# Patient Record
Sex: FEMALE | Race: WHITE | NOT HISPANIC OR LATINO | Employment: OTHER | URBAN - METROPOLITAN AREA
[De-identification: names, ages, dates, MRNs, and addresses within clinical notes are randomized per-mention and may not be internally consistent; named-entity substitution may affect disease eponyms.]

---

## 2019-05-28 ENCOUNTER — HOSPITAL ENCOUNTER (OUTPATIENT)
Dept: ULTRASOUND IMAGING | Facility: HOSPITAL | Age: 79
Discharge: HOME | End: 2019-05-28
Attending: NURSE PRACTITIONER

## 2019-12-15 ENCOUNTER — HOSPITAL ENCOUNTER (OUTPATIENT)
Dept: URGENT CARE | Facility: CLINIC | Age: 79
Discharge: HOME | End: 2019-12-15

## 2019-12-19 ENCOUNTER — HOSPITAL ENCOUNTER (OUTPATIENT)
Dept: GENERAL RADIOLOGY | Facility: HOSPITAL | Age: 79
Discharge: HOME | End: 2019-12-19
Attending: NURSE PRACTITIONER

## 2020-01-02 ENCOUNTER — HOSPITAL ENCOUNTER (OUTPATIENT)
Dept: URGENT CARE | Facility: CLINIC | Age: 80
Discharge: HOME | End: 2020-01-02
Attending: FAMILY MEDICINE

## 2020-02-05 ENCOUNTER — OFFICE VISIT CONVERTED (OUTPATIENT)
Dept: SURGERY | Facility: CLINIC | Age: 80
End: 2020-02-05
Attending: SURGERY

## 2020-02-14 ENCOUNTER — OFFICE VISIT CONVERTED (OUTPATIENT)
Dept: INTERNAL MEDICINE | Facility: CLINIC | Age: 80
End: 2020-02-14
Attending: NURSE PRACTITIONER

## 2020-02-14 ENCOUNTER — HOSPITAL ENCOUNTER (OUTPATIENT)
Dept: OTHER | Facility: HOSPITAL | Age: 80
Discharge: HOME | End: 2020-02-14
Attending: NURSE PRACTITIONER

## 2020-02-14 ENCOUNTER — CONVERSION ENCOUNTER (OUTPATIENT)
Dept: INTERNAL MEDICINE | Facility: CLINIC | Age: 80
End: 2020-02-14

## 2020-02-14 LAB
ALBUMIN SERPL-MCNC: 4 G/DL (ref 3.5–5)
ALBUMIN/GLOB SERPL: 1.3 {RATIO} (ref 1.4–2.6)
ALP SERPL-CCNC: 53 U/L (ref 43–160)
ALT SERPL-CCNC: 10 U/L (ref 10–40)
ANION GAP SERPL CALC-SCNC: 17 MMOL/L (ref 8–19)
AST SERPL-CCNC: 20 U/L (ref 15–50)
BILIRUB SERPL-MCNC: 0.27 MG/DL (ref 0.2–1.3)
BUN SERPL-MCNC: 24 MG/DL (ref 5–25)
BUN/CREAT SERPL: 22 {RATIO} (ref 6–20)
CALCIUM SERPL-MCNC: 9.5 MG/DL (ref 8.7–10.4)
CHLORIDE SERPL-SCNC: 101 MMOL/L (ref 99–111)
CHOLEST SERPL-MCNC: 218 MG/DL (ref 107–200)
CHOLEST/HDLC SERPL: 3.6 {RATIO} (ref 3–6)
CONV CO2: 25 MMOL/L (ref 22–32)
CONV CREATININE URINE, RANDOM: 121.8 MG/DL (ref 10–300)
CONV MICROALBUM.,U,RANDOM: <12 MG/L (ref 0–20)
CONV TOTAL PROTEIN: 7 G/DL (ref 6.3–8.2)
CREAT UR-MCNC: 1.11 MG/DL (ref 0.5–0.9)
GFR SERPLBLD BASED ON 1.73 SQ M-ARVRAT: 48 ML/MIN/{1.73_M2}
GLOBULIN UR ELPH-MCNC: 3 G/DL (ref 2–3.5)
GLUCOSE SERPL-MCNC: 93 MG/DL (ref 65–99)
HDLC SERPL-MCNC: 61 MG/DL (ref 40–60)
LDLC SERPL CALC-MCNC: 101 MG/DL (ref 70–100)
MICROALBUMIN/CREAT UR: 9.9 MG/G{CRE} (ref 0–35)
OSMOLALITY SERPL CALC.SUM OF ELEC: 290 MOSM/KG (ref 273–304)
POTASSIUM SERPL-SCNC: 4.8 MMOL/L (ref 3.5–5.3)
SODIUM SERPL-SCNC: 138 MMOL/L (ref 135–147)
T4 FREE SERPL-MCNC: 1.1 NG/DL (ref 0.9–1.8)
TRIGL SERPL-MCNC: 279 MG/DL (ref 40–150)
TSH SERPL-ACNC: 5.14 M[IU]/L (ref 0.27–4.2)
VLDLC SERPL-MCNC: 56 MG/DL (ref 5–37)

## 2020-05-05 ENCOUNTER — HOSPITAL ENCOUNTER (OUTPATIENT)
Dept: OTHER | Facility: HOSPITAL | Age: 80
Discharge: HOME | End: 2020-05-05
Attending: NURSE PRACTITIONER

## 2020-05-05 LAB
ALBUMIN SERPL-MCNC: 4 G/DL (ref 3.5–5)
ALBUMIN/GLOB SERPL: 1.5 {RATIO} (ref 1.4–2.6)
ALP SERPL-CCNC: 51 U/L (ref 43–160)
ALT SERPL-CCNC: 12 U/L (ref 10–40)
ANION GAP SERPL CALC-SCNC: 20 MMOL/L (ref 8–19)
AST SERPL-CCNC: 22 U/L (ref 15–50)
BASOPHILS # BLD AUTO: 0.04 10*3/UL (ref 0–0.2)
BASOPHILS NFR BLD AUTO: 1 % (ref 0–3)
BILIRUB SERPL-MCNC: 0.33 MG/DL (ref 0.2–1.3)
BUN SERPL-MCNC: 30 MG/DL (ref 5–25)
BUN/CREAT SERPL: 24 {RATIO} (ref 6–20)
CALCIUM SERPL-MCNC: 8.9 MG/DL (ref 8.7–10.4)
CHLORIDE SERPL-SCNC: 101 MMOL/L (ref 99–111)
CHOLEST SERPL-MCNC: 134 MG/DL (ref 107–200)
CHOLEST/HDLC SERPL: 2 {RATIO} (ref 3–6)
CONV ABS IMM GRAN: 0.01 10*3/UL (ref 0–0.2)
CONV CO2: 22 MMOL/L (ref 22–32)
CONV IMMATURE GRAN: 0.3 % (ref 0–1.8)
CONV TOTAL PROTEIN: 6.7 G/DL (ref 6.3–8.2)
CREAT UR-MCNC: 1.24 MG/DL (ref 0.5–0.9)
DEPRECATED RDW RBC AUTO: 47.8 FL (ref 36.4–46.3)
EOSINOPHIL # BLD AUTO: 0.24 10*3/UL (ref 0–0.7)
EOSINOPHIL # BLD AUTO: 6.2 % (ref 0–7)
ERYTHROCYTE [DISTWIDTH] IN BLOOD BY AUTOMATED COUNT: 13.5 % (ref 11.7–14.4)
EST. AVERAGE GLUCOSE BLD GHB EST-MCNC: 157 MG/DL
GFR SERPLBLD BASED ON 1.73 SQ M-ARVRAT: 42 ML/MIN/{1.73_M2}
GLOBULIN UR ELPH-MCNC: 2.7 G/DL (ref 2–3.5)
GLUCOSE SERPL-MCNC: 117 MG/DL (ref 65–99)
HBA1C MFR BLD: 7.1 % (ref 3.5–5.7)
HCT VFR BLD AUTO: 36.2 % (ref 37–47)
HDLC SERPL-MCNC: 68 MG/DL (ref 40–60)
HGB BLD-MCNC: 11.4 G/DL (ref 12–16)
LDLC SERPL CALC-MCNC: 48 MG/DL (ref 70–100)
LYMPHOCYTES # BLD AUTO: 1.23 10*3/UL (ref 1–5)
LYMPHOCYTES NFR BLD AUTO: 31.9 % (ref 20–45)
MCH RBC QN AUTO: 30.1 PG (ref 27–31)
MCHC RBC AUTO-ENTMCNC: 31.5 G/DL (ref 33–37)
MCV RBC AUTO: 95.5 FL (ref 81–99)
MONOCYTES # BLD AUTO: 0.41 10*3/UL (ref 0.2–1.2)
MONOCYTES NFR BLD AUTO: 10.6 % (ref 3–10)
NEUTROPHILS # BLD AUTO: 1.92 10*3/UL (ref 2–8)
NEUTROPHILS NFR BLD AUTO: 50 % (ref 30–85)
NRBC CBCN: 0 % (ref 0–0.7)
OSMOLALITY SERPL CALC.SUM OF ELEC: 295 MOSM/KG (ref 273–304)
PLATELET # BLD AUTO: 195 10*3/UL (ref 130–400)
PMV BLD AUTO: 11.5 FL (ref 9.4–12.3)
POTASSIUM SERPL-SCNC: 4.4 MMOL/L (ref 3.5–5.3)
RBC # BLD AUTO: 3.79 10*6/UL (ref 4.2–5.4)
SODIUM SERPL-SCNC: 139 MMOL/L (ref 135–147)
T4 FREE SERPL-MCNC: 1.1 NG/DL (ref 0.9–1.8)
TRIGL SERPL-MCNC: 92 MG/DL (ref 40–150)
TSH SERPL-ACNC: 6.23 M[IU]/L (ref 0.27–4.2)
VLDLC SERPL-MCNC: 18 MG/DL (ref 5–37)
WBC # BLD AUTO: 3.85 10*3/UL (ref 4.8–10.8)

## 2020-05-06 LAB
FERRITIN SERPL-MCNC: 49 NG/ML (ref 10–200)
IRON SATN MFR SERPL: 20 % (ref 20–55)
IRON SERPL-MCNC: 75 UG/DL (ref 60–170)
TIBC SERPL-MCNC: 373 UG/DL (ref 245–450)
TRANSFERRIN SERPL-MCNC: 261 MG/DL (ref 250–380)

## 2020-05-07 LAB
FOLATE SERPL-MCNC: >20 NG/ML (ref 4.8–20)
VIT B12 SERPL-MCNC: 1551 PG/ML (ref 211–911)

## 2020-05-15 ENCOUNTER — TELEPHONE CONVERTED (OUTPATIENT)
Dept: INTERNAL MEDICINE | Facility: CLINIC | Age: 80
End: 2020-05-15
Attending: NURSE PRACTITIONER

## 2020-06-12 ENCOUNTER — HOSPITAL ENCOUNTER (OUTPATIENT)
Dept: OTHER | Facility: HOSPITAL | Age: 80
Discharge: HOME | End: 2020-06-12
Attending: NURSE PRACTITIONER

## 2020-06-12 LAB
ALBUMIN SERPL-MCNC: 4 G/DL (ref 3.5–5)
ALBUMIN/GLOB SERPL: 1.5 {RATIO} (ref 1.4–2.6)
ALP SERPL-CCNC: 53 U/L (ref 43–160)
ALT SERPL-CCNC: 13 U/L (ref 10–40)
ANION GAP SERPL CALC-SCNC: 18 MMOL/L (ref 8–19)
AST SERPL-CCNC: 20 U/L (ref 15–50)
BASOPHILS # BLD AUTO: 0.04 10*3/UL (ref 0–0.2)
BASOPHILS NFR BLD AUTO: 0.7 % (ref 0–3)
BILIRUB SERPL-MCNC: 0.46 MG/DL (ref 0.2–1.3)
BUN SERPL-MCNC: 27 MG/DL (ref 5–25)
BUN/CREAT SERPL: 22 {RATIO} (ref 6–20)
CALCIUM SERPL-MCNC: 9.4 MG/DL (ref 8.7–10.4)
CHLORIDE SERPL-SCNC: 101 MMOL/L (ref 99–111)
CONV ABS IMM GRAN: 0.02 10*3/UL (ref 0–0.2)
CONV CO2: 25 MMOL/L (ref 22–32)
CONV IMMATURE GRAN: 0.3 % (ref 0–1.8)
CONV TOTAL PROTEIN: 6.7 G/DL (ref 6.3–8.2)
CREAT UR-MCNC: 1.21 MG/DL (ref 0.5–0.9)
DEPRECATED RDW RBC AUTO: 48.2 FL (ref 36.4–46.3)
EOSINOPHIL # BLD AUTO: 0.24 10*3/UL (ref 0–0.7)
EOSINOPHIL # BLD AUTO: 4.1 % (ref 0–7)
ERYTHROCYTE [DISTWIDTH] IN BLOOD BY AUTOMATED COUNT: 13.6 % (ref 11.7–14.4)
GFR SERPLBLD BASED ON 1.73 SQ M-ARVRAT: 43 ML/MIN/{1.73_M2}
GLOBULIN UR ELPH-MCNC: 2.7 G/DL (ref 2–3.5)
GLUCOSE SERPL-MCNC: 111 MG/DL (ref 65–99)
HCT VFR BLD AUTO: 39.2 % (ref 37–47)
HGB BLD-MCNC: 11.8 G/DL (ref 12–16)
LYMPHOCYTES # BLD AUTO: 1.18 10*3/UL (ref 1–5)
LYMPHOCYTES NFR BLD AUTO: 20.2 % (ref 20–45)
MCH RBC QN AUTO: 28.9 PG (ref 27–31)
MCHC RBC AUTO-ENTMCNC: 30.1 G/DL (ref 33–37)
MCV RBC AUTO: 96.1 FL (ref 81–99)
MONOCYTES # BLD AUTO: 0.58 10*3/UL (ref 0.2–1.2)
MONOCYTES NFR BLD AUTO: 9.9 % (ref 3–10)
NEUTROPHILS # BLD AUTO: 3.79 10*3/UL (ref 2–8)
NEUTROPHILS NFR BLD AUTO: 64.8 % (ref 30–85)
NRBC CBCN: 0 % (ref 0–0.7)
OSMOLALITY SERPL CALC.SUM OF ELEC: 296 MOSM/KG (ref 273–304)
PLATELET # BLD AUTO: 242 10*3/UL (ref 130–400)
PMV BLD AUTO: 10.6 FL (ref 9.4–12.3)
POTASSIUM SERPL-SCNC: 4.3 MMOL/L (ref 3.5–5.3)
RBC # BLD AUTO: 4.08 10*6/UL (ref 4.2–5.4)
SODIUM SERPL-SCNC: 140 MMOL/L (ref 135–147)
T4 FREE SERPL-MCNC: 1.2 NG/DL (ref 0.9–1.8)
TSH SERPL-ACNC: 2.79 M[IU]/L (ref 0.27–4.2)
WBC # BLD AUTO: 5.85 10*3/UL (ref 4.8–10.8)

## 2020-06-15 ENCOUNTER — OFFICE VISIT CONVERTED (OUTPATIENT)
Dept: INTERNAL MEDICINE | Facility: CLINIC | Age: 80
End: 2020-06-15
Attending: NURSE PRACTITIONER

## 2020-06-15 ENCOUNTER — HOSPITAL ENCOUNTER (OUTPATIENT)
Dept: URGENT CARE | Facility: CLINIC | Age: 80
Discharge: HOME | End: 2020-06-15
Attending: NURSE PRACTITIONER

## 2020-06-17 LAB — SARS-COV-2 RNA SPEC QL NAA+PROBE: NOT DETECTED

## 2020-08-12 ENCOUNTER — HOSPITAL ENCOUNTER (OUTPATIENT)
Dept: OTHER | Facility: HOSPITAL | Age: 80
Discharge: HOME | End: 2020-08-12
Attending: NURSE PRACTITIONER

## 2020-08-12 LAB
BASOPHILS # BLD AUTO: 0.04 10*3/UL (ref 0–0.2)
BASOPHILS NFR BLD AUTO: 1.1 % (ref 0–3)
CONV ABS IMM GRAN: 0.01 10*3/UL (ref 0–0.2)
CONV IMMATURE GRAN: 0.3 % (ref 0–1.8)
DEPRECATED RDW RBC AUTO: 49.6 FL (ref 36.4–46.3)
EOSINOPHIL # BLD AUTO: 0.32 10*3/UL (ref 0–0.7)
EOSINOPHIL # BLD AUTO: 9 % (ref 0–7)
ERYTHROCYTE [DISTWIDTH] IN BLOOD BY AUTOMATED COUNT: 14 % (ref 11.7–14.4)
EST. AVERAGE GLUCOSE BLD GHB EST-MCNC: 177 MG/DL
HBA1C MFR BLD: 7.8 % (ref 3.5–5.7)
HCT VFR BLD AUTO: 38.9 % (ref 37–47)
HGB BLD-MCNC: 12 G/DL (ref 12–16)
LYMPHOCYTES # BLD AUTO: 1.05 10*3/UL (ref 1–5)
LYMPHOCYTES NFR BLD AUTO: 29.7 % (ref 20–45)
MCH RBC QN AUTO: 29.7 PG (ref 27–31)
MCHC RBC AUTO-ENTMCNC: 30.8 G/DL (ref 33–37)
MCV RBC AUTO: 96.3 FL (ref 81–99)
MONOCYTES # BLD AUTO: 0.34 10*3/UL (ref 0.2–1.2)
MONOCYTES NFR BLD AUTO: 9.6 % (ref 3–10)
NEUTROPHILS # BLD AUTO: 1.78 10*3/UL (ref 2–8)
NEUTROPHILS NFR BLD AUTO: 50.3 % (ref 30–85)
NRBC CBCN: 0 % (ref 0–0.7)
PLATELET # BLD AUTO: 249 10*3/UL (ref 130–400)
PMV BLD AUTO: 11.2 FL (ref 9.4–12.3)
RBC # BLD AUTO: 4.04 10*6/UL (ref 4.2–5.4)
WBC # BLD AUTO: 3.54 10*3/UL (ref 4.8–10.8)

## 2020-08-13 LAB
ALBUMIN SERPL-MCNC: 3.9 G/DL (ref 3.5–5)
ALBUMIN/GLOB SERPL: 1.4 {RATIO} (ref 1.4–2.6)
ALP SERPL-CCNC: 59 U/L (ref 43–160)
ALT SERPL-CCNC: 16 U/L (ref 10–40)
ANION GAP SERPL CALC-SCNC: 17 MMOL/L (ref 8–19)
AST SERPL-CCNC: 24 U/L (ref 15–50)
BILIRUB SERPL-MCNC: 0.28 MG/DL (ref 0.2–1.3)
BUN SERPL-MCNC: 29 MG/DL (ref 5–25)
BUN/CREAT SERPL: 21 {RATIO} (ref 6–20)
CALCIUM SERPL-MCNC: 10.1 MG/DL (ref 8.7–10.4)
CHLORIDE SERPL-SCNC: 101 MMOL/L (ref 99–111)
CONV CO2: 25 MMOL/L (ref 22–32)
CONV TOTAL PROTEIN: 6.6 G/DL (ref 6.3–8.2)
CREAT UR-MCNC: 1.41 MG/DL (ref 0.5–0.9)
GFR SERPLBLD BASED ON 1.73 SQ M-ARVRAT: 36 ML/MIN/{1.73_M2}
GLOBULIN UR ELPH-MCNC: 2.7 G/DL (ref 2–3.5)
GLUCOSE SERPL-MCNC: 193 MG/DL (ref 65–99)
OSMOLALITY SERPL CALC.SUM OF ELEC: 297 MOSM/KG (ref 273–304)
POTASSIUM SERPL-SCNC: 5.3 MMOL/L (ref 3.5–5.3)
SODIUM SERPL-SCNC: 138 MMOL/L (ref 135–147)

## 2020-08-18 ENCOUNTER — OFFICE VISIT CONVERTED (OUTPATIENT)
Dept: INTERNAL MEDICINE | Facility: CLINIC | Age: 80
End: 2020-08-18
Attending: NURSE PRACTITIONER

## 2020-09-14 ENCOUNTER — HOSPITAL ENCOUNTER (OUTPATIENT)
Dept: OTHER | Facility: HOSPITAL | Age: 80
Discharge: HOME | End: 2020-09-14
Attending: PHYSICIAN ASSISTANT

## 2020-09-14 LAB
ALBUMIN SERPL-MCNC: 3.9 G/DL (ref 3.5–5)
ALBUMIN/GLOB SERPL: 1.4 {RATIO} (ref 1.4–2.6)
ALP SERPL-CCNC: 64 U/L (ref 43–160)
ALT SERPL-CCNC: 16 U/L (ref 10–40)
ANION GAP SERPL CALC-SCNC: 18 MMOL/L (ref 8–19)
AST SERPL-CCNC: 22 U/L (ref 15–50)
BASOPHILS # BLD AUTO: 0.04 10*3/UL (ref 0–0.2)
BASOPHILS NFR BLD AUTO: 0.9 % (ref 0–3)
BILIRUB SERPL-MCNC: 0.33 MG/DL (ref 0.2–1.3)
BUN SERPL-MCNC: 26 MG/DL (ref 5–25)
BUN/CREAT SERPL: 20 {RATIO} (ref 6–20)
CALCIUM SERPL-MCNC: 9.4 MG/DL (ref 8.7–10.4)
CHLORIDE SERPL-SCNC: 102 MMOL/L (ref 99–111)
CONV ABS IMM GRAN: 0.02 10*3/UL (ref 0–0.2)
CONV CO2: 22 MMOL/L (ref 22–32)
CONV IMMATURE GRAN: 0.4 % (ref 0–1.8)
CONV TOTAL PROTEIN: 6.6 G/DL (ref 6.3–8.2)
CREAT UR-MCNC: 1.32 MG/DL (ref 0.5–0.9)
DEPRECATED RDW RBC AUTO: 46.1 FL (ref 36.4–46.3)
EOSINOPHIL # BLD AUTO: 0.25 10*3/UL (ref 0–0.7)
EOSINOPHIL # BLD AUTO: 5.6 % (ref 0–7)
ERYTHROCYTE [DISTWIDTH] IN BLOOD BY AUTOMATED COUNT: 13.1 % (ref 11.7–14.4)
GFR SERPLBLD BASED ON 1.73 SQ M-ARVRAT: 39 ML/MIN/{1.73_M2}
GLOBULIN UR ELPH-MCNC: 2.7 G/DL (ref 2–3.5)
GLUCOSE SERPL-MCNC: 198 MG/DL (ref 65–99)
HCT VFR BLD AUTO: 41 % (ref 37–47)
HGB BLD-MCNC: 12.6 G/DL (ref 12–16)
LYMPHOCYTES # BLD AUTO: 1.26 10*3/UL (ref 1–5)
LYMPHOCYTES NFR BLD AUTO: 28.1 % (ref 20–45)
MCH RBC QN AUTO: 29.9 PG (ref 27–31)
MCHC RBC AUTO-ENTMCNC: 30.7 G/DL (ref 33–37)
MCV RBC AUTO: 97.2 FL (ref 81–99)
MONOCYTES # BLD AUTO: 0.47 10*3/UL (ref 0.2–1.2)
MONOCYTES NFR BLD AUTO: 10.5 % (ref 3–10)
NEUTROPHILS # BLD AUTO: 2.45 10*3/UL (ref 2–8)
NEUTROPHILS NFR BLD AUTO: 54.5 % (ref 30–85)
NRBC CBCN: 0 % (ref 0–0.7)
OSMOLALITY SERPL CALC.SUM OF ELEC: 294 MOSM/KG (ref 273–304)
PLATELET # BLD AUTO: 289 10*3/UL (ref 130–400)
PMV BLD AUTO: 10.9 FL (ref 9.4–12.3)
POTASSIUM SERPL-SCNC: 4.9 MMOL/L (ref 3.5–5.3)
RBC # BLD AUTO: 4.22 10*6/UL (ref 4.2–5.4)
SODIUM SERPL-SCNC: 137 MMOL/L (ref 135–147)
T4 FREE SERPL-MCNC: 1 NG/DL (ref 0.9–1.8)
TSH SERPL-ACNC: 1.83 M[IU]/L (ref 0.27–4.2)
WBC # BLD AUTO: 4.49 10*3/UL (ref 4.8–10.8)

## 2020-11-13 ENCOUNTER — HOSPITAL ENCOUNTER (OUTPATIENT)
Dept: OTHER | Facility: HOSPITAL | Age: 80
Discharge: HOME | End: 2020-11-13
Attending: NURSE PRACTITIONER

## 2020-11-13 LAB
BASOPHILS # BLD AUTO: 0.05 10*3/UL (ref 0–0.2)
BASOPHILS NFR BLD AUTO: 1.2 % (ref 0–3)
CONV ABS IMM GRAN: 0 10*3/UL (ref 0–0.2)
CONV IMMATURE GRAN: 0 % (ref 0–1.8)
DEPRECATED RDW RBC AUTO: 40.4 FL (ref 36.4–46.3)
EOSINOPHIL # BLD AUTO: 0.28 10*3/UL (ref 0–0.7)
EOSINOPHIL # BLD AUTO: 6.8 % (ref 0–7)
ERYTHROCYTE [DISTWIDTH] IN BLOOD BY AUTOMATED COUNT: 12.1 % (ref 11.7–14.4)
EST. AVERAGE GLUCOSE BLD GHB EST-MCNC: 220 MG/DL
HBA1C MFR BLD: 9.3 % (ref 3.5–5.7)
HCT VFR BLD AUTO: 43.8 % (ref 37–47)
HGB BLD-MCNC: 14.1 G/DL (ref 12–16)
LYMPHOCYTES # BLD AUTO: 1.26 10*3/UL (ref 1–5)
LYMPHOCYTES NFR BLD AUTO: 30.4 % (ref 20–45)
MCH RBC QN AUTO: 29.6 PG (ref 27–31)
MCHC RBC AUTO-ENTMCNC: 32.2 G/DL (ref 33–37)
MCV RBC AUTO: 91.8 FL (ref 81–99)
MONOCYTES # BLD AUTO: 0.49 10*3/UL (ref 0.2–1.2)
MONOCYTES NFR BLD AUTO: 11.8 % (ref 3–10)
NEUTROPHILS # BLD AUTO: 2.06 10*3/UL (ref 2–8)
NEUTROPHILS NFR BLD AUTO: 49.8 % (ref 30–85)
NRBC CBCN: 0 % (ref 0–0.7)
PLATELET # BLD AUTO: 282 10*3/UL (ref 130–400)
PMV BLD AUTO: 10.5 FL (ref 9.4–12.3)
RBC # BLD AUTO: 4.77 10*6/UL (ref 4.2–5.4)
WBC # BLD AUTO: 4.14 10*3/UL (ref 4.8–10.8)

## 2020-11-14 LAB
ALBUMIN SERPL-MCNC: 4.2 G/DL (ref 3.5–5)
ALBUMIN/GLOB SERPL: 1.5 {RATIO} (ref 1.4–2.6)
ALP SERPL-CCNC: 73 U/L (ref 43–160)
ALT SERPL-CCNC: 14 U/L (ref 10–40)
ANION GAP SERPL CALC-SCNC: 17 MMOL/L (ref 8–19)
AST SERPL-CCNC: 22 U/L (ref 15–50)
BILIRUB SERPL-MCNC: 0.38 MG/DL (ref 0.2–1.3)
BUN SERPL-MCNC: 20 MG/DL (ref 5–25)
BUN/CREAT SERPL: 15 {RATIO} (ref 6–20)
CALCIUM SERPL-MCNC: 9.3 MG/DL (ref 8.7–10.4)
CHLORIDE SERPL-SCNC: 102 MMOL/L (ref 99–111)
CHOLEST SERPL-MCNC: 162 MG/DL (ref 107–200)
CHOLEST/HDLC SERPL: 2.4 {RATIO} (ref 3–6)
CONV CO2: 24 MMOL/L (ref 22–32)
CONV TOTAL PROTEIN: 7 G/DL (ref 6.3–8.2)
CREAT UR-MCNC: 1.36 MG/DL (ref 0.5–0.9)
GFR SERPLBLD BASED ON 1.73 SQ M-ARVRAT: 37 ML/MIN/{1.73_M2}
GLOBULIN UR ELPH-MCNC: 2.8 G/DL (ref 2–3.5)
GLUCOSE SERPL-MCNC: 181 MG/DL (ref 65–99)
HDLC SERPL-MCNC: 68 MG/DL (ref 40–60)
LDLC SERPL CALC-MCNC: 59 MG/DL (ref 70–100)
OSMOLALITY SERPL CALC.SUM OF ELEC: 293 MOSM/KG (ref 273–304)
POTASSIUM SERPL-SCNC: 5 MMOL/L (ref 3.5–5.3)
SODIUM SERPL-SCNC: 138 MMOL/L (ref 135–147)
T4 FREE SERPL-MCNC: 1.3 NG/DL (ref 0.9–1.8)
TRIGL SERPL-MCNC: 174 MG/DL (ref 40–150)
TSH SERPL-ACNC: 2.86 M[IU]/L (ref 0.27–4.2)
VLDLC SERPL-MCNC: 35 MG/DL (ref 5–37)

## 2020-11-18 ENCOUNTER — HOSPITAL ENCOUNTER (OUTPATIENT)
Dept: OTHER | Facility: HOSPITAL | Age: 80
Discharge: HOME | End: 2020-11-18
Attending: NURSE PRACTITIONER

## 2020-11-18 ENCOUNTER — OFFICE VISIT CONVERTED (OUTPATIENT)
Dept: INTERNAL MEDICINE | Facility: CLINIC | Age: 80
End: 2020-11-18
Attending: NURSE PRACTITIONER

## 2020-11-18 LAB
BASOPHILS # BLD AUTO: 0.05 10*3/UL (ref 0–0.2)
BASOPHILS # BLD: 2 % (ref 0–3)
BASOPHILS NFR BLD AUTO: 1 % (ref 0–3)
CONV ABS BANDS: 0 % (ref 1–5)
CONV ABS IMM GRAN: 0.01 10*3/UL (ref 0–0.2)
CONV ANISOCYTES: SLIGHT
CONV ATYPICAL LYMPHOCYTES: 4 % (ref 0–5)
CONV HYPOCHROMIA IN BLOOD BY LIGHT MICROSCOPY: SLIGHT
CONV IMMATURE GRAN: 0.2 % (ref 0–1.8)
CONV SEGMENTED NEUTROPHILS: 67 % (ref 45–70)
DACRYOCYTES BLD QL SMEAR: ABNORMAL
DEPRECATED RDW RBC AUTO: 42.2 FL (ref 36.4–46.3)
EOSINOPHIL # BLD AUTO: 0.22 10*3/UL (ref 0–0.7)
EOSINOPHIL # BLD AUTO: 4.2 % (ref 0–7)
EOSINOPHIL NFR BLD AUTO: 8 % (ref 0–7)
ERYTHROCYTE [DISTWIDTH] IN BLOOD BY AUTOMATED COUNT: 12.4 % (ref 11.7–14.4)
HCT VFR BLD AUTO: 41.5 % (ref 37–47)
HGB BLD-MCNC: 13 G/DL (ref 12–16)
LYMPHOCYTES # BLD AUTO: 1.32 10*3/UL (ref 1–5)
LYMPHOCYTES NFR BLD AUTO: 25.1 % (ref 20–45)
MCH RBC QN AUTO: 29 PG (ref 27–31)
MCHC RBC AUTO-ENTMCNC: 31.3 G/DL (ref 33–37)
MCV RBC AUTO: 92.4 FL (ref 81–99)
MICROCYTES BLD QL: ABNORMAL
MONOCYTES # BLD AUTO: 0.38 10*3/UL (ref 0.2–1.2)
MONOCYTES NFR BLD AUTO: 7.2 % (ref 3–10)
MONOCYTES NFR BLD MANUAL: 7 % (ref 3–10)
NEUTROPHILS # BLD AUTO: 3.27 10*3/UL (ref 2–8)
NEUTROPHILS NFR BLD AUTO: 62.3 % (ref 30–85)
NRBC CBCN: 0 % (ref 0–0.7)
NUC CELL # PRT MANUAL: 0 /100{WBCS}
PLAT MORPH BLD: NORMAL
PLATELET # BLD AUTO: 270 10*3/UL (ref 130–400)
PMV BLD AUTO: 11.2 FL (ref 9.4–12.3)
POLYCHROMASIA BLD QL SMEAR: ABNORMAL
RBC # BLD AUTO: 4.49 10*6/UL (ref 4.2–5.4)
SMALL PLATELETS BLD QL SMEAR: ADEQUATE
VARIANT LYMPHS NFR BLD MANUAL: 12 % (ref 20–45)
WBC # BLD AUTO: 5.25 10*3/UL (ref 4.8–10.8)

## 2020-12-04 ENCOUNTER — HOSPITAL ENCOUNTER (OUTPATIENT)
Dept: OTHER | Facility: HOSPITAL | Age: 80
Discharge: HOME | End: 2020-12-04
Attending: INTERNAL MEDICINE

## 2020-12-04 LAB
ALBUMIN SERPL-MCNC: 4 G/DL (ref 3.5–5)
ANION GAP SERPL CALC-SCNC: 12 MMOL/L (ref 8–19)
APPEARANCE UR: CLEAR
BASOPHILS # BLD AUTO: 0.04 10*3/UL (ref 0–0.2)
BASOPHILS NFR BLD AUTO: 0.9 % (ref 0–3)
BILIRUB UR QL: NEGATIVE
BUN SERPL-MCNC: 27 MG/DL (ref 5–25)
BUN/CREAT SERPL: 20 {RATIO} (ref 6–20)
CALCIUM SERPL-MCNC: 9.3 MG/DL (ref 8.7–10.4)
CHLORIDE SERPL-SCNC: 102 MMOL/L (ref 99–111)
COLOR UR: YELLOW
CONV ABS IMM GRAN: 0.01 10*3/UL (ref 0–0.2)
CONV CO2: 28 MMOL/L (ref 22–32)
CONV COLLECTION SOURCE (UA): ABNORMAL
CONV CREATININE URINE, RANDOM: 43.8 MG/DL (ref 10–300)
CONV IMMATURE GRAN: 0.2 % (ref 0–1.8)
CONV PROTEIN TO CREATININE RATIO (RANDOM URINE): 0.09 {RATIO} (ref 0–0.1)
CONV UROBILINOGEN IN URINE BY AUTOMATED TEST STRIP: 0.2 {EHRLICHU}/DL (ref 0.1–1)
CREAT UR-MCNC: 1.34 MG/DL (ref 0.5–0.9)
DEPRECATED RDW RBC AUTO: 41.3 FL (ref 36.4–46.3)
EOSINOPHIL # BLD AUTO: 0.27 10*3/UL (ref 0–0.7)
EOSINOPHIL # BLD AUTO: 5.8 % (ref 0–7)
ERYTHROCYTE [DISTWIDTH] IN BLOOD BY AUTOMATED COUNT: 12.2 % (ref 11.7–14.4)
GFR SERPLBLD BASED ON 1.73 SQ M-ARVRAT: 38 ML/MIN/{1.73_M2}
GLUCOSE SERPL-MCNC: 206 MG/DL (ref 65–99)
GLUCOSE UR QL: >=1000 MG/DL
HCT VFR BLD AUTO: 40.8 % (ref 37–47)
HGB BLD-MCNC: 13 G/DL (ref 12–16)
HGB UR QL STRIP: NEGATIVE
KETONES UR QL STRIP: NEGATIVE MG/DL
LEUKOCYTE ESTERASE UR QL STRIP: NEGATIVE
LYMPHOCYTES # BLD AUTO: 1.31 10*3/UL (ref 1–5)
LYMPHOCYTES NFR BLD AUTO: 27.9 % (ref 20–45)
MCH RBC QN AUTO: 29.3 PG (ref 27–31)
MCHC RBC AUTO-ENTMCNC: 31.9 G/DL (ref 33–37)
MCV RBC AUTO: 91.9 FL (ref 81–99)
MONOCYTES # BLD AUTO: 0.44 10*3/UL (ref 0.2–1.2)
MONOCYTES NFR BLD AUTO: 9.4 % (ref 3–10)
NEUTROPHILS # BLD AUTO: 2.62 10*3/UL (ref 2–8)
NEUTROPHILS NFR BLD AUTO: 55.8 % (ref 30–85)
NITRITE UR QL STRIP: NEGATIVE
NRBC CBCN: 0 % (ref 0–0.7)
PH UR STRIP.AUTO: 5 [PH] (ref 5–8)
PHOSPHATE SERPL-MCNC: 3.2 MG/DL (ref 2.4–4.5)
PLATELET # BLD AUTO: 271 10*3/UL (ref 130–400)
PMV BLD AUTO: 10.1 FL (ref 9.4–12.3)
POTASSIUM SERPL-SCNC: 5.2 MMOL/L (ref 3.5–5.3)
PROT UR QL: NEGATIVE MG/DL
PROT UR-MCNC: <4 MG/DL
RBC # BLD AUTO: 4.44 10*6/UL (ref 4.2–5.4)
SODIUM SERPL-SCNC: 137 MMOL/L (ref 135–147)
SP GR UR: 1.02 (ref 1–1.03)
WBC # BLD AUTO: 4.69 10*3/UL (ref 4.8–10.8)

## 2021-02-19 ENCOUNTER — OFFICE VISIT CONVERTED (OUTPATIENT)
Dept: INTERNAL MEDICINE | Facility: CLINIC | Age: 81
End: 2021-02-19
Attending: NURSE PRACTITIONER

## 2021-02-19 ENCOUNTER — HOSPITAL ENCOUNTER (OUTPATIENT)
Dept: OTHER | Facility: HOSPITAL | Age: 81
Discharge: HOME | End: 2021-02-19
Attending: NURSE PRACTITIONER

## 2021-02-19 LAB
ALBUMIN SERPL-MCNC: 4.1 G/DL (ref 3.5–5)
ALBUMIN/GLOB SERPL: 1.5 {RATIO} (ref 1.4–2.6)
ALP SERPL-CCNC: 66 U/L (ref 43–160)
ALT SERPL-CCNC: 13 U/L (ref 10–40)
ANION GAP SERPL CALC-SCNC: 17 MMOL/L (ref 8–19)
AST SERPL-CCNC: 20 U/L (ref 15–50)
BASOPHILS # BLD AUTO: 0.05 10*3/UL (ref 0–0.2)
BASOPHILS NFR BLD AUTO: 0.8 % (ref 0–3)
BILIRUB SERPL-MCNC: 0.32 MG/DL (ref 0.2–1.3)
BUN SERPL-MCNC: 27 MG/DL (ref 5–25)
BUN/CREAT SERPL: 19 {RATIO} (ref 6–20)
CALCIUM SERPL-MCNC: 9.4 MG/DL (ref 8.7–10.4)
CHLORIDE SERPL-SCNC: 105 MMOL/L (ref 99–111)
CHOLEST SERPL-MCNC: 133 MG/DL (ref 107–200)
CHOLEST/HDLC SERPL: 1.8 {RATIO} (ref 3–6)
CONV ABS IMM GRAN: 0.01 10*3/UL (ref 0–0.2)
CONV CO2: 22 MMOL/L (ref 22–32)
CONV CREATININE URINE, RANDOM: 83.8 MG/DL (ref 10–300)
CONV IMMATURE GRAN: 0.2 % (ref 0–1.8)
CONV MICROALBUM.,U,RANDOM: <12 MG/L (ref 0–20)
CONV TOTAL PROTEIN: 6.9 G/DL (ref 6.3–8.2)
CREAT UR-MCNC: 1.45 MG/DL (ref 0.5–0.9)
DEPRECATED RDW RBC AUTO: 45.1 FL (ref 36.4–46.3)
EOSINOPHIL # BLD AUTO: 0.22 10*3/UL (ref 0–0.7)
EOSINOPHIL # BLD AUTO: 3.5 % (ref 0–7)
ERYTHROCYTE [DISTWIDTH] IN BLOOD BY AUTOMATED COUNT: 13.5 % (ref 11.7–14.4)
EST. AVERAGE GLUCOSE BLD GHB EST-MCNC: 189 MG/DL
GFR SERPLBLD BASED ON 1.73 SQ M-ARVRAT: 34 ML/MIN/{1.73_M2}
GLOBULIN UR ELPH-MCNC: 2.8 G/DL (ref 2–3.5)
GLUCOSE SERPL-MCNC: 155 MG/DL (ref 65–99)
HBA1C MFR BLD: 8.2 % (ref 3.5–5.7)
HCT VFR BLD AUTO: 43.1 % (ref 37–47)
HDLC SERPL-MCNC: 73 MG/DL (ref 40–60)
HGB BLD-MCNC: 13.8 G/DL (ref 12–16)
LDLC SERPL CALC-MCNC: 33 MG/DL (ref 70–100)
LYMPHOCYTES # BLD AUTO: 1.28 10*3/UL (ref 1–5)
LYMPHOCYTES NFR BLD AUTO: 20.6 % (ref 20–45)
MCH RBC QN AUTO: 29 PG (ref 27–31)
MCHC RBC AUTO-ENTMCNC: 32 G/DL (ref 33–37)
MCV RBC AUTO: 90.5 FL (ref 81–99)
MICROALBUMIN/CREAT UR: 14.3 MG/G{CRE} (ref 0–35)
MONOCYTES # BLD AUTO: 0.57 10*3/UL (ref 0.2–1.2)
MONOCYTES NFR BLD AUTO: 9.2 % (ref 3–10)
NEUTROPHILS # BLD AUTO: 4.08 10*3/UL (ref 2–8)
NEUTROPHILS NFR BLD AUTO: 65.7 % (ref 30–85)
NRBC CBCN: 0 % (ref 0–0.7)
OSMOLALITY SERPL CALC.SUM OF ELEC: 296 MOSM/KG (ref 273–304)
PLATELET # BLD AUTO: 272 10*3/UL (ref 130–400)
PMV BLD AUTO: 11 FL (ref 9.4–12.3)
POTASSIUM SERPL-SCNC: 5.3 MMOL/L (ref 3.5–5.3)
RBC # BLD AUTO: 4.76 10*6/UL (ref 4.2–5.4)
SODIUM SERPL-SCNC: 139 MMOL/L (ref 135–147)
T4 FREE SERPL-MCNC: 1.3 NG/DL (ref 0.9–1.8)
TRIGL SERPL-MCNC: 133 MG/DL (ref 40–150)
TSH SERPL-ACNC: 1.93 M[IU]/L (ref 0.27–4.2)
VLDLC SERPL-MCNC: 27 MG/DL (ref 5–37)
WBC # BLD AUTO: 6.21 10*3/UL (ref 4.8–10.8)

## 2021-02-24 ENCOUNTER — HOSPITAL ENCOUNTER (OUTPATIENT)
Dept: VACCINE CLINIC | Facility: HOSPITAL | Age: 81
Discharge: HOME | End: 2021-02-24
Attending: INTERNAL MEDICINE

## 2021-03-04 ENCOUNTER — HOSPITAL ENCOUNTER (OUTPATIENT)
Dept: OTHER | Facility: HOSPITAL | Age: 81
Discharge: HOME | End: 2021-03-04
Attending: INTERNAL MEDICINE

## 2021-03-04 LAB
25(OH)D3 SERPL-MCNC: 33.6 NG/ML (ref 30–100)
ALBUMIN SERPL-MCNC: 3.8 G/DL (ref 3.5–5)
ANION GAP SERPL CALC-SCNC: 18 MMOL/L (ref 8–19)
APPEARANCE UR: CLEAR
BASOPHILS # BLD AUTO: 0.04 10*3/UL (ref 0–0.2)
BASOPHILS NFR BLD AUTO: 0.8 % (ref 0–3)
BILIRUB UR QL: NEGATIVE
BUN SERPL-MCNC: 26 MG/DL (ref 5–25)
BUN/CREAT SERPL: 21 {RATIO} (ref 6–20)
CALCIUM SERPL-MCNC: 9.6 MG/DL (ref 8.7–10.4)
CHLORIDE SERPL-SCNC: 102 MMOL/L (ref 99–111)
COLOR UR: YELLOW
CONV ABS IMM GRAN: 0.01 10*3/UL (ref 0–0.2)
CONV BACTERIA: NEGATIVE
CONV CO2: 25 MMOL/L (ref 22–32)
CONV COLLECTION SOURCE (UA): ABNORMAL
CONV CREATININE URINE, RANDOM: 82.6 MG/DL (ref 10–300)
CONV HYALINE CASTS IN URINE MICRO: ABNORMAL /[LPF]
CONV IMMATURE GRAN: 0.2 % (ref 0–1.8)
CONV PROTEIN TO CREATININE RATIO (RANDOM URINE): 0.09 {RATIO} (ref 0–0.1)
CONV UROBILINOGEN IN URINE BY AUTOMATED TEST STRIP: 0.2 {EHRLICHU}/DL (ref 0.1–1)
CREAT UR-MCNC: 1.26 MG/DL (ref 0.5–0.9)
DEPRECATED RDW RBC AUTO: 44.5 FL (ref 36.4–46.3)
EOSINOPHIL # BLD AUTO: 0.27 10*3/UL (ref 0–0.7)
EOSINOPHIL # BLD AUTO: 5.5 % (ref 0–7)
ERYTHROCYTE [DISTWIDTH] IN BLOOD BY AUTOMATED COUNT: 13.5 % (ref 11.7–14.4)
GFR SERPLBLD BASED ON 1.73 SQ M-ARVRAT: 41 ML/MIN/{1.73_M2}
GLUCOSE SERPL-MCNC: 229 MG/DL (ref 65–99)
GLUCOSE UR QL: >=1000 MG/DL
HCT VFR BLD AUTO: 42.5 % (ref 37–47)
HGB BLD-MCNC: 13.5 G/DL (ref 12–16)
HGB UR QL STRIP: NEGATIVE
KETONES UR QL STRIP: NEGATIVE MG/DL
LEUKOCYTE ESTERASE UR QL STRIP: ABNORMAL
LYMPHOCYTES # BLD AUTO: 1.18 10*3/UL (ref 1–5)
LYMPHOCYTES NFR BLD AUTO: 23.9 % (ref 20–45)
MCH RBC QN AUTO: 28.8 PG (ref 27–31)
MCHC RBC AUTO-ENTMCNC: 31.8 G/DL (ref 33–37)
MCV RBC AUTO: 90.8 FL (ref 81–99)
MONOCYTES # BLD AUTO: 0.42 10*3/UL (ref 0.2–1.2)
MONOCYTES NFR BLD AUTO: 8.5 % (ref 3–10)
NEUTROPHILS # BLD AUTO: 3.02 10*3/UL (ref 2–8)
NEUTROPHILS NFR BLD AUTO: 61.1 % (ref 30–85)
NITRITE UR QL STRIP: NEGATIVE
NRBC CBCN: 0 % (ref 0–0.7)
PH UR STRIP.AUTO: 5.5 [PH] (ref 5–8)
PHOSPHATE SERPL-MCNC: 3.6 MG/DL (ref 2.4–4.5)
PLATELET # BLD AUTO: 271 10*3/UL (ref 130–400)
PMV BLD AUTO: 10.1 FL (ref 9.4–12.3)
POTASSIUM SERPL-SCNC: 4.5 MMOL/L (ref 3.5–5.3)
PROT UR QL: NEGATIVE MG/DL
PROT UR-MCNC: 7.2 MG/DL
PTH-INTACT SERPL-MCNC: 53.2 PG/ML (ref 11.1–79.5)
RBC # BLD AUTO: 4.68 10*6/UL (ref 4.2–5.4)
RBC #/AREA URNS HPF: ABNORMAL /[HPF]
SODIUM SERPL-SCNC: 140 MMOL/L (ref 135–147)
SP GR UR: 1.03 (ref 1–1.03)
WBC # BLD AUTO: 4.94 10*3/UL (ref 4.8–10.8)
WBC #/AREA URNS HPF: ABNORMAL /[HPF]

## 2021-03-19 ENCOUNTER — TELEPHONE CONVERTED (OUTPATIENT)
Dept: INTERNAL MEDICINE | Facility: CLINIC | Age: 81
End: 2021-03-19
Attending: NURSE PRACTITIONER

## 2021-03-25 ENCOUNTER — HOSPITAL ENCOUNTER (OUTPATIENT)
Dept: VACCINE CLINIC | Facility: HOSPITAL | Age: 81
Discharge: HOME | End: 2021-03-25
Attending: INTERNAL MEDICINE

## 2021-04-06 ENCOUNTER — OFFICE VISIT CONVERTED (OUTPATIENT)
Dept: INTERNAL MEDICINE | Facility: CLINIC | Age: 81
End: 2021-04-06
Attending: NURSE PRACTITIONER

## 2021-04-27 ENCOUNTER — TELEPHONE CONVERTED (OUTPATIENT)
Dept: INTERNAL MEDICINE | Facility: CLINIC | Age: 81
End: 2021-04-27
Attending: NURSE PRACTITIONER

## 2021-04-28 ENCOUNTER — HOSPITAL ENCOUNTER (OUTPATIENT)
Dept: GENERAL RADIOLOGY | Facility: HOSPITAL | Age: 81
Discharge: HOME | End: 2021-04-28
Attending: NURSE PRACTITIONER

## 2021-05-13 NOTE — PROGRESS NOTES
"   Progress Note      Patient Name: Griselda Henderson   Patient ID: [id number]   Sex: Female   YOB: 1940    Primary Care Provider: Dory STEPHENS   Referring Provider: Dory STEPHENS    Visit Date: Marisol 15, 2020    Provider: KAT Hood   Location: [facility]   Location Address: [address] [place] [address]  [id number]   Location Phone: [phone]          Chief Complaint  · \"I had a fever last night, sore throat, muscle and body aches\"      History Of Present Illness  Griselda Henderson is a 79 year old /White female who presents for evaluation and treatment of:      fever, sore throat, body aches, chills since last night  she reports having a soreness in her neck and shoulders over the last few days  Denies n/v, diarrhea, cough, and soa.   has been doing grocery shopping herself at [facility], has been wearing a mask while there  she reports having a severe headache/neck ache last night. improved with motrin and cold pack  neck soreness still present today. neck pain is not more lateral than posterior  mild frontal headache now present  denies blurred vision, dizziness, other cognitive symptoms       Past Medical History  Disease Name Date Onset Notes   Allergic rhinitis, chronic --  --    Basal Cell Carcinoma 2008 2008, 10/01/2012, 10/30/2012   Cataract --  --    COPD --  --    Deafness --  HEARING AID   Diabetes --  --    GERD --  --    Heart Murmur --  --    Hemorrhoid --  --    Hernia --  --    Night sweats --  --          Past Surgical History  Procedure Name Date Notes   Appendectomy --  --    Bowel Surgery 01/27/2020 CLOSED LOOP BOWEL OBSTRUCTION REMOVED   Breast 1970 --    Cataract surgery 01/23/2012 01/23/2012: LEFT EYE 03/05/2012: RIGHT EYE   Fasciotomy 09/2004 RIGHT HEEL   Heel Spur 01/2004 --    Hernia 2012 --    Hysterectomy-Abdominal 1981 --    Plantar Fasciitis 11/09/2011 LEFT FOOT   Tonsilectomy 1959 --    Venous ligation 2012 " 02/22/2012 RIGHT LEG VENOUS CLOSURE 02/29/2012 LEFT LEG VENOUS CLOSURE 03/07/2012 BILATERAL LEG BACK CLOSURE         Medication List  Name Date Started Instructions   Aspir-81 81 mg oral tablet,delayed release (DR/EC)  take 1 tablet (81 mg) by oral route once daily   atorvastatin 10 mg oral tablet 05/15/2020 take 1 tablet (10 mg) by oral route once daily at bedtime   cetirizine 10 mg oral tablet 05/15/2020 take 1 tablet by oral route daily   esomeprazole magnesium 40 mg oral capsule,delayed release(DR/EC) 05/15/2020 take 1 capsule by oral route 2 times a day   estradiol 0.5 mg oral tablet 05/15/2020 take 0.5 tablet by oral route daily   lisinopril 5 mg oral tablet 05/15/2020 take 1 tablet (5 mg) by oral route once daily   magnesium 200 mg oral tablet  take 2 tablets by oral route daily   melatonin 10 mg oral capsule  --    metformin 1,000 mg oral tablet 05/15/2020 take 1 tablet (1,000 mg) by oral route 2 times per day with morning and evening meals   ONE-DAILY MULTI-VITAMIN TAB  --    pioglitazone 45 mg oral tablet 05/05/2020 take 1 tablet (45 mg) by oral route once daily   potassium 99 mg oral tablet  --    Shingrix (PF) 50 mcg/0.5 mL intramuscular suspension for reconstitution 02/14/2020 inject 0.5 milliliter (50 mcg) by intramuscular route once   Synthroid 25 mcg oral tablet 05/15/2020 take 1 tablet (25 mcg) by oral route once daily on an empty stomach 60 minutes before breakfast   venlafaxine 75 mg oral tablet 02/14/2020 take 1 tablet by oral route daily   Vitamin D-3 with Aloe 120-1,000-10 mg-unit-mg oral tablet  take 1 tablet by oral route daily   Voltaren 1 % topical gel 05/15/2020 apply 4 grams to the affected area(s) by topical route 4 times per day as needed         Allergy List  Allergen Name Date Reaction Notes   Latex Exam Gloves --  --  --    PENICILLINS --  --  --          Family Medical History  Disease Name Relative/Age Notes   Stroke Father/   --    Heart Disease Mother/   --    Diabetes,  "unspecified type Brother/  Father/   Father; Brother   Mitral valve prolapse Mother/   --          Social History  Finding Status Start/Stop Quantity Notes   Tobacco Never --/-- --  --          Review of Systems  · Constitutional  o Admits  o : fever, fatigue  o Denies  o : weight loss, weight gain  · Cardiovascular  o Denies  o : lower extremity edema, claudication, chest pressure, palpitations  · Respiratory  o Denies  o : shortness of breath, wheezing, cough, hemoptysis, dyspnea on exertion  · Gastrointestinal  o Denies  o : nausea, vomiting, diarrhea, constipation, abdominal pain      Vitals  Date Time BP Position Site L\R Cuff Size HR RR TEMP (F) WT  HT  BMI kg/m2 BSA m2 O2 Sat HC       02/05/2020 11:41 AM       16  184lbs 16oz 5'  3\" 32.77 1.93     02/14/2020 02:10 /74 Sitting    89 - R 16 98 188lbs 4oz 5'  3\" 33.35 1.95 100 %    06/15/2020 02:54 /58 Sitting    102 - R 18 98.5 184lbs 16oz 5'  3\" 32.77 1.93 98 %          Physical Examination  · Constitutional  o Appearance  o : no acute distress, well-nourished  · Head and Face  o Head  o :   § Inspection  § : atraumatic, normocephalic  · Eyes  o Eyes  o : extraocular movements intact, no scleral icterus, no conjunctival injection  · Ears, Nose, Mouth and Throat  o Ears  o :   § External Ears  § : normal  § Otoscopic Examination  § : tympanic membrane appearance within normal limits bilaterally  o Nose  o :   § Intranasal Exam  § : nares patent  o Oral Cavity  o :   § Oral Mucosa  § : moist mucous membranes  · Respiratory  o Respiratory Effort  o : breathing comfortably, symmetric chest rise  o Auscultation of Lungs  o : clear to asculatation bilaterally, no wheezes, rales, or rhonchii  · Cardiovascular  o Heart  o :   § Auscultation of Heart  § : regular rate and rhythm, no murmurs, rubs, or gallops  o Peripheral Vascular System  o :   § Extremities  § : no edema  · Lymphatic  o Neck  o : anterior and posterior cervical " lymphadenopathy  o Supraclavicular Nodes  o : no supraclavicular nodes  · Neurologic  o Mental Status Examination  o :   § Orientation  § : grossly oriented to person, place and time  o Gait and Station  o :   § Gait Screening  § : normal gait  · Psychiatric  o General  o : normal mood and affect     Neck-no nuchal rigidity on exam           Results  · In-Office Procedures  o Lab procedure  § IOP - Rapid Strep (32457)   § Beta Strep Gp A Culture: Negative   § Internal Control Verified?: Yes   § IOP - Influenza A/B Test (43461)   § Influenza A: Negative   § Influenza B: Negative   § Internal Control Verified?: Yes       Assessment  · Headache     784.0/R51  · Myalgia     729.1/M79.10  · Sore throat     462/J02.9  · Febrile illness, acute     780.60/R50.9  concern for covid-19, sending for testing today at 330. strep and flu negative. discussed potential for worsening symptoms and re-evaluation. She is willing to do self quarantine and monitor for worsening symptoms. discussed concern for nsaid use. advised using Tylenol prn pain/fever. Also discussed concern for severity of headache last night. Discussed need for further eval if this returns and persists or if she develops cognitive symptoms    Problems Reconciled  Plan  · Orders  o ACO-39: Current medications updated and reviewed () - - 06/15/2020  · Medications  o Medications have been Reconciled  o Transition of Care or Provider Policy  · Instructions  o Patient was educated/instructed on their diagnosis, treatment and medications prior to discharge from the clinic today.  o Call the office with any concerns or questions.  · Disposition  o Call or Return if symptoms worsen or persist.            Electronically Signed by: KAT Hood -Author on June 16, 2020 09:47:19 AM

## 2021-05-13 NOTE — PROGRESS NOTES
Progress Note      Patient Name: Griselda Henderson   Patient ID: [id number]   Sex: Female   YOB: 1940    Primary Care Provider: Dory STEPHENS   Referring Provider: Dory STEPHENS    Visit Date: August 18, 2020    Provider: AKT Willingham   Location: [facility]   Location Address: [address] [place] [address], [address]  [place], [place]  [id number]   Location Phone: [phone]          Chief Complaint  · Follow up  · Diabetes       History Of Present Illness  Griselda Henderson is a 79 year old /White female who presents for evaluation and treatment of:      Allergic rhinitis-   Well controlled with PRN Zyrtec.    GERD-  Well controlled with esomeprazole.    Hot flashes-  Patient has noticed an increase in frequency of hot flashes since her sugars have been higher, continues to be fairly well controlled with Effexor and low dose estradiol.    HLD-  Managed with statin. Well tolerated, denies leg cramping. Most recent LDL 48.    Hypothyroid-  Well controlled on previous labs, managed with Synthroid. Due for repeat thyroid profile.    OA-  Generalized, predominantly bilateral hands. Has improved slightly with Voltaren gel. She has not been taking Tylenol because she thought it was bad for her kidneys. Has noticed a decrease in  strength.     DM2-  Managed with metformin and pioglitazone. Metformin dosage decreased due to worsening renal function, however renal function continues to decrease even after adjustment. Most recent GFR 36. She has never seen a nephrologist. HgbA1c increased from 7.1 to 7.8. Patient reports fasting blood glucose readings have been slowly increasing as well. Diabetic foot exam and urine microalbumin: 2/2020; Diabetic eye exam: 12/2019. Pneumonia vaccination: Up to date. Renal protection: ACE.  Patient requesting prescription for glucometer test strips.       Past Medical History  Disease Name Date Onset Notes   Allergic rhinitis, chronic --  --     Basal Cell Carcinoma 2008 2008, 10/01/2012, 10/30/2012   Cataract --  --    COPD --  --    Deafness --  HEARING AID   Diabetes --  --    GERD --  --    Heart Murmur --  --    Hemorrhoid --  --    Hernia --  --    Night sweats --  --          Past Surgical History  Procedure Name Date Notes   Appendectomy --  --    Bowel Surgery 01/27/2020 CLOSED LOOP BOWEL OBSTRUCTION REMOVED   Breast 1970 --    Cataract surgery 01/23/2012 01/23/2012: LEFT EYE 03/05/2012: RIGHT EYE   Fasciotomy 09/2004 RIGHT HEEL   Heel Spur 01/2004 --    Hernia 2012 --    Hysterectomy-Abdominal 1981 --    Plantar Fasciitis 11/09/2011 LEFT FOOT   Tonsilectomy 1959 --    Venous ligation 2012 02/22/2012 RIGHT LEG VENOUS CLOSURE 02/29/2012 LEFT LEG VENOUS CLOSURE 03/07/2012 BILATERAL LEG BACK CLOSURE         Medication List  Name Date Started Instructions   Aspir-81 81 mg oral tablet,delayed release (DR/EC)  take 1 tablet (81 mg) by oral route once daily   atorvastatin 10 mg oral tablet 08/18/2020 take 1 tablet (10 mg) by oral route once daily at bedtime   cetirizine 10 mg oral tablet 08/18/2020 take 1 tablet by oral route daily   diclofenac sodium 1 % topical gel 08/03/2020 APPLY 4 GRAMS EXTERNALLY TO THE AFFECTED AREA FOUR TIMES DAILY AS NEEDED   esomeprazole magnesium 40 mg oral capsule,delayed release(DR/EC) 05/15/2020 take 1 capsule by oral route 2 times a day   estradiol 0.5 mg oral tablet 06/23/2020 take 0.5 tablet by oral route daily   levothyroxine 25 mcg oral tablet 07/10/2020 TAKE 1 TABLET(25 MCG) BY MOUTH EVERY DAY 60 MINUTES BEFORE BREAKFAST ON AN EMPTY STOMACH   lisinopril 5 mg oral tablet 08/18/2020 take 1 tablet (5 mg) by oral route once daily   magnesium 200 mg oral tablet  take 2 tablets by oral route daily   melatonin 10 mg oral capsule  --    metformin 1,000 mg oral tablet 08/18/2020 take 1 tablet by oral route daily   ONE-DAILY MULTI-VITAMIN TAB  --    potassium 99 mg oral tablet  --    Shingrix (PF) 50 mcg/0.5 mL intramuscular  "suspension for reconstitution 02/14/2020 inject 0.5 milliliter (50 mcg) by intramuscular route once   Synthroid 25 mcg oral tablet 07/10/2020 take 1 tablet (25 mcg) by oral route once daily on an empty stomach 60 minutes before breakfast   venlafaxine 75 mg oral tablet 08/18/2020 take 1 tablet by oral route daily   Vitamin D-3 with Aloe 120-1,000-10 mg-unit-mg oral tablet  take 1 tablet by oral route daily   Voltaren 1 % topical gel 05/15/2020 apply 4 grams to the affected area(s) by topical route 4 times per day as needed         Allergy List  Allergen Name Date Reaction Notes   Latex Exam Gloves --  --  --    PENICILLINS --  --  --          Family Medical History  Disease Name Relative/Age Notes   Stroke Father/   --    Heart Disease Mother/   --    Diabetes, unspecified type Brother/  Father/   Father; Brother   Mitral valve prolapse Mother/   --          Social History  Finding Status Start/Stop Quantity Notes   Tobacco Never --/-- --  --          Review of Systems  · Constitutional  o Denies  o : fever, fatigue, weight loss, weight gain  · Cardiovascular  o Denies  o : lower extremity edema, chest pressure, palpitations  · Respiratory  o Denies  o : shortness of breath, wheezing, cough, dyspnea on exertion  · Gastrointestinal  o Denies  o : nausea, vomiting, diarrhea, constipation, abdominal pain  · Musculoskeletal  o Admits  o : joint pain, limitation of motion, hand pain  · Endocrine  o Admits  o : weight gain, hot flashes  o Denies  o : weight loss      Vitals  Date Time BP Position Site L\R Cuff Size HR RR TEMP (F) WT  HT  BMI kg/m2 BSA m2 O2 Sat        02/14/2020 02:10 /74 Sitting    89 - R 16 98 188lbs 4oz 5'  3\" 33.35 1.95 100 %    06/15/2020 02:54 /58 Sitting    102 - R 18 98.5 184lbs 16oz 5'  3\" 32.77 1.93 98 %    08/18/2020 02:47 /58 Sitting    84 - R  97.7 196lbs 4oz 5'  3\" 34.76 1.99 97 %          Physical Examination  · Constitutional  o Appearance  o : no acute distress, " well-nourished  · Head and Face  o Head  o :   § Inspection  § : atraumatic, normocephalic  · Ears, Nose, Mouth and Throat  o Ears  o :   § External Ears  § : normal  o Nose  o :   § Intranasal Exam  § : nares patent  o Oral Cavity  o :   § Oral Mucosa  § : moist mucous membranes  · Neck  o Thyroid  o : gland size normal, nontender, no nodules or masses present on palpation  · Respiratory  o Respiratory Effort  o : breathing comfortably, symmetric chest rise  o Auscultation of Lungs  o : clear to asculatation bilaterally, no wheezes, rales, or rhonchii  · Cardiovascular  o Heart  o :   § Auscultation of Heart  § : regular rate and rhythm, no murmurs, rubs, or gallops  o Peripheral Vascular System  o :   § Extremities  § : no edema  · Lymphatic  o Neck  o : no lymphadenopathy present  o Supraclavicular Nodes  o : no supraclavicular nodes  · Skin and Subcutaneous Tissue  o General Inspection  o : no rashes present, no lesions present, no areas of discoloration  · Neurologic  o Mental Status Examination  o :   § Orientation  § : grossly oriented to person, place and time  o Gait and Station  o :   § Gait Screening  § : normal gait              Assessment  · Diabetes mellitus, type 2     250.00/E11.9  Poorly controlled with adjustment of metformin, HgbA1c 7.8. Continue renal dosage of metformin, discontinue pioglitazone. Will start low dose Farxiga. Patient to monitor blood glucose levels and call with consistent elevations/lows. Will repeat CMP to assess GFR in one month, if stable or improved goal would be to increase Farxiga to 10 mg and monitor renal function closely. Referral to nephrology for further evaluation. May consider increasing ACE to 10 mg in the future. Diabetic foot exam and urine microalbumin: 2/2020; Diabetic eye exam: 12/2019. Pneumonia vaccination: Up to date. Renal protection: ACE.   · GERD (gastroesophageal reflux disease)     530.81/K21.9  Well controlled, continue esomeprazole. Will continue to  monitor.   · Hyperlipidemia     272.4/E78.5  Well controlled, continue statin. Most recent LDL 48.  · Hypothyroidism     244.9/E03.9  Well controlled, continue Synthroid. Will repeat thyroid profile in one month with pending labs.  · Decreased GFR     794.4/R94.4  · Hot flashes     782.62/R23.2  · Allergic rhinitis     477.9/J30.9  Well controlled, continue Zyrtec.  · Hormone replacement therapy, postmenopausal     V07.4/Z79.890  Patient aware of risks associated with hormone replacement therapy, will continue low dose estradiol and Effexor for management of hot flashes.  · Osteoarthritis, hand     715.94/M19.049  Discussed conservative care, including ROM exercises, heat/ice,  strength training. Will trial lidocaine gel and PRN Tylenol. Will continue to monitor, patient will call or return to clinic with concerns.    Problems Reconciled  Plan  · Orders  o CMP [facility] (35267) - 250.00/E11.9 - 09/13/2020  o Thyroid Profile (91538, 62087, THYII) - 250.00/E11.9 - 09/13/2020  o ACO-39: Current medications updated and reviewed () - - 08/18/2020  o NEPHROLOGY CONSULTATION (NEPHR) - 250.00/E11.9, 794.4/R94.4 - 08/18/2020  · Medications  o Farxiga 5 mg oral tablet   SIG: take 1 tablet (5 mg) by oral route once daily in the morning   DISP: (30) tablets with 1 refills  Prescribed on 08/18/2020     o True Metrix Glucose Test Strip miscellaneous strip   SIG: use as directed   DISP: (4) 25 ct box with 2 refills  Prescribed on 08/18/2020     o lidocaine 5 % topical cream   SIG: apply to affected area(s) by topical route 2 times a day   DISP: (1) 15 gm tube with 2 refills  Prescribed on 08/18/2020     o pioglitazone 45 mg oral tablet   SIG: take 1 tablet (45 mg) by oral route once daily   DISP: (30) tablet with 2 refills  Discontinued on 08/18/2020     o Medications have been Reconciled  o Transition of Care or Provider Policy  · Instructions  o Continue blood sugar monitoring daily and record. Bring your log to office  visits. Call the office for readings below 70 and above 250 or any complications.  o Daily foot care. Avoid walking barefoot. Annual Dilated Eye Exam.  o Discussed with patient blood pressure monitoring, hemoglobin A1C levels need to be below 7.0, and LDL (Lipid) goals below 70.  o Advised that cheeses and other sources of dairy fats, animal fats, fast food, and the extras (candy, pastries, pies, doughnuts and cookies) all contain LDL raising nutrients. Advised to increase fruits, vegetables, whole grains, and to monitor portion sizes.   o Take all medications as prescribed/directed.  o Patient was educated/instructed on their diagnosis, treatment and medications prior to discharge from the clinic today.  o Patient instructed to seek medical attention urgently for new or worsening symptoms.  o Call the office with any concerns or questions.  o Chronic conditions reviewed and taken into consideration for today's treatment plan.  · Disposition  o Call or Return if symptoms worsen or persist.  o Follow up in 3 months  o Prescriptions sent to pharmacy  o Please print labs  o Will call patient with results of labs            Electronically Signed by: KAT Willingham -Author on August 18, 2020 05:06:39 PM

## 2021-05-13 NOTE — PROGRESS NOTES
"   Quick Note      Patient Name: Griselda Henderson   Patient ID: [id number]   Sex: Female   YOB: 1940    Primary Care Provider: Dory STEPHENS   Referring Provider: Dory STEPHENS    Visit Date: May 15, 2020    Provider: KAT Willingham   Location: [facility]   Location Address: [address] [place] [address], [facility] [address]  [place], [place]  [id number]   Location Phone: [phone]          History Of Present Illness  TELEHEALTH TELEPHONE VISIT  Chief Complaint: Telehealth;Phone call   Griselda Henderson is a 79 year old /White female who is presenting for evaluation via telehealth telephone visit. Verbal consent obtained before beginning visit.   Provider spent 15 minutes with the patient during telehealth visit.   The following staff were present during this visit: Rowena Dickens MA; KAT Willingham   Past Medical History/Overview of Patient Symptoms     Informed patient that as visit is being performed as a Telehealth visit there will be no opportunity to obtain vital signs or perform a physical exam. Due to this there is unfortunately a possibility that things may be missed that would typically be noticed during a traditional visit. Patient is aware of this possibility and agrees to proceed with Telehealth visit. Patient states there is no other person present for this phone call.     Phone call started: 1307  Phone call ended: 1322    Allergic rhinitis-  Well controlled with Zyrtec.    GERD-  Well controlled with esomeprazole.    Hot flashes-  Managed with venlafaxine and estradiol. Patient aware of risks associated with estradiol, states she has tried to come off of this multiple times in the past and \"side effects were unbearable\".      DM2-  Most recent HgbA1c 7.1. Patient managed with pioglitazone and metformin. She does not check her blood sugars at home. Denies lows. Diabetic foot exam: 2/2020. Urine microalbumin: 2/2020. Diabetic eye exam: 12/2019. Up to date on " pneumonia vaccination. On ACE for renal protection.    HLD-  Managed with statin. Well tolerated, denies leg cramping. Most recent LDL 48.    Hypothyroid-  Recent labs abnormal. Patient reports history of thyroid replacement therapy as a child/teenager for a few years. States she was taken off and has never been told she needed replacement since.     Osteoarthritis-  Predominantly bilateral hands. Patient treating with Biofreeze and Cryoderm. States she has been having trouble with , has started doing  exercises at home.              Assessment  · Allergic rhinitis     477.9/J30.9  Well controlled, continue Zyrtec.   · Hypothyroid     244.9/E03.9  Synthroid to pharmacy, repeat thyroid profile as ordered in six weeks.   · GERD (gastroesophageal reflux disease)     530.81/K21.9  Well controlled, continue esomeprazole.   · Diabetes mellitus, type 2     250.00/E11.9  Previous HgbA1c 7.1, will continue pioglitazone and metformin at this time. Patient to continue to work on lifestyle modifications, aware of goal less than 7. Up to date on urine microalbumin, diabetic eye exam, diabetic foot exam, pneumonia vaccination. Encouraged patient to monitor blood glucose levels at home and to call if consistently elevated. Repeat labs in three months.   · Hot flashes     782.62/R23.2  Will continue venlafaxine and estradiol. Patient aware of risks associated with oral hormone replacement. Will continue to monitor.   · Hyperlipidemia     272.4/E78.5  Well controlled, continue statin. Most recent LDL 48.  · Osteoarthritis     715.90/M19.90  Bilateral hands. Will trial Voltaren gel for pain relief. Patient to call or return to clinic fi symptoms worsen or persist.     Problems Reconciled  Plan  · Orders  o Physician Telephone Evaluation, 11-20 minutes (16718) - - 05/15/2020  · Medications  o Voltaren 1 % topical gel   SIG: apply 4 grams to the affected area(s) by topical route 4 times per day as needed   DISP: (1) 100 gm tube  with 0 refills  Prescribed on 05/15/2020     o Synthroid 25 mcg oral tablet   SIG: take 1 tablet (25 mcg) by oral route once daily on an empty stomach 60 minutes before breakfast   DISP: (30) tablets with 1 refills  Prescribed on 05/15/2020     o cetirizine 10 mg oral tablet   SIG: take 1 tablet by oral route daily   DISP: (90) tablet with 0 refills  Adjusted on 05/15/2020     o esomeprazole magnesium 40 mg oral capsule,delayed release(DR/EC)   SIG: take 1 capsule by oral route 2 times a day   DISP: (180) capsule with 0 refills  Adjusted on 05/15/2020     o estradiol 0.5 mg oral tablet   SIG: take 0.5 tablet by oral route daily   DISP: (90) Tablet with 0 refills  Adjusted on 05/15/2020     o lisinopril 5 mg oral tablet   SIG: take 1 tablet (5 mg) by oral route once daily   DISP: (90) tablet with 0 refills  Adjusted on 05/15/2020     o metformin 1,000 mg oral tablet   SIG: take 1 tablet (1,000 mg) by oral route 2 times per day with morning and evening meals   DISP: (180) tablet with 0 refills  Adjusted on 05/15/2020     o atorvastatin 10 mg oral tablet   SIG: take 1 tablet (10 mg) by oral route once daily at bedtime   DISP: (30) tablets with 2 refills  Refilled on 05/15/2020     o Medications have been Reconciled  o Transition of Care or Provider Policy  · Instructions  o Chronic conditions reviewed and taken into consideration for today's treatment plan.  o Patient instructed to seek medical attention urgently for new or worsening symptoms.  o Patient was educated/instructed on their diagnosis, treatment and medications prior to discharge from the clinic today.  · Disposition  o Call or Return if symptoms worsen or persist.  o Follow up in 3 months  o Prescriptions sent to pharmacy            Electronically Signed by: KAT Willingham -Author on May 15, 2020 02:28:44 PM

## 2021-05-13 NOTE — PROGRESS NOTES
Progress Note      Patient Name: Griselda Henderson   Patient ID: [id number]   Sex: Female   YOB: 1940    Primary Care Provider: Dory STEPHENS   Referring Provider: Dory STEPHENS    Visit Date: November 18, 2020    Provider: KAT Willingham   Location: [facility]   Location Address: [address] [place] [address], [facility] [address]  [place], [place]  [id number]   Location Phone: [phone]          Chief Complaint  · Follow-up  · Hyperlipidemia  · Discuss lab results      History Of Present Illness  Griselda Henderson is a 80 year old /White female who presents for evaluation and treatment of:      Allergic rhinitis-  Would like prescription for Zyrtec.     GERD-  Well controlled with esomeprazole.    Hot flashes-  Well controlled with Effexor and low dose estradiol. Patient aware of risks of hormone replacement and is not interested in cessation.    HLD-  Managed with statin. Well tolerated, denies leg cramping. Most recent LDL 48.    Hypothyroid-  Managed with Synthroid, well controlled on recent labs.    DM2-  Managed with metformin and Farxiga. HgbA1c has increased to 9.3 since patient was taken off of metformin due to decreased kidney function. Patient recently followed up with nephrology and was told she could restart higher dose of metformin. She has not been checking her blood glucose because she has not been able to get her test strips. Patient states [facility] keeps telling her they need something from her providers office before they can fill them.     CKD-  Most recent GFR 37. Scheduled to follow up with nephrology next month.    Reviewed recent labs with patient.       Past Medical History  Disease Name Date Onset Notes   Allergic rhinitis, chronic --  --    Basal Cell Carcinoma 2008 2008, 10/01/2012, 10/30/2012   Cataract --  --    COPD --  --    Deafness --  HEARING AID   Diabetes --  --    GERD --  --    Heart Murmur --  --    Hemorrhoid --  --    Hernia --  --    Night  sweats --  --          Past Surgical History  Procedure Name Date Notes   Appendectomy --  --    Bowel Surgery 01/27/2020 CLOSED LOOP BOWEL OBSTRUCTION REMOVED   Breast 1970 --    Cataract surgery 01/23/2012 01/23/2012: LEFT EYE 03/05/2012: RIGHT EYE   Fasciotomy 09/2004 RIGHT HEEL   Heel Spur 01/2004 --    Hernia 2012 --    Hysterectomy-Abdominal 1981 --    Plantar Fasciitis 11/09/2011 LEFT FOOT   Tonsilectomy 1959 --    Venous ligation 2012 02/22/2012 RIGHT LEG VENOUS CLOSURE 02/29/2012 LEFT LEG VENOUS CLOSURE 03/07/2012 BILATERAL LEG BACK CLOSURE         Medication List  Name Date Started Instructions   Aspir-81 81 mg oral tablet,delayed release (DR/EC)  take 1 tablet (81 mg) by oral route once daily   atorvastatin 10 mg oral tablet 08/18/2020 take 1 tablet (10 mg) by oral route once daily at bedtime   cetirizine 10 mg oral tablet 11/18/2020 take 1 tablet by oral route daily   diclofenac sodium 1 % topical gel 08/03/2020 APPLY 4 GRAMS EXTERNALLY TO THE AFFECTED AREA FOUR TIMES DAILY AS NEEDED   esomeprazole magnesium 40 mg oral capsule,delayed release(DR/EC) 11/18/2020 take 1 capsule by oral route 2 times a day   estradiol 0.5 mg oral tablet 06/23/2020 take 0.5 tablet by oral route daily   Farxiga 10 mg oral tablet 11/18/2020 take 1 tablet (10 mg) by oral route once daily in the morning for 90 days   levothyroxine 25 mcg oral tablet 07/10/2020 TAKE 1 TABLET(25 MCG) BY MOUTH EVERY DAY 60 MINUTES BEFORE BREAKFAST ON AN EMPTY STOMACH   lidocaine 5 % topical cream 08/18/2020 apply to affected area(s) by topical route 2 times a day   lisinopril 5 mg oral tablet 08/18/2020 take 1 tablet (5 mg) by oral route once daily   magnesium 200 mg oral tablet  take 2 tablets by oral route daily   melatonin 10 mg oral capsule  --    metformin 1,000 mg oral tablet 11/18/2020 take 1 tablet by oral route 2 times a day for 90 days   ONE-DAILY MULTI-VITAMIN TAB  --    Shingrix (PF) 50 mcg/0.5 mL intramuscular suspension for  reconstitution 02/14/2020 inject 0.5 milliliter (50 mcg) by intramuscular route once   Synthroid 25 mcg oral tablet 07/10/2020 take 1 tablet (25 mcg) by oral route once daily on an empty stomach 60 minutes before breakfast   True Metrix Glucose Test Strip miscellaneous strip 08/18/2020 use as directed   venlafaxine 75 mg oral tablet 08/18/2020 take 1 tablet by oral route daily   Vitamin D-3 with Aloe 120-1,000-10 mg-unit-mg oral tablet  take 1 tablet by oral route daily   Voltaren 1 % topical gel 05/15/2020 apply 4 grams to the affected area(s) by topical route 4 times per day as needed         Allergy List  Allergen Name Date Reaction Notes   Latex Exam Gloves --  --  --    PENICILLINS --  --  --        Allergies Reconciled  Family Medical History  Disease Name Relative/Age Notes   Stroke Father/   --    Heart Disease Mother/   --    Diabetes, unspecified type Brother/  Father/   Father; Brother   Mitral valve prolapse Mother/   --          Social History  Finding Status Start/Stop Quantity Notes   Tobacco Never --/-- --  --          Review of Systems  · Constitutional  o Denies  o : fever, fatigue, weight loss, weight gain  · HENT  o Denies  o : headaches, vertigo, lightheadedness  · Cardiovascular  o Denies  o : lower extremity edema, chest pressure, palpitations  · Respiratory  o Denies  o : shortness of breath, wheezing, cough, dyspnea on exertion  · Gastrointestinal  o Denies  o : nausea, vomiting, diarrhea, constipation, abdominal pain  · Genitourinary  o Denies  o : urgency, frequency, dysuria  · Musculoskeletal  o Admits  o : joint pain, neck pain, knee pain, hand pain  · Endocrine  o Admits  o : hot flashes  o Denies  o : polyuria, polydipsia, galactorrhea  · Psychiatric  o Denies  o : anxiety, depression, suicidal ideation, homicidal ideation      Vitals  Date Time BP Position Site L\R Cuff Size HR RR TEMP (F) WT  HT  BMI kg/m2 BSA m2 O2 Sat FR L/min FiO2 HC       06/15/2020 02:54 /58 Sitting    102  "- R 18 98.5 184lbs 16oz 5'  3\" 32.77 1.93 98 %  21%    08/18/2020 02:47 /58 Sitting    84 - R  97.7 196lbs 4oz 5'  3\" 34.76 1.99 97 %  21%    11/18/2020 03:57 /70 Sitting    92 - R  97.2 194lbs 2oz 5'  3\" 34.39 1.98 97 %  21%          Physical Examination  · Constitutional  o Appearance  o : no acute distress, well-nourished  · Head and Face  o Head  o :   § Inspection  § : atraumatic, normocephalic  · Ears, Nose, Mouth and Throat  o Ears  o :   § External Ears  § : normal  o Nose  o :   § Intranasal Exam  § : nares patent  o Oral Cavity  o :   § Oral Mucosa  § : moist mucous membranes  · Neck  o Thyroid  o : gland size normal, nontender, no nodules or masses present on palpation, symmetric  · Respiratory  o Respiratory Effort  o : breathing comfortably, symmetric chest rise  o Auscultation of Lungs  o : clear to asculatation bilaterally, no wheezes, rales, or rhonchii  · Cardiovascular  o Heart  o :   § Auscultation of Heart  § : regular rate and rhythm, no murmurs, rubs, or gallops  o Peripheral Vascular System  o :   § Extremities  § : no edema  · Lymphatic  o Neck  o : no lymphadenopathy present  o Supraclavicular Nodes  o : no supraclavicular nodes  · Skin and Subcutaneous Tissue  o General Inspection  o : no lesions present, no areas of discoloration, skin turgor normal  · Neurologic  o Mental Status Examination  o :   § Orientation  § : grossly oriented to person, place and time  o Gait and Station  o :   § Gait Screening  § : normal gait              Assessment  · Diabetes mellitus, type 2     250.00/E11.9  Will increase metformin to BID dosing and increase Farxiga to 10 mg. Contacted pharmacy, working on getting strips approved for patient. She will monitor blood glucose levels and call with consistent elevations. Will follow up in three months to repeat HgbA1c, sooner if concerns arise. Will also closely monitor kidney function to ensure this does not worsen with the increased dosage of " metformin. Diabetic foot exam: 2/2020. Diabetic eye exam: 12/2019. Urine microalbumin: 2/2020. Pneumonia vaccination: Up to date. Renal protection: ACE.   · GERD (gastroesophageal reflux disease)     530.81/K21.9  Well controlled with esomeprazole.   · Hyperlipidemia     272.4/E78.5  Well controlled, continue statin. Most recent LDL 59.  · Hypothyroidism     244.9/E03.9  Well controlled on recent labs, continue Synthroid.   · CKD (chronic kidney disease) stage 3, GFR 30-59 ml/min     585.3/N18.3  Continue to follow with nephrology as scheduled, will continue to monitor.   · Hot flashes     782.62/R23.2  Well controlled with Effexor and estradiol. Patient aware of risks of hormone replacement therapy, she is not interested in cessation at this time.   · Allergic rhinitis     477.9/J30.9  Zyrtec to pharmacy.     Problems Reconciled  Plan  · Orders  o ACO-39: Current medications updated and reviewed (, 1159F) - - 11/18/2020  o ACO-14: Influenza immunization administered or previously received [facility] () - - 11/18/2020  · Medications  o Farxiga 10 mg oral tablet   SIG: take 1 tablet (10 mg) by oral route once daily in the morning for 90 days   DISP: (90) Tablet with 1 refills  Adjusted on 11/18/2020     o metformin 1,000 mg oral tablet   SIG: take 1 tablet by oral route 2 times a day for 90 days   DISP: (180) Tablet with 1 refills  Adjusted on 11/18/2020     o diclofenac sodium 1 % topical gel   SIG: APPLY 4 GRAMS EXTERNALLY TO THE AFFECTED AREA FOUR TIMES DAILY AS NEEDED   DISP: (100) Gram with 2 refills  Refilled on 11/18/2020     o Medications have been Reconciled  o Transition of Care or Provider Policy  · Instructions  o Continue blood sugar monitoring daily and record. Bring your log to office visits. Call the office for readings below 70 and above 250 or any complications.  o Daily foot care. Avoid walking barefoot. Annual Dilated Eye Exam.  o Discussed with patient blood pressure monitoring, hemoglobin A1C  levels need to be below 7.0, and LDL (Lipid) goals below 70.  o Advised that cheeses and other sources of dairy fats, animal fats, fast food, and the extras (candy, pastries, pies, doughnuts and cookies) all contain LDL raising nutrients. Advised to increase fruits, vegetables, whole grains, and to monitor portion sizes.   o Take all medications as prescribed/directed.  o Patient was educated/instructed on their diagnosis, treatment and medications prior to discharge from the clinic today.  o Patient instructed to seek medical attention urgently for new or worsening symptoms.  o Call the office with any concerns or questions.  o Chronic conditions reviewed and taken into consideration for today's treatment plan.  · Disposition  o Call or Return if symptoms worsen or persist.  o Follow up in 3 months  o Prescriptions sent to pharmacy            Electronically Signed by: KAT Willingham -Author on November 18, 2020 05:18:51 PM

## 2021-05-14 VITALS
OXYGEN SATURATION: 97 % | TEMPERATURE: 97.7 F | HEIGHT: 63 IN | BODY MASS INDEX: 34.77 KG/M2 | DIASTOLIC BLOOD PRESSURE: 58 MMHG | WEIGHT: 196.25 LBS | SYSTOLIC BLOOD PRESSURE: 132 MMHG | HEART RATE: 84 BPM

## 2021-05-14 VITALS
BODY MASS INDEX: 32.8 KG/M2 | WEIGHT: 185.12 LBS | DIASTOLIC BLOOD PRESSURE: 74 MMHG | TEMPERATURE: 98.3 F | HEART RATE: 94 BPM | SYSTOLIC BLOOD PRESSURE: 122 MMHG | HEIGHT: 63 IN | OXYGEN SATURATION: 99 %

## 2021-05-14 VITALS
HEART RATE: 92 BPM | DIASTOLIC BLOOD PRESSURE: 70 MMHG | WEIGHT: 194.12 LBS | OXYGEN SATURATION: 97 % | BODY MASS INDEX: 34.39 KG/M2 | TEMPERATURE: 97.2 F | HEIGHT: 63 IN | SYSTOLIC BLOOD PRESSURE: 132 MMHG

## 2021-05-14 VITALS
WEIGHT: 184 LBS | TEMPERATURE: 97.8 F | OXYGEN SATURATION: 97 % | DIASTOLIC BLOOD PRESSURE: 78 MMHG | BODY MASS INDEX: 32.6 KG/M2 | HEART RATE: 86 BPM | SYSTOLIC BLOOD PRESSURE: 126 MMHG | HEIGHT: 63 IN

## 2021-05-14 NOTE — PROGRESS NOTES
Progress Note      Patient Name: Griselda Henderson   Patient ID: [id number]   Sex: Female   YOB: 1940    Primary Care Provider: Dory STEPHENS   Referring Provider: Dory STEPHENS    Visit Date: February 19, 2021    Provider: KAT Willingham   Location: [facility]   Location Address: [address] [place] [address]  [place], [place]  [id number]   Location Phone: [phone]          Chief Complaint  · Diabetes mellitus, type 2   · HTN  · Hyperlipidemia       History Of Present Illness  Griselda Henderson is a 80 year old /White female who presents for evaluation and treatment of:      DM2-  Currently managed with metformin and Farxiga. She has not been checking her blood sugar at home because she has not been able to get her test strips. Most recent HgbA1c 9.3 prior to medication changes. Patient has been working on improving her diet and has lost 9 pounds. Diabetic eye exam: 2020. Diabetic foot exam: 2/2020, due. Urine microalbumin: 2/2020. Renal protection: ACE. Pneumonia vaccination: Up to date.    Allergic rhinitis-  Well controlled with Zyrtec.    CKD-  Most recent GFR 37. Scheduled with nephrology next month for routine follow up.    HLD-  Well controlled with statin. Well tolerated, denies leg cramping. Most recent LDL 59.     Hot flashes-  Well controlled with Effexor and estradiol. Patient aware of risks associated with hormone replacement.     GERD-  Well controlled with esomeprazole.     Hypothyroid-  Managed with Synthroid. Well controlled on previous labs.       Past Medical History  Disease Name Date Onset Notes   Allergic rhinitis, chronic --  --    Basal Cell Carcinoma 2008 2008, 10/01/2012, 10/30/2012   Cataract --  --    COPD --  --    Deafness --  HEARING AID   Diabetes --  --    GERD --  --    Heart Murmur --  --    Hemorrhoid --  --    Hernia --  --    Night sweats --  --          Past Surgical History  Procedure Name Date Notes   Appendectomy --  --     Bowel Surgery 01/27/2020 CLOSED LOOP BOWEL OBSTRUCTION REMOVED   Breast 1970 --    Cataract surgery 01/23/2012 01/23/2012: LEFT EYE 03/05/2012: RIGHT EYE   Fasciotomy 09/2004 RIGHT HEEL   Heel Spur 01/2004 --    Hernia 2012 --    Hysterectomy-Abdominal 1981 --    Plantar Fasciitis 11/09/2011 LEFT FOOT   Tonsilectomy 1959 --    Venous ligation 2012 02/22/2012 RIGHT LEG VENOUS CLOSURE 02/29/2012 LEFT LEG VENOUS CLOSURE 03/07/2012 BILATERAL LEG BACK CLOSURE         Medication List  Name Date Started Instructions   Aspir-81 81 mg oral tablet,delayed release (DR/EC)  take 1 tablet (81 mg) by oral route once daily   atorvastatin 10 mg oral tablet 08/18/2020 take 1 tablet (10 mg) by oral route once daily at bedtime   cetirizine 10 mg oral tablet 02/19/2021 take 1 tablet by oral route daily   diclofenac sodium 1 % topical gel 11/18/2020 APPLY 4 GRAMS EXTERNALLY TO THE AFFECTED AREA FOUR TIMES DAILY AS NEEDED   esomeprazole magnesium 40 mg oral capsule,delayed release(DR/EC) 11/18/2020 take 1 capsule by oral route 2 times a day   estradiol 0.5 mg oral tablet 06/23/2020 take 0.5 tablet by oral route daily   Farxiga 10 mg oral tablet 11/18/2020 take 1 tablet (10 mg) by oral route once daily in the morning for 90 days   levothyroxine 25 mcg oral tablet 02/19/2021 TAKE 1 TABLET(25 MCG) BY MOUTH EVERY DAY 60 MINUTES BEFORE BREAKFAST ON AN EMPTY STOMACH   lidocaine 5 % topical cream 08/18/2020 apply to affected area(s) by topical route 2 times a day   lisinopril 5 mg oral tablet 08/18/2020 take 1 tablet (5 mg) by oral route once daily   magnesium 200 mg oral tablet  take 2 tablets by oral route daily   melatonin 10 mg oral capsule  --    metformin 1,000 mg oral tablet 11/18/2020 take 1 tablet by oral route 2 times a day for 90 days   ONE-DAILY MULTI-VITAMIN TAB  --    Synthroid 25 mcg oral tablet 07/10/2020 take 1 tablet (25 mcg) by oral route once daily on an empty stomach 60 minutes before breakfast   True Metrix Glucose Test  "Strip miscellaneous strip 02/19/2021 use as directed   venlafaxine 75 mg oral tablet 08/18/2020 take 1 tablet by oral route daily   Vitamin D-3 with Aloe 120-1,000-10 mg-unit-mg oral tablet  take 1 tablet by oral route daily   Voltaren 1 % topical gel 05/15/2020 apply 4 grams to the affected area(s) by topical route 4 times per day as needed         Allergy List  Allergen Name Date Reaction Notes   Latex Exam Gloves --  --  --    PENICILLINS --  --  --        Allergies Reconciled  Family Medical History  Disease Name Relative/Age Notes   Stroke Father/   --    Heart Disease Mother/   --    Diabetes, unspecified type Brother/  Father/   Father; Brother   Mitral valve prolapse Mother/   --          Social History  Finding Status Start/Stop Quantity Notes   Tobacco Never --/-- --  --          Vitals  Date Time BP Position Site L\R Cuff Size HR RR TEMP (F) WT  HT  BMI kg/m2 BSA m2 O2 Sat FR L/min FiO2 HC       08/18/2020 02:47 /58 Sitting    84 - R  97.7 196lbs 4oz 5'  3\" 34.76 1.99 97 %  21%    11/18/2020 03:57 /70 Sitting    92 - R  97.2 194lbs 2oz 5'  3\" 34.39 1.98 97 %  21%    02/19/2021 01:15 /74 Sitting    94 - R  98.3 185lbs 2oz 5'  3\" 32.79 1.93 99 %  21%          Physical Examination  · Constitutional  o Appearance  o : no acute distress, well-nourished  · Head and Face  o Head  o :   § Inspection  § : atraumatic, normocephalic  · Ears, Nose, Mouth and Throat  o Ears  o :   § External Ears  § : normal  o Nose  o :   § Intranasal Exam  § : nares patent  o Oral Cavity  o :   § Oral Mucosa  § : moist mucous membranes  · Neck  o Thyroid  o : gland size normal, nontender, no nodules or masses present on palpation, symmetric  · Respiratory  o Respiratory Effort  o : breathing comfortably, symmetric chest rise  o Auscultation of Lungs  o : clear to asculatation bilaterally, no wheezes, rales, or rhonchii  · Cardiovascular  o Heart  o :   § Auscultation of Heart  § : regular rate and rhythm, no " murmurs, rubs, or gallops  o Peripheral Vascular System  o :   § Extremities  § : no edema  · Lymphatic  o Neck  o : no lymphadenopathy present  o Supraclavicular Nodes  o : no supraclavicular nodes  · Skin and Subcutaneous Tissue  o General Inspection  o : no lesions present, no areas of discoloration, skin turgor normal  · Neurologic  o Mental Status Examination  o :   § Orientation  § : grossly oriented to person, place and time  o Gait and Station  o :   § Gait Screening  § : normal gait              Assessment  · Diabetes mellitus, type 2     250.00/E11.9  Poorly controlled on previous labs, HgbA1c 9.3. Medications were adjusted at that time. Continue metformin and Farxiga, repeat HgbA1c in clinic today. Could consider GLP1 in the future if indicated. Pharmacy contacted again regarding test strips, awaiting prior authorization paperwork. Diabetic eye exam: 2020. Diabetic foot exam: 2/2020. Urine microalbumin: Today. Renal protection: ACE. Pneumonia vaccination: Up to date.  · GERD (gastroesophageal reflux disease)     530.81/K21.9  Well controlled, continue esomeprazole.   · Hyperlipidemia     272.4/E78.5  Well controlled, continue statin. Most recent LDL 59. Repeat lipid panel in clinic today.   · Hypothyroidism     244.9/E03.9  Well controlled, continue Synthroid. Thyroid profile in clinic today.   · Allergic rhinitis     477.9/J30.9  Well controlled, continue Zyrtec.   · Hot flashes     782.62/R23.2  Continue Effexor and estradiol. Patient aware of risks associated with long term hormone replacement therapy, she is not interested in cessation at this time. Will continue to monitor.   · CKD (chronic kidney disease) stage 3, GFR 30-59 ml/min     585.3/N18.30  Most recent GFR 37. CMP in clinic today. Follow up with nephrology as scheduled. Will continue to monitor.     Problems Reconciled  Plan  · Orders  o CBC with Auto Diff [facility] (65441) - 250.00/E11.9 - 02/19/2021  o CMP [facility] ([id number]) - 250.00/E11.9 -  02/19/2021  o Hgb A1c [facility] ([id number]) - 250.00/E11.9 - 02/19/2021  o Lipid Panel [facility] ([id number]) - 250.00/E11.9 - 02/19/2021  o Urine microalbumin (03991) - 250.00/E11.9 - 02/19/2021  o Thyroid Profile (99515, THYII, 45190) - 244.9/E03.9 - 02/19/2021  o ACO-15: Pneumococcal Vaccine Administered or Previously Received [facility] (4040F) - - 02/19/2021  o ACO-39: Current medications updated and reviewed (1159F, ) - - 02/19/2021  · Medications  o Medications have been Reconciled  o Transition of Care or Provider Policy  · Instructions  o Continue blood sugar monitoring daily and record. Bring your log to office visits. Call the office for readings below 70 and above 250 or any complications.  o Daily foot care. Avoid walking barefoot. Annual Dilated Eye Exam.  o Discussed with patient blood pressure monitoring, hemoglobin A1C levels need to be below 7.0, and LDL (Lipid) goals below 70.  o Maintain a healthy weight. Avoid tight fitting clothes. Avoid fried, fatty foods, tomato sauce, chocolate, mint, garlic, onion, alcohol. caffeine. Eat smaller meals, dont lie down after a meal, dont smoke. Elevate the head of your bed 6-9 inches.  o Advised that cheeses and other sources of dairy fats, animal fats, fast food, and the extras (candy, pastries, pies, doughnuts and cookies) all contain LDL raising nutrients. Advised to increase fruits, vegetables, whole grains, and to monitor portion sizes.   o Take all medications as prescribed/directed.  o Patient was educated/instructed on their diagnosis, treatment and medications prior to discharge from the clinic today.  o Patient instructed to seek medical attention urgently for new or worsening symptoms.  o Call the office with any concerns or questions.  o Chronic conditions reviewed and taken into consideration for today's treatment plan.  · Disposition  o Call or Return if symptoms worsen or persist.  o Follow up in 3 months  o Labs drawn in clinic  o Will call patient with results of  labs            Electronically Signed by: KAT Willingham -Author on February 19, 2021 04:46:09 PM

## 2021-05-14 NOTE — PROGRESS NOTES
Progress Note      Patient Name: Griselda Henderson   Patient ID: [id number]   Sex: Female   YOB: 1940    Primary Care Provider: Dory STEPHENS   Referring Provider: Dory STEPHENS    Visit Date: March 19, 2021    Provider: KAT Willingham   Location: [facility]   Location Address: [address] [place] [address]  [id number]   Location Phone: [phone]          Chief Complaint  · Follow-up  · Diabetes mellitus, type 2  · Discuss medications options      History Of Present Illness  TELEHEALTH TELEPHONE VISIT  Griselda Henderson is a 80 year old /White female who is presenting for evaluation via telehealth telephone visit. Verbal consent obtained before beginning visit.   Provider spent 11 minutes with the patient during the telehealth visit.   The following staff were present during this visit: Franca Brown MA; KAT Willingham   Past Medical History/ Overview of Patient Symptoms     Informed patient that as visit is being performed as a Telehealth visit there will be no opportunity to obtain vital signs or perform a physical exam. Due to this there is unfortunately a possibility that things may be missed that would typically be noticed during a traditional visit. Patient is aware of this possibility and agrees to proceed with Telehealth visit. Patient states there is no other person present for this phone call.     Phone call started: 1422  Phone call ended: 1433    DM2-  Currently managed with Farxiga and metformin, switching to Jardiance due to better cost. Most recent HgbA1c 8.2, patient wanting to discuss medication options in more detail. Reports home blood glucose readings 130s-160s. Denies lows.       Past Medical History  Disease Name Date Onset Notes   Allergic rhinitis, chronic --  --    Basal Cell Carcinoma 2008 2008, 10/01/2012, 10/30/2012   Cataract --  --    COPD --  --    Deafness --  HEARING AID   Diabetes --  --    GERD --  --     Heart Murmur --  --    Hemorrhoid --  --    Hernia --  --    Night sweats --  --          Past Surgical History  Procedure Name Date Notes   Appendectomy --  --    Bowel Surgery 01/27/2020 CLOSED LOOP BOWEL OBSTRUCTION REMOVED   Breast 1970 --    Cataract surgery 01/23/2012 01/23/2012: LEFT EYE 03/05/2012: RIGHT EYE   Fasciotomy 09/2004 RIGHT HEEL   Heel Spur 01/2004 --    Hernia 2012 --    Hysterectomy-Abdominal 1981 --    Plantar Fasciitis 11/09/2011 LEFT FOOT   Tonsilectomy 1959 --    Venous ligation 2012 02/22/2012 RIGHT LEG VENOUS CLOSURE 02/29/2012 LEFT LEG VENOUS CLOSURE 03/07/2012 BILATERAL LEG BACK CLOSURE         Medication List  Name Date Started Instructions   Aspir-81 81 mg oral tablet,delayed release (DR/EC)  take 1 tablet (81 mg) by oral route once daily   atorvastatin 10 mg oral tablet 08/18/2020 take 1 tablet (10 mg) by oral route once daily at bedtime   cetirizine 10 mg oral tablet 02/19/2021 take 1 tablet by oral route daily   Contour Next Test Strips miscellaneous strip 02/26/2021 use as directed   diclofenac sodium 1 % topical gel 11/18/2020 APPLY 4 GRAMS EXTERNALLY TO THE AFFECTED AREA FOUR TIMES DAILY AS NEEDED   esomeprazole magnesium 40 mg oral capsule,delayed release(DR/EC) 11/18/2020 take 1 capsule by oral route 2 times a day   estradiol 0.5 mg oral tablet 06/23/2020 take 0.5 tablet by oral route daily   Jardiance 25 mg oral tablet 03/05/2021 take 1 tablet (25 mg) by oral route once daily in the morning for 30 days   lidocaine 5 % topical cream 08/18/2020 apply to affected area(s) by topical route 2 times a day   lisinopril 10 mg oral tablet  take 1 tablet (10 mg) by oral route once daily   magnesium 200 mg oral tablet  take 2 tablets by oral route daily   melatonin 10 mg oral capsule  --    metformin 1,000 mg oral tablet 11/18/2020 take 1 tablet by oral route 2 times a day for 90 days   ONE-DAILY MULTI-VITAMIN TAB  --    Synthroid 25 mcg oral tablet 07/10/2020 take 1 tablet (25 mcg) by  oral route once daily on an empty stomach 60 minutes before breakfast   True Metrix Glucose Test Strip miscellaneous strip 02/19/2021 use as directed   venlafaxine 75 mg oral tablet 08/18/2020 take 1 tablet by oral route daily   Vitamin D-3 with Aloe 120-1,000-10 mg-unit-mg oral tablet  take 1 tablet by oral route daily         Allergy List  Allergen Name Date Reaction Notes   Latex Exam Gloves --  --  --    PENICILLINS --  --  --          Family Medical History  Disease Name Relative/Age Notes   Stroke Father/   --    Heart Disease Mother/   --    Diabetes, unspecified type Brother/  Father/   Father; Brother   Mitral valve prolapse Mother/   --          Social History  Finding Status Start/Stop Quantity Notes   Tobacco Never --/-- --  --          Physical Examination                    Assessment  · Diabetes mellitus, type 2     250.00/E11.9  Continue metformin, Jardiance. Discussed options for management, including DPP4 and GLP1. Patient would prefer to avoid injectable medication if possible, will trial Januvia. She will call clinic if concerns with cost arise. Patient to continue to monitor blood glucose levels, will call with consistent elevations/lows. Will follow up in one month to review blood glucose log and determine if medication adjustments are warranted. Patient to call sooner with concerns. Will continue to monitor.     Problems Reconciled  Plan  · Orders  o Physician Telephone Evaluation, 11-20 minutes ([id number]) - - 03/19/2021  · Medications  o Januvia 25 mg oral tablet   SIG: take 1 tablet (25 mg) by oral route once daily   DISP: (30) Tablet with 1 refills  Prescribed on 03/19/2021     o Medications have been Reconciled  o Transition of Care or Provider Policy  · Instructions  o Chronic conditions reviewed and taken into consideration for today's treatment plan.  o Take all medications as prescribed/directed.  o Call the office with any concerns or questions.  · Disposition  o Follow up in 1  month  o Prescriptions sent to pharmacy            Electronically Signed by: KAT Willingham -Author on March 19, 2021 02:35:52 PM

## 2021-05-14 NOTE — PROGRESS NOTES
Progress Note      Patient Name: Griselda Henderson   Patient ID: [id number]   Sex: Female   YOB: 1940    Primary Care Provider: Dory STEPHENS   Referring Provider: Dory STEPHENS    Visit Date: April 6, 2021    Provider: KAT Willingham   Location: [facility]   Location Address: [address] [place] [address]  [id number]   Location Phone: [phone]          Chief Complaint  · Annual Wellness Exam      History Of Present Illness  The patient is a 80 year old /White female who has come to this office for her Annual Wellness Visit.   Her Primary Care Provider is Dory STEPHENS. Her comprehensive Care Team list, including suppliers, has been updated on the Facesheet. Her medical/family history, height, weight, BMI, and blood pressure have been reviewed and are in the chart. The Health Risk Assessment has been completed and scanned in the chart.   Medications are listed in the medication list.   The active problem list includes: Allergic rhinitis, chronic, Deafness, Diabetes, GERD, Heart Murmur, and Night sweats   The patient does not have a history of substance use.   Patient reports her diet is adequate.   The Mini-Cog has been administered and is scanned in chart. The results are negative. Her cognitive function is without limitation.   A hearing loss screen was completed today and the result is negative.   Patient does not have any risk factors for depression. Patient completed the PHQ-9 today and it has been scanned in the chart. The total score is 1-4.   The Timed Up and Go screen was administered today and the result is negative.   The Wilson Index of [place] in ADLs indicated full function (score of 6).   A Falls Risk Assessment has been completed, including a review of home fall hazards and medication review.   Overall, the patient's functional ability is noted by this provider to be within normal limits. Her level of safety is noted  to be within normal limits. Her balance/gait is within normal limits. There have been no falls in the past year. Patient-specific home safety recommendations have been reviewed and a copy has been given to patient.   She denies issues with leaking urine.   There are no additional risk factors identified.   Living Will/Advanced Directive previously executed but not in chart.   Personalized health advice was given to the patient and a written health screening schedule was established; see Plan for details.      Mammogram scheduled 4/2021.          Past Medical History  Disease Name Date Onset Notes   Allergic rhinitis, chronic --  --    Basal Cell Carcinoma 2008 2008, 10/01/2012, 10/30/2012   Cataract --  --    COPD --  --    Deafness --  HEARING AID   Diabetes --  --    GERD --  --    Heart Murmur --  --    Hemorrhoid --  --    Hernia --  --    Night sweats --  --          Past Surgical History  Procedure Name Date Notes   Appendectomy --  --    Bowel Surgery 01/27/2020 CLOSED LOOP BOWEL OBSTRUCTION REMOVED   Breast 1970 --    Cataract surgery 01/23/2012 01/23/2012: LEFT EYE 03/05/2012: RIGHT EYE   Fasciotomy 09/2004 RIGHT HEEL   Heel Spur 01/2004 --    Hernia 2012 --    Hysterectomy-Abdominal 1981 --    Plantar Fasciitis 11/09/2011 LEFT FOOT   Tonsilectomy 1959 --    Venous ligation 2012 02/22/2012 RIGHT LEG VENOUS CLOSURE 02/29/2012 LEFT LEG VENOUS CLOSURE 03/07/2012 BILATERAL LEG BACK CLOSURE         Medication List  Name Date Started Instructions   Aspir-81 81 mg oral tablet,delayed release (DR/EC)  take 1 tablet (81 mg) by oral route once daily   atorvastatin 10 mg oral tablet 08/18/2020 take 1 tablet (10 mg) by oral route once daily at bedtime   cetirizine 10 mg oral tablet 02/19/2021 take 1 tablet by oral route daily   Contour Next Test Strips miscellaneous strip 02/26/2021 use as directed   diclofenac sodium 1 % topical gel 11/18/2020 APPLY 4 GRAMS EXTERNALLY TO THE AFFECTED AREA FOUR TIMES DAILY AS NEEDED  "  esomeprazole magnesium 40 mg oral capsule,delayed release(DR/EC) 11/18/2020 take 1 capsule by oral route 2 times a day   estradiol 0.5 mg oral tablet 06/23/2020 take 0.5 tablet by oral route daily   Januvia 25 mg oral tablet 03/19/2021 take 1 tablet (25 mg) by oral route once daily   Jardiance 25 mg oral tablet 03/05/2021 take 1 tablet (25 mg) by oral route once daily in the morning for 30 days   lidocaine 5 % topical cream 08/18/2020 apply to affected area(s) by topical route 2 times a day   lisinopril 10 mg oral tablet  take 1 tablet (10 mg) by oral route once daily   magnesium 200 mg oral tablet  take 2 tablets by oral route daily   melatonin 10 mg oral capsule  --    metformin 1,000 mg oral tablet 11/18/2020 take 1 tablet by oral route 2 times a day for 90 days   ONE-DAILY MULTI-VITAMIN TAB  --    Synthroid 25 mcg oral tablet 07/10/2020 take 1 tablet (25 mcg) by oral route once daily on an empty stomach 60 minutes before breakfast   True Metrix Glucose Test Strip miscellaneous strip 02/19/2021 use as directed   venlafaxine 75 mg oral tablet 08/18/2020 take 1 tablet by oral route daily   Vitamin D-3 with Aloe 120-1,000-10 mg-unit-mg oral tablet  take 1 tablet by oral route daily         Allergy List  Allergen Name Date Reaction Notes   Latex Exam Gloves --  --  --    PENICILLINS --  --  --        Allergies Reconciled  Family Medical History  Disease Name Relative/Age Notes   Stroke Father/   --    Heart Disease Mother/   --    Diabetes, unspecified type Brother/  Father/   Father; Brother   Mitral valve prolapse Mother/   --          Social History  Finding Status Start/Stop Quantity Notes   Tobacco Never --/-- --  --          Vitals  Date Time BP Position Site L\R Cuff Size HR RR TEMP (F) WT  HT  BMI kg/m2 BSA m2 O2 Sat FR L/min FiO2 HC       11/18/2020 03:57 /70 Sitting    92 - R  97.2 194lbs 2oz 5'  3\" 34.39 1.98 97 %  21%    02/19/2021 01:15 /74 Sitting    94 - R  98.3 185lbs 2oz 5'  3\" 32.79 " "1.93 99 %  21%    04/06/2021 01:47 /78 Sitting    86 - R  97.8 184lbs 0oz 5'  3\" 32.59 1.93 97 %  21%          Physical Examination  · Head and Face  o Head  o :   § Inspection  § : atraumatic, normocephalic  · Respiratory  o Respiratory Effort  o : breathing comfortably, symmetric chest rise  o Auscultation of Lungs  o : clear to asculatation bilaterally, no wheezes, rales, or rhonchii  · Cardiovascular  o Heart  o :   § Auscultation of Heart  § : regular rate and rhythm, no murmurs, rubs, or gallops  o Peripheral Vascular System  o :   § Extremities  § : no edema  · Neurologic  o Mental Status Examination  o :   § Orientation  § : grossly oriented to person, place and time  o Gait and Station  o :   § Gait Screening  § : normal gait              Assessment  · Encounter for Medicare annual wellness exam     V70.0/Z00.00  · Screening for depression     V79.0/Z13.89  · Screening for alcoholism     V79.1/Z13.39  · Advanced care planning/counseling discussion     V65.49/Z71.89    Problems Reconciled  Plan  · Orders  o Falls Risk Assessment Completed (3288F) - V70.0/Z00.00 - 04/06/2021  o Brief hearing screening (written) [facility] () - V70.0/Z00.00 - 04/06/2021  o Annual Wellness Visit-includes a Personalized Prevention Plan of Service (PPS), SUBSEQUENT VISIT (Medicare) () - V70.0/Z00.00 - 04/06/2021  o Presence or absence of urinary incontinence assessed (TYLER) (1090F) - V70.0/Z00.00 - 04/06/2021  o Negative alcohol screening () - V79.1/Z13.39 - 04/06/2021  o Influenza immunization was ordered or administered () - - 04/06/2021  o ACO-15: Pneumococcal Vaccine Administered or Previously Received (4040F) - - 04/06/2021  o ACO-39: Current medications updated and reviewed (, 1159F) - - 04/06/2021  o ACO-18: Negative screen for clinical depression using a standardized tool () - - 04/06/2021  o ACO-13: Fall Risk Screening with no falls in past year or only one fall without injury in the past year " (1101F) - - 04/06/2021  o ACO - Pt declines to or was not able to provide an Advance Care Plan or name a Surrogate Decision Maker (1124F) - - 04/06/2021  · Medications  o LEVOTHYROXINE 0.025MG (25MCG) TAB   SIG: TAKE 1 TABLET(25 MCG) BY MOUTH EVERY DAY 60 MINUTES BEFORE BREAKFAST ON AN EMPTY STOMACH   DISP: (90) Tablet with -1 refills  Discontinued on 04/06/2021     o Medications have been Reconciled  o Transition of Care or Provider Policy  · Instructions  o Health Risk Assessment has been reviewed with the patient.  o Written health screening schedule for next 5-10 years was established with patient; information scanned in chart and given/mailed to patient.  o Fall prevention methods discussed and a copy of recommendations given/mailed to patient.  o Today's PHQ-9 score is: 1  o Patient was educated/instructed on their diagnosis, treatment and medications prior to discharge from the clinic today.            Electronically Signed by: KAT Willingham -Author on April 12, 2021 08:04:31 AM

## 2021-05-14 NOTE — PROGRESS NOTES
Progress Note      Patient Name: Griselda Henderson   Patient ID: [id number]   Sex: Female   YOB: 1940    Primary Care Provider: Dory STEPHENS   Referring Provider: Dory STEPHENS    Visit Date: April 27, 2021    Provider: KAT Willingham   Location: [facility]   Location Address: [address] [place] [address]  [place], [place]  [id number]   Location Phone: [phone]          Chief Complaint  · Phone visit   · Blood glucose levels       History Of Present Illness  TELEHEALTH TELEPHONE VISIT  Griselda Henderson is a 80 year old /White female who is presenting for evaluation via telehealth telephone visit. Verbal consent obtained before beginning visit.   Provider spent 8 minutes with the patient during the telehealth visit.   The following staff were present during this visit: Franca Brown MA; KAT Willingham   Past Medical History/ Overview of Patient Symptoms     Informed patient that as visit is being performed as a Telehealth visit there will be no opportunity to obtain vital signs or perform a physical exam. Due to this there is unfortunately a possibility that things may be missed that would typically be noticed during a traditional visit. Patient is aware of this possibility and agrees to proceed with Telehealth visit. Patient states there is no other person present for this phone call.     Phone call started: 1357  Phone call ended: 1405    DM2-  Patient currently managed with Jardiance, metformin. Januvia started around one month ago due to elevated HgbA1c of 8.2. Patient reports fasting home blood glucose readings 130s-140s, continues to improve. Denies lows. Patient states she is also walking more to help improve weight now that the weather is getting nicer. She would prefer to continue on current dosages and repeat labs in three months before further adjustments are made.       Past Medical History  Disease Name Date Onset Notes   Allergic  rhinitis, chronic --  --    Basal Cell Carcinoma 2008 2008, 10/01/2012, 10/30/2012   Cataract --  --    COPD --  --    Deafness --  HEARING AID   Diabetes --  --    GERD --  --    Heart Murmur --  --    Hemorrhoid --  --    Hernia --  --    Night sweats --  --          Past Surgical History  Procedure Name Date Notes   Appendectomy --  --    Bowel Surgery 01/27/2020 CLOSED LOOP BOWEL OBSTRUCTION REMOVED   Breast 1970 --    Cataract surgery 01/23/2012 01/23/2012: LEFT EYE 03/05/2012: RIGHT EYE   Fasciotomy 09/2004 RIGHT HEEL   Heel Spur 01/2004 --    Hernia 2012 --    Hysterectomy-Abdominal 1981 --    Plantar Fasciitis 11/09/2011 LEFT FOOT   Tonsilectomy 1959 --    Venous ligation 2012 02/22/2012 RIGHT LEG VENOUS CLOSURE 02/29/2012 LEFT LEG VENOUS CLOSURE 03/07/2012 BILATERAL LEG BACK CLOSURE         Medication List  Name Date Started Instructions   Aspir-81 81 mg oral tablet,delayed release (DR/EC)  take 1 tablet (81 mg) by oral route once daily   atorvastatin 10 mg oral tablet 08/18/2020 take 1 tablet (10 mg) by oral route once daily at bedtime   cetirizine 10 mg oral tablet 02/19/2021 take 1 tablet by oral route daily   Contour Next Test Strips miscellaneous strip 02/26/2021 use as directed   diclofenac sodium 1 % topical gel 11/18/2020 APPLY 4 GRAMS EXTERNALLY TO THE AFFECTED AREA FOUR TIMES DAILY AS NEEDED   esomeprazole magnesium 40 mg oral capsule,delayed release(DR/EC) 11/18/2020 take 1 capsule by oral route 2 times a day   estradiol 0.5 mg oral tablet 06/23/2020 take 0.5 tablet by oral route daily   Januvia 25 mg oral tablet 03/19/2021 take 1 tablet (25 mg) by oral route once daily   Jardiance 25 mg oral tablet 03/05/2021 take 1 tablet (25 mg) by oral route once daily in the morning for 30 days   lidocaine 5 % topical cream 08/18/2020 apply to affected area(s) by topical route 2 times a day   lisinopril 10 mg oral tablet  take 1 tablet (10 mg) by oral route once daily   magnesium 200 mg oral tablet  take 2  tablets by oral route daily   melatonin 10 mg oral capsule  --    metformin 1,000 mg oral tablet 11/18/2020 take 1 tablet by oral route 2 times a day for 90 days   ONE-DAILY MULTI-VITAMIN TAB  --    Synthroid 25 mcg oral tablet 07/10/2020 take 1 tablet (25 mcg) by oral route once daily on an empty stomach 60 minutes before breakfast   True Metrix Glucose Test Strip miscellaneous strip 02/19/2021 use as directed   venlafaxine 75 mg oral tablet 08/18/2020 take 1 tablet by oral route daily   Vitamin D-3 with Aloe 120-1,000-10 mg-unit-mg oral tablet  take 1 tablet by oral route daily         Allergy List  Allergen Name Date Reaction Notes   Latex Exam Gloves --  --  --    PENICILLINS --  --  --          Family Medical History  Disease Name Relative/Age Notes   Stroke Father/   --    Heart Disease Mother/   --    Diabetes, unspecified type Brother/  Father/   Father; Brother   Mitral valve prolapse Mother/   --          Social History  Finding Status Start/Stop Quantity Notes   Tobacco Never --/-- --  --              Assessment  · Diabetes mellitus, type 2     250.00/E11.9  Continue Jardiance, Januvia and metformin. Patient to continue to monitor blood glucose levels and will call with consistent elevations. Will repeat labs at follow up visit and determine if medication adjustments are warranted based on results of HgbA1c. Would consider increasing Januvia at that time. Patient to call or return to clinic with concerns. Will continue to monitor.     Problems Reconciled  Plan  · Orders  o Physican Telephone evaluation, 5-10 min (10837) - - 04/27/2021  · Medications  o Medications have been Reconciled  o Transition of Care or Provider Policy  · Instructions  o Chronic conditions reviewed and taken into consideration for today's treatment plan.  o Take all medications as prescribed/directed.  o Call the office with any concerns or questions.  · Disposition  o Call or Return if symptoms worsen or persist.  o Return Visit  Request in/on 3 months +/- 2 days (24014).            Electronically Signed by: KAT Willingham -Author on April 27, 2021 02:33:04 PM

## 2021-05-15 VITALS
SYSTOLIC BLOOD PRESSURE: 146 MMHG | BODY MASS INDEX: 32.78 KG/M2 | HEIGHT: 63 IN | TEMPERATURE: 98.5 F | HEART RATE: 102 BPM | WEIGHT: 185 LBS | OXYGEN SATURATION: 98 % | RESPIRATION RATE: 18 BRPM | DIASTOLIC BLOOD PRESSURE: 58 MMHG

## 2021-05-15 VITALS
TEMPERATURE: 98 F | HEIGHT: 63 IN | RESPIRATION RATE: 16 BRPM | HEART RATE: 89 BPM | SYSTOLIC BLOOD PRESSURE: 118 MMHG | BODY MASS INDEX: 33.36 KG/M2 | DIASTOLIC BLOOD PRESSURE: 74 MMHG | OXYGEN SATURATION: 100 % | WEIGHT: 188.25 LBS

## 2021-05-15 VITALS — RESPIRATION RATE: 16 BRPM | WEIGHT: 185 LBS | HEIGHT: 63 IN | BODY MASS INDEX: 32.78 KG/M2

## 2021-06-17 RX ORDER — EMPAGLIFLOZIN 25 MG/1
25 TABLET, FILM COATED ORAL DAILY
COMMUNITY
Start: 2021-05-11 | End: 2021-09-10 | Stop reason: SDUPTHER

## 2021-06-17 NOTE — TELEPHONE ENCOUNTER
Patient last seen 4/27/21. Last fill was 3/5/21. Last labs 2/19/2021.  Dory Montoya   Plan of care reviewed with the patient at the beginning of the shift. Pt is day +9 of auto transplant. Pt is tolerating transplant well. Pt states that nausea is improved with scheduled zofran. Pt reported several loose bowel movements yesterday. None overnight. Specimen collection pending. No mucositis noted. PO intake encouraged. Fall precautions maintained. Pt up with a walker independently. Tremors noted at baseline. Pt remained free from falls and injury this shift. Bed locked in lowest position, side rails up x2, call light within reach. Instructed pt to call for assistance as needed. Pt verbalized understanding. Vitals stable. Pt afebrile overnight. Neutropenic precautions maintained. No acute issues overnight. Will continue to monitor.

## 2021-06-18 RX ORDER — EMPAGLIFLOZIN 25 MG/1
TABLET, FILM COATED ORAL
Qty: 30 TABLET | Refills: 2 | Status: SHIPPED | OUTPATIENT
Start: 2021-06-18 | End: 2021-07-13

## 2021-07-07 RX ORDER — ATORVASTATIN CALCIUM 10 MG/1
10 TABLET, FILM COATED ORAL DAILY
Qty: 90 TABLET | Refills: 1 | Status: SHIPPED | OUTPATIENT
Start: 2021-07-07 | End: 2021-07-14

## 2021-07-07 RX ORDER — ATORVASTATIN CALCIUM 10 MG/1
10 TABLET, FILM COATED ORAL DAILY
COMMUNITY
Start: 2021-06-13 | End: 2021-07-07 | Stop reason: SDUPTHER

## 2021-07-07 NOTE — TELEPHONE ENCOUNTER
Last seen: 4/27/2021  Next appt: 8/24/2021  Requesting 90 day script   Dory Pt    Pharmacy: correct in EPIC

## 2021-07-14 RX ORDER — VENLAFAXINE 75 MG/1
75 TABLET ORAL DAILY
COMMUNITY
Start: 2021-06-14 | End: 2021-09-08

## 2021-07-14 RX ORDER — CETIRIZINE HYDROCHLORIDE 10 MG/1
10 TABLET ORAL DAILY
COMMUNITY
Start: 2021-05-16 | End: 2021-08-13

## 2021-07-14 RX ORDER — LEVOTHYROXINE SODIUM 0.03 MG/1
25 TABLET ORAL
COMMUNITY
Start: 2021-07-06 | End: 2022-04-22

## 2021-07-14 RX ORDER — ESTRADIOL 0.5 MG/1
0.5 TABLET ORAL DAILY
COMMUNITY
Start: 2021-06-13 | End: 2021-09-10 | Stop reason: SDUPTHER

## 2021-07-14 RX ORDER — LISINOPRIL 10 MG/1
20 TABLET ORAL DAILY
COMMUNITY
Start: 2021-06-13 | End: 2022-12-05 | Stop reason: DRUGHIGH

## 2021-07-14 RX ORDER — PANTOPRAZOLE SODIUM 40 MG/1
TABLET, DELAYED RELEASE ORAL
COMMUNITY
End: 2021-08-24

## 2021-07-14 RX ORDER — SITAGLIPTIN 25 MG/1
25 TABLET, FILM COATED ORAL DAILY
COMMUNITY
Start: 2021-06-15 | End: 2021-11-29 | Stop reason: SDUPTHER

## 2021-07-14 RX ORDER — ATORVASTATIN CALCIUM 10 MG/1
10 TABLET, FILM COATED ORAL NIGHTLY
Qty: 90 TABLET | Refills: 1 | Status: SHIPPED | OUTPATIENT
Start: 2021-07-14 | End: 2021-11-29 | Stop reason: SDUPTHER

## 2021-07-21 ENCOUNTER — TELEPHONE (OUTPATIENT)
Dept: INTERNAL MEDICINE | Facility: CLINIC | Age: 81
End: 2021-07-21

## 2021-07-21 NOTE — TELEPHONE ENCOUNTER
Called patient back and let her know that we didn't have any appointments until next week, patient didn't want to wait that long so is going to [facility] to be [facility] infection.

## 2021-07-21 NOTE — TELEPHONE ENCOUNTER
Caller: Griselda Henderson    Relationship: Self    Best call back number: [phone]    What medication are you requesting:   What are your current symptoms: PAIN/SORENESS IN BOTH EARS AT TOUCH.     How long have you been experiencing symptoms: ONE WEEK    Have you had these symptoms before:    [] Yes  [x] No    Have you been treated for these symptoms before:   [] Yes  [x] No    If a prescription is needed, what is your preferred pharmacy and phone number: [id number] - [place], [place] - [address] AT [facility] [address] /[facility] & [place] - [phone]  - [phone] FX     Additional notes: PATIENT BELIEVES SHE HAS AN EARACHE IN BOTH EARS.

## 2021-07-29 PROCEDURE — 84550 ASSAY OF BLOOD/URIC ACID: CPT | Performed by: PHYSICIAN ASSISTANT

## 2021-08-13 RX ORDER — CETIRIZINE HYDROCHLORIDE 10 MG/1
TABLET ORAL
Qty: 90 TABLET | Refills: 1 | Status: SHIPPED | OUTPATIENT
Start: 2021-08-13 | End: 2022-09-02 | Stop reason: SDUPTHER

## 2021-08-24 ENCOUNTER — OFFICE VISIT (OUTPATIENT)
Dept: INTERNAL MEDICINE | Facility: CLINIC | Age: 81
End: 2021-08-24

## 2021-08-24 VITALS
OXYGEN SATURATION: 97 % | SYSTOLIC BLOOD PRESSURE: 140 MMHG | HEIGHT: 63 IN | TEMPERATURE: 97.8 F | DIASTOLIC BLOOD PRESSURE: 82 MMHG | WEIGHT: 180 LBS | HEART RATE: 90 BPM | BODY MASS INDEX: 31.89 KG/M2

## 2021-08-24 DIAGNOSIS — R23.2 HOT FLASHES: ICD-10-CM

## 2021-08-24 DIAGNOSIS — N18.32 STAGE 3B CHRONIC KIDNEY DISEASE (HCC): ICD-10-CM

## 2021-08-24 DIAGNOSIS — E11.65 TYPE 2 DIABETES MELLITUS WITH HYPERGLYCEMIA, WITHOUT LONG-TERM CURRENT USE OF INSULIN (HCC): Primary | ICD-10-CM

## 2021-08-24 DIAGNOSIS — R03.0 ELEVATED BLOOD PRESSURE READING IN OFFICE WITHOUT DIAGNOSIS OF HYPERTENSION: ICD-10-CM

## 2021-08-24 DIAGNOSIS — E03.9 HYPOTHYROIDISM, UNSPECIFIED TYPE: ICD-10-CM

## 2021-08-24 DIAGNOSIS — E87.5 HYPERKALEMIA: ICD-10-CM

## 2021-08-24 DIAGNOSIS — E55.9 VITAMIN D DEFICIENCY: ICD-10-CM

## 2021-08-24 DIAGNOSIS — J30.9 ALLERGIC RHINITIS, UNSPECIFIED SEASONALITY, UNSPECIFIED TRIGGER: ICD-10-CM

## 2021-08-24 DIAGNOSIS — E78.2 MIXED HYPERLIPIDEMIA: ICD-10-CM

## 2021-08-24 LAB
25(OH)D3 SERPL-MCNC: 46.1 NG/ML
ALBUMIN SERPL-MCNC: 4 G/DL (ref 3.5–5.2)
ALBUMIN/GLOB SERPL: 1.3 G/DL
ALP SERPL-CCNC: 64 U/L (ref 39–117)
ALT SERPL W P-5'-P-CCNC: 16 U/L (ref 1–33)
ANION GAP SERPL CALCULATED.3IONS-SCNC: 9.8 MMOL/L (ref 5–15)
AST SERPL-CCNC: 19 U/L (ref 1–32)
BASOPHILS # BLD AUTO: 0.05 10*3/MM3 (ref 0–0.2)
BASOPHILS NFR BLD AUTO: 0.9 % (ref 0–1.5)
BILIRUB SERPL-MCNC: 0.4 MG/DL (ref 0–1.2)
BUN SERPL-MCNC: 21 MG/DL (ref 8–23)
BUN/CREAT SERPL: 16.4 (ref 7–25)
CALCIUM SPEC-SCNC: 9.1 MG/DL (ref 8.6–10.5)
CHLORIDE SERPL-SCNC: 103 MMOL/L (ref 98–107)
CHOLEST SERPL-MCNC: 160 MG/DL (ref 0–200)
CO2 SERPL-SCNC: 26.2 MMOL/L (ref 22–29)
CREAT SERPL-MCNC: 1.28 MG/DL (ref 0.57–1)
CREAT UR-MCNC: 74.7 MG/DL
DEPRECATED RDW RBC AUTO: 42.3 FL (ref 37–54)
EOSINOPHIL # BLD AUTO: 0.34 10*3/MM3 (ref 0–0.4)
EOSINOPHIL NFR BLD AUTO: 6 % (ref 0.3–6.2)
ERYTHROCYTE [DISTWIDTH] IN BLOOD BY AUTOMATED COUNT: 12.9 % (ref 12.3–15.4)
GFR SERPL CREATININE-BSD FRML MDRD: 40 ML/MIN/1.73
GLOBULIN UR ELPH-MCNC: 3 GM/DL
GLUCOSE SERPL-MCNC: 153 MG/DL (ref 65–99)
HBA1C MFR BLD: 7.75 % (ref 4.8–5.6)
HCT VFR BLD AUTO: 44.3 % (ref 34–46.6)
HDLC SERPL-MCNC: 61 MG/DL (ref 40–60)
HGB BLD-MCNC: 13.9 G/DL (ref 12–15.9)
IMM GRANULOCYTES # BLD AUTO: 0.02 10*3/MM3 (ref 0–0.05)
IMM GRANULOCYTES NFR BLD AUTO: 0.4 % (ref 0–0.5)
LDLC SERPL CALC-MCNC: 72 MG/DL (ref 0–100)
LDLC/HDLC SERPL: 1.1 {RATIO}
LYMPHOCYTES # BLD AUTO: 1.28 10*3/MM3 (ref 0.7–3.1)
LYMPHOCYTES NFR BLD AUTO: 22.6 % (ref 19.6–45.3)
MCH RBC QN AUTO: 28.7 PG (ref 26.6–33)
MCHC RBC AUTO-ENTMCNC: 31.4 G/DL (ref 31.5–35.7)
MCV RBC AUTO: 91.3 FL (ref 79–97)
MONOCYTES # BLD AUTO: 0.43 10*3/MM3 (ref 0.1–0.9)
MONOCYTES NFR BLD AUTO: 7.6 % (ref 5–12)
NEUTROPHILS NFR BLD AUTO: 3.54 10*3/MM3 (ref 1.7–7)
NEUTROPHILS NFR BLD AUTO: 62.5 % (ref 42.7–76)
NRBC BLD AUTO-RTO: 0 /100 WBC (ref 0–0.2)
PLATELET # BLD AUTO: 217 10*3/MM3 (ref 140–450)
PMV BLD AUTO: 11.8 FL (ref 6–12)
POTASSIUM SERPL-SCNC: 5.3 MMOL/L (ref 3.5–5.2)
PROT SERPL-MCNC: 7 G/DL (ref 6–8.5)
PROT UR-MCNC: 7.4 MG/DL
PROT/CREAT UR: 0.1 MG/G{CREAT}
RBC # BLD AUTO: 4.85 10*6/MM3 (ref 3.77–5.28)
SODIUM SERPL-SCNC: 139 MMOL/L (ref 136–145)
T-UPTAKE NFR SERPL: 1.06 TBI (ref 0.8–1.3)
T4 SERPL-MCNC: 6.58 MCG/DL (ref 4.5–11.7)
TRIGL SERPL-MCNC: 159 MG/DL (ref 0–150)
TSH SERPL DL<=0.05 MIU/L-ACNC: 2.88 UIU/ML (ref 0.27–4.2)
VLDLC SERPL-MCNC: 27 MG/DL (ref 5–40)
WBC # BLD AUTO: 5.66 10*3/MM3 (ref 3.4–10.8)

## 2021-08-24 PROCEDURE — 82306 VITAMIN D 25 HYDROXY: CPT | Performed by: NURSE PRACTITIONER

## 2021-08-24 PROCEDURE — 99214 OFFICE O/P EST MOD 30 MIN: CPT | Performed by: NURSE PRACTITIONER

## 2021-08-24 PROCEDURE — 84436 ASSAY OF TOTAL THYROXINE: CPT | Performed by: NURSE PRACTITIONER

## 2021-08-24 PROCEDURE — 84156 ASSAY OF PROTEIN URINE: CPT | Performed by: NURSE PRACTITIONER

## 2021-08-24 PROCEDURE — 80061 LIPID PANEL: CPT | Performed by: NURSE PRACTITIONER

## 2021-08-24 PROCEDURE — 83036 HEMOGLOBIN GLYCOSYLATED A1C: CPT | Performed by: NURSE PRACTITIONER

## 2021-08-24 PROCEDURE — 85025 COMPLETE CBC W/AUTO DIFF WBC: CPT | Performed by: NURSE PRACTITIONER

## 2021-08-24 PROCEDURE — 82570 ASSAY OF URINE CREATININE: CPT | Performed by: NURSE PRACTITIONER

## 2021-08-24 PROCEDURE — 84479 ASSAY OF THYROID (T3 OR T4): CPT | Performed by: NURSE PRACTITIONER

## 2021-08-24 PROCEDURE — 80053 COMPREHEN METABOLIC PANEL: CPT | Performed by: NURSE PRACTITIONER

## 2021-08-24 PROCEDURE — 36415 COLL VENOUS BLD VENIPUNCTURE: CPT | Performed by: NURSE PRACTITIONER

## 2021-08-24 PROCEDURE — 84443 ASSAY THYROID STIM HORMONE: CPT | Performed by: NURSE PRACTITIONER

## 2021-08-24 RX ORDER — CHOLECALCIFEROL (VITAMIN D3) 50 MCG
1 TABLET ORAL DAILY
COMMUNITY

## 2021-08-24 RX ORDER — MELOXICAM 15 MG/1
15 TABLET ORAL DAILY PRN
COMMUNITY
End: 2021-11-29

## 2021-08-24 RX ORDER — GABAPENTIN 100 MG/1
100 CAPSULE ORAL 3 TIMES DAILY
COMMUNITY
End: 2022-03-28

## 2021-08-24 RX ORDER — CHLORAL HYDRATE 500 MG
1 CAPSULE ORAL DAILY
COMMUNITY
End: 2021-08-24

## 2021-08-24 RX ORDER — DIPHENHYDRAMINE HCL 25 MG
25 CAPSULE ORAL EVERY 6 HOURS PRN
COMMUNITY
End: 2022-09-02

## 2021-08-24 NOTE — ASSESSMENT & PLAN NOTE
Mild elevation in clinic today, continue lisinopril.  Routine labs in clinic today to include CBC and CMP.  Will continue to monitor.  Could consider increasing lisinopril dosage in the future if warranted.

## 2021-08-24 NOTE — ASSESSMENT & PLAN NOTE
Continue Synthroid, thyroid profile in clinic today.  Will determine if medication adjustments are warranted based on results of labs.

## 2021-08-24 NOTE — ASSESSMENT & PLAN NOTE
Well controlled, continue Metformin, Januvia, Jardiance.  Gabapentin for diabetic neuropathy.  Most recent HgbA1c 8.2. Encouraged patient to continue to monitor blood glucose levels and to call with consistent elevations. Repeat labs in clinic today, will determine if medication adjustments are warranted based on results of labs. Diabetic eye exam: 11/2020. Diabetic foot exam: 2/2021. Urine microalbumin: 2/2021. Renal protection: ACE. Pneumonia vaccination: Up to date.

## 2021-08-24 NOTE — PROGRESS NOTES
"Chief Complaint  Follow-up (no concerns ) and Diabetes    Subjective          Griselda Henderson presents to [facility]  HLD-  Managed with statin.  Well tolerated, denies leg cramping.  Most recent LDL 33.    DM2-  Currently managed with Januvia, Jardiance, Metformin.  Reports home blood glucose readings 130s-160s.  Denies lows.  Most recent A1c 8.2.  Gabapentin for diabetic neuropathy. Diabetic eye exam: 11/2020. Diabetic foot exam: 2/2021. Urine microalbumin: 2/2021. Renal protection: ACE. Pneumonia vaccination: Up to date.    Hot flashes-  Currently managed with Effexor and estradiol.  Patient report symptoms are well controlled.  She is aware of risk associated with oral hormone replacement.  She is not interested in discontinuing medication at this time.     CKD-  Most recent GFR 34. Follows up with nephrology 10/2021, has lab order she would like completed.    Hypothyroid-  Managed with Synthroid, well controlled on previous labs.    Elevated blood pressure-  Mild elevation in clinic today.  Patient has never been diagnosed with hypertension per patient report.  She is currently managed with lisinopril due to diabetes. Patient does not check blood pressure at home.  Denies chest pain, blurry vision, headache, leg swelling.    Vitamin D deficiency-  Managed with oral supplement.    Allergic rhinitis-  Managed with Zyrtec.  Well controlled per patient.    Shingles vaccination: Up to date  COVID-19 vaccination: Up to date        Objective   Vital Signs:   /82   Pulse 90   Temp 97.8 °F (36.6 °C)   Ht 160 cm (63\")   Wt 81.6 kg (180 lb)   SpO2 97%   BMI 31.89 kg/m²     Physical Exam  Constitutional:       Appearance: Normal appearance. She is normal weight.   HENT:      Head: Normocephalic and atraumatic.      Nose: Nose normal.      Mouth/Throat:      Mouth: Mucous membranes are moist.      Pharynx: Oropharynx is clear.   Eyes:      Extraocular Movements: " Extraocular movements intact.      Conjunctiva/sclera: Conjunctivae normal.      Pupils: Pupils are equal, round, and reactive to light.   Neck:      Thyroid: No thyroid mass, thyromegaly or thyroid tenderness.   Cardiovascular:      Rate and Rhythm: Normal rate and regular rhythm.      Heart sounds: Normal heart sounds.   Pulmonary:      Effort: Pulmonary effort is normal.      Breath sounds: Normal breath sounds.   Skin:     General: Skin is warm and dry.   Neurological:      General: No focal deficit present.      Mental Status: She is alert and oriented to person, place, and time.   Psychiatric:         Mood and Affect: Mood normal.         Behavior: Behavior normal.         Thought Content: Thought content normal.        Result Review :                 Assessment and Plan    Diagnoses and all orders for this visit:    1. Type 2 diabetes mellitus with hyperglycemia, without long-term current use of insulin (CMS/[facility]) (Primary)  Assessment & Plan:  Well controlled, continue Metformin, Januvia, Jardiance.  Gabapentin for diabetic neuropathy.  Most recent HgbA1c 8.2. Encouraged patient to continue to monitor blood glucose levels and to call with consistent elevations. Repeat labs in clinic today, will determine if medication adjustments are warranted based on results of labs. Diabetic eye exam: 11/2020. Diabetic foot exam: 2/2021. Urine microalbumin: 2/2021. Renal protection: ACE. Pneumonia vaccination: Up to date.          Orders:  -     CBC & Differential  -     Comprehensive Metabolic Panel  -     Hemoglobin A1c  -     Lipid Panel    2. Vitamin D deficiency  Assessment & Plan:  Continue supplement, vitamin D level in clinic today.    Orders:  -     Vitamin D 25 Hydroxy    3. Mixed hyperlipidemia  Assessment & Plan:  Well controlled, continue statin. Most recent LDL 33. Lipid panel with labs.      Orders:  -     Lipid Panel    4. Allergic rhinitis, unspecified seasonality, unspecified trigger  Assessment &  Plan:  Well controlled.  Continue Zyrtec.      5. Stage 3b chronic kidney disease (CMS/HCC)  Assessment & Plan:  Continue to follow with nephrology as scheduled.  Most recent GFR 34.  Labs ordered per nephrology lab request.    Orders:  -     Comprehensive Metabolic Panel  -     Protein / Creatinine Ratio, Urine - Urine, Clean Catch  -     Urinalysis With Microscopic - Urine, Clean Catch    6. Hypothyroidism, unspecified type  Assessment & Plan:  Continue Synthroid, thyroid profile in clinic today.  Will determine if medication adjustments are warranted based on results of labs.    Orders:  -     Thyroid Panel With TSH    7. Elevated blood pressure reading in office without diagnosis of hypertension  Assessment & Plan:  Mild elevation in clinic today, continue lisinopril.  Routine labs in clinic today to include CBC and CMP.  Will continue to monitor.  Could consider increasing lisinopril dosage in the future if warranted.      8. Hot flashes  Assessment & Plan:  Will continue Effexor and estradiol.  Patient aware of risk associated with long-term hormone replacement therapy.  She is not interested in cessation at this time.  Will continue to monitor.        Follow Up   Return in about 3 months (around 11/24/2021).  Patient was given instructions and counseling regarding her condition or for health maintenance advice. Please see specific information pulled into the AVS if appropriate.

## 2021-08-24 NOTE — ASSESSMENT & PLAN NOTE
Continue to follow with nephrology as scheduled.  Most recent GFR 34.  Labs ordered per nephrology lab request.

## 2021-08-24 NOTE — ASSESSMENT & PLAN NOTE
Will continue Effexor and estradiol.  Patient aware of risk associated with long-term hormone replacement therapy.  She is not interested in cessation at this time.  Will continue to monitor.

## 2021-08-26 ENCOUNTER — TELEPHONE (OUTPATIENT)
Dept: INTERNAL MEDICINE | Facility: CLINIC | Age: 81
End: 2021-08-26

## 2021-08-26 NOTE — TELEPHONE ENCOUNTER
Caller: Griselda Henderson    Relationship: Self    Best call back number:[phone]    What is the best time to reach you: ANY     Who are you requesting to speak with (clinical staff, provider,  specific staff member): CLINICAL     Do you know the name of the person who called: MISHEL     What was the call regarding: MISSED CALL FROM THE OFFICE     Do you require a callback: YES    PLEASE ADVISE PATIENT. THANKS.       HUB WAS UNABLE TO WARM TRANSFER TO THE CLINICAL NUMBER.

## 2021-09-07 ENCOUNTER — CLINICAL SUPPORT (OUTPATIENT)
Dept: INTERNAL MEDICINE | Facility: CLINIC | Age: 81
End: 2021-09-07

## 2021-09-07 DIAGNOSIS — E11.65 TYPE 2 DIABETES MELLITUS WITH HYPERGLYCEMIA, WITHOUT LONG-TERM CURRENT USE OF INSULIN (HCC): ICD-10-CM

## 2021-09-07 DIAGNOSIS — E78.2 MIXED HYPERLIPIDEMIA: ICD-10-CM

## 2021-09-07 LAB
ANION GAP SERPL CALCULATED.3IONS-SCNC: 9.7 MMOL/L (ref 5–15)
BUN SERPL-MCNC: 28 MG/DL (ref 8–23)
BUN/CREAT SERPL: 20.4 (ref 7–25)
CALCIUM SPEC-SCNC: 9.8 MG/DL (ref 8.6–10.5)
CHLORIDE SERPL-SCNC: 102 MMOL/L (ref 98–107)
CO2 SERPL-SCNC: 25.3 MMOL/L (ref 22–29)
CREAT SERPL-MCNC: 1.37 MG/DL (ref 0.57–1)
GFR SERPL CREATININE-BSD FRML MDRD: 37 ML/MIN/1.73
GLUCOSE SERPL-MCNC: 174 MG/DL (ref 65–99)
POTASSIUM SERPL-SCNC: 5.4 MMOL/L (ref 3.5–5.2)
SODIUM SERPL-SCNC: 137 MMOL/L (ref 136–145)

## 2021-09-07 PROCEDURE — 80048 BASIC METABOLIC PNL TOTAL CA: CPT | Performed by: NURSE PRACTITIONER

## 2021-09-07 PROCEDURE — 36415 COLL VENOUS BLD VENIPUNCTURE: CPT | Performed by: NURSE PRACTITIONER

## 2021-09-08 RX ORDER — VENLAFAXINE 75 MG/1
TABLET ORAL
Qty: 90 TABLET | Refills: 0 | Status: SHIPPED | OUTPATIENT
Start: 2021-09-08 | End: 2021-10-05

## 2021-09-10 RX ORDER — EMPAGLIFLOZIN 25 MG/1
25 TABLET, FILM COATED ORAL DAILY
Qty: 30 TABLET | Refills: 2 | Status: SHIPPED | OUTPATIENT
Start: 2021-09-10 | End: 2021-11-29 | Stop reason: SDUPTHER

## 2021-09-10 RX ORDER — ESTRADIOL 0.5 MG/1
0.5 TABLET ORAL DAILY
Qty: 30 TABLET | Refills: 2 | Status: SHIPPED | OUTPATIENT
Start: 2021-09-10 | End: 2021-11-29 | Stop reason: SDUPTHER

## 2021-09-10 NOTE — TELEPHONE ENCOUNTER
Caller: Griselda Henderson    Relationship: Self    Best call back number: [phone]    Medication needed:   Requested Prescriptions     Pending Prescriptions Disp Refills   • estradiol (ESTRACE) 0.5 MG tablet       Sig: Take 1 tablet by mouth Daily.   • Jardiance 25 MG tablet tablet 30 tablet      Sig: Take 1 tablet by mouth Daily.       When do you need the refill by: 09/10/2021      Does the patient have less than a 3 day supply:  [x] Yes  [] No    What is the patient's preferred pharmacy: [facility] #[id number] [facility], [address] AT [facility] OF [facility] [address] /[facility] & [place] - [phone] [facility] [phone] FX

## 2021-09-13 RX ORDER — ESTRADIOL 0.5 MG/1
TABLET ORAL
Qty: 90 TABLET | OUTPATIENT
Start: 2021-09-13

## 2021-09-14 ENCOUNTER — TELEPHONE (OUTPATIENT)
Dept: INTERNAL MEDICINE | Facility: CLINIC | Age: 81
End: 2021-09-14

## 2021-10-02 ENCOUNTER — FLU SHOT (OUTPATIENT)
Dept: INTERNAL MEDICINE | Facility: CLINIC | Age: 81
End: 2021-10-02

## 2021-10-02 DIAGNOSIS — Z23 NEED FOR INFLUENZA VACCINATION: Primary | ICD-10-CM

## 2021-10-02 PROCEDURE — 90686 IIV4 VACC NO PRSV 0.5 ML IM: CPT | Performed by: INTERNAL MEDICINE

## 2021-10-02 PROCEDURE — G0008 ADMIN INFLUENZA VIRUS VAC: HCPCS | Performed by: INTERNAL MEDICINE

## 2021-10-05 RX ORDER — VENLAFAXINE 75 MG/1
TABLET ORAL
Qty: 90 TABLET | Refills: 0 | Status: SHIPPED | OUTPATIENT
Start: 2021-10-05 | End: 2021-11-29 | Stop reason: SDUPTHER

## 2021-11-29 ENCOUNTER — OFFICE VISIT (OUTPATIENT)
Dept: INTERNAL MEDICINE | Facility: CLINIC | Age: 81
End: 2021-11-29

## 2021-11-29 VITALS
BODY MASS INDEX: 32.78 KG/M2 | SYSTOLIC BLOOD PRESSURE: 138 MMHG | DIASTOLIC BLOOD PRESSURE: 80 MMHG | OXYGEN SATURATION: 98 % | TEMPERATURE: 96.1 F | HEIGHT: 63 IN | HEART RATE: 90 BPM | WEIGHT: 185 LBS

## 2021-11-29 DIAGNOSIS — K21.9 GASTROESOPHAGEAL REFLUX DISEASE, UNSPECIFIED WHETHER ESOPHAGITIS PRESENT: ICD-10-CM

## 2021-11-29 DIAGNOSIS — R23.2 HOT FLASHES: ICD-10-CM

## 2021-11-29 DIAGNOSIS — N18.32 STAGE 3B CHRONIC KIDNEY DISEASE (HCC): ICD-10-CM

## 2021-11-29 DIAGNOSIS — I10 ESSENTIAL HYPERTENSION: ICD-10-CM

## 2021-11-29 DIAGNOSIS — E03.9 HYPOTHYROIDISM, UNSPECIFIED TYPE: ICD-10-CM

## 2021-11-29 DIAGNOSIS — H60.503 ACUTE OTITIS EXTERNA OF BOTH EARS, UNSPECIFIED TYPE: ICD-10-CM

## 2021-11-29 DIAGNOSIS — E78.5 HYPERLIPIDEMIA, UNSPECIFIED HYPERLIPIDEMIA TYPE: ICD-10-CM

## 2021-11-29 DIAGNOSIS — E55.9 VITAMIN D DEFICIENCY: ICD-10-CM

## 2021-11-29 DIAGNOSIS — E11.65 TYPE 2 DIABETES MELLITUS WITH HYPERGLYCEMIA, WITHOUT LONG-TERM CURRENT USE OF INSULIN (HCC): Primary | ICD-10-CM

## 2021-11-29 DIAGNOSIS — J30.9 ALLERGIC RHINITIS, UNSPECIFIED SEASONALITY, UNSPECIFIED TRIGGER: ICD-10-CM

## 2021-11-29 PROBLEM — R03.0 ELEVATED BLOOD PRESSURE READING IN OFFICE WITHOUT DIAGNOSIS OF HYPERTENSION: Status: RESOLVED | Noted: 2021-08-24 | Resolved: 2021-11-29

## 2021-11-29 LAB
25(OH)D3 SERPL-MCNC: 44.4 NG/ML
ALBUMIN SERPL-MCNC: 4.3 G/DL (ref 3.5–5.2)
ALBUMIN/GLOB SERPL: 1.6 G/DL
ALP SERPL-CCNC: 72 U/L (ref 39–117)
ALT SERPL W P-5'-P-CCNC: 14 U/L (ref 1–33)
ANION GAP SERPL CALCULATED.3IONS-SCNC: 11.6 MMOL/L (ref 5–15)
AST SERPL-CCNC: 16 U/L (ref 1–32)
BASOPHILS # BLD AUTO: 0.05 10*3/MM3 (ref 0–0.2)
BASOPHILS NFR BLD AUTO: 0.8 % (ref 0–1.5)
BILIRUB SERPL-MCNC: 0.3 MG/DL (ref 0–1.2)
BUN SERPL-MCNC: 23 MG/DL (ref 8–23)
BUN/CREAT SERPL: 19.3 (ref 7–25)
CALCIUM SPEC-SCNC: 9.3 MG/DL (ref 8.6–10.5)
CHLORIDE SERPL-SCNC: 105 MMOL/L (ref 98–107)
CHOLEST SERPL-MCNC: 157 MG/DL (ref 0–200)
CO2 SERPL-SCNC: 23.4 MMOL/L (ref 22–29)
CREAT SERPL-MCNC: 1.19 MG/DL (ref 0.57–1)
DEPRECATED RDW RBC AUTO: 40.2 FL (ref 37–54)
EOSINOPHIL # BLD AUTO: 0.2 10*3/MM3 (ref 0–0.4)
EOSINOPHIL NFR BLD AUTO: 3 % (ref 0.3–6.2)
ERYTHROCYTE [DISTWIDTH] IN BLOOD BY AUTOMATED COUNT: 12.5 % (ref 12.3–15.4)
GFR SERPL CREATININE-BSD FRML MDRD: 44 ML/MIN/1.73
GLOBULIN UR ELPH-MCNC: 2.7 GM/DL
GLUCOSE SERPL-MCNC: 230 MG/DL (ref 65–99)
HBA1C MFR BLD: 8.28 % (ref 4.8–5.6)
HCT VFR BLD AUTO: 42.6 % (ref 34–46.6)
HDLC SERPL-MCNC: 71 MG/DL (ref 40–60)
HGB BLD-MCNC: 14 G/DL (ref 12–15.9)
IMM GRANULOCYTES # BLD AUTO: 0.02 10*3/MM3 (ref 0–0.05)
IMM GRANULOCYTES NFR BLD AUTO: 0.3 % (ref 0–0.5)
LDLC SERPL CALC-MCNC: 59 MG/DL (ref 0–100)
LDLC/HDLC SERPL: 0.75 {RATIO}
LYMPHOCYTES # BLD AUTO: 1.23 10*3/MM3 (ref 0.7–3.1)
LYMPHOCYTES NFR BLD AUTO: 18.8 % (ref 19.6–45.3)
MCH RBC QN AUTO: 29.2 PG (ref 26.6–33)
MCHC RBC AUTO-ENTMCNC: 32.9 G/DL (ref 31.5–35.7)
MCV RBC AUTO: 88.8 FL (ref 79–97)
MONOCYTES # BLD AUTO: 0.5 10*3/MM3 (ref 0.1–0.9)
MONOCYTES NFR BLD AUTO: 7.6 % (ref 5–12)
NEUTROPHILS NFR BLD AUTO: 4.56 10*3/MM3 (ref 1.7–7)
NEUTROPHILS NFR BLD AUTO: 69.5 % (ref 42.7–76)
NRBC BLD AUTO-RTO: 0 /100 WBC (ref 0–0.2)
PLATELET # BLD AUTO: 312 10*3/MM3 (ref 140–450)
PMV BLD AUTO: 10.7 FL (ref 6–12)
POTASSIUM SERPL-SCNC: 4.8 MMOL/L (ref 3.5–5.2)
PROT SERPL-MCNC: 7 G/DL (ref 6–8.5)
RBC # BLD AUTO: 4.8 10*6/MM3 (ref 3.77–5.28)
SODIUM SERPL-SCNC: 140 MMOL/L (ref 136–145)
T3FREE SERPL-MCNC: 2.68 PG/ML (ref 2–4.4)
T4 FREE SERPL-MCNC: 1.01 NG/DL (ref 0.93–1.7)
TRIGL SERPL-MCNC: 164 MG/DL (ref 0–150)
TSH SERPL DL<=0.05 MIU/L-ACNC: 4.24 UIU/ML (ref 0.27–4.2)
VLDLC SERPL-MCNC: 27 MG/DL (ref 5–40)
WBC NRBC COR # BLD: 6.56 10*3/MM3 (ref 3.4–10.8)

## 2021-11-29 PROCEDURE — 84481 FREE ASSAY (FT-3): CPT | Performed by: NURSE PRACTITIONER

## 2021-11-29 PROCEDURE — 82306 VITAMIN D 25 HYDROXY: CPT | Performed by: NURSE PRACTITIONER

## 2021-11-29 PROCEDURE — 80061 LIPID PANEL: CPT | Performed by: NURSE PRACTITIONER

## 2021-11-29 PROCEDURE — 83036 HEMOGLOBIN GLYCOSYLATED A1C: CPT | Performed by: NURSE PRACTITIONER

## 2021-11-29 PROCEDURE — 99214 OFFICE O/P EST MOD 30 MIN: CPT | Performed by: NURSE PRACTITIONER

## 2021-11-29 PROCEDURE — 84443 ASSAY THYROID STIM HORMONE: CPT | Performed by: NURSE PRACTITIONER

## 2021-11-29 PROCEDURE — 85025 COMPLETE CBC W/AUTO DIFF WBC: CPT | Performed by: NURSE PRACTITIONER

## 2021-11-29 PROCEDURE — 36415 COLL VENOUS BLD VENIPUNCTURE: CPT | Performed by: NURSE PRACTITIONER

## 2021-11-29 PROCEDURE — 80053 COMPREHEN METABOLIC PANEL: CPT | Performed by: NURSE PRACTITIONER

## 2021-11-29 PROCEDURE — 84439 ASSAY OF FREE THYROXINE: CPT | Performed by: NURSE PRACTITIONER

## 2021-11-29 RX ORDER — EMPAGLIFLOZIN 25 MG/1
25 TABLET, FILM COATED ORAL DAILY
Qty: 90 TABLET | Refills: 1 | Status: SHIPPED | OUTPATIENT
Start: 2021-11-29

## 2021-11-29 RX ORDER — ATORVASTATIN CALCIUM 10 MG/1
10 TABLET, FILM COATED ORAL NIGHTLY
Qty: 90 TABLET | Refills: 1 | Status: SHIPPED | OUTPATIENT
Start: 2021-11-29 | End: 2022-07-01

## 2021-11-29 RX ORDER — ESTRADIOL 0.5 MG/1
0.5 TABLET ORAL DAILY
Qty: 90 TABLET | Refills: 1 | Status: SHIPPED | OUTPATIENT
Start: 2021-11-29 | End: 2022-06-15

## 2021-11-29 RX ORDER — CIPROFLOXACIN AND DEXAMETHASONE 3; 1 MG/ML; MG/ML
4 SUSPENSION/ DROPS AURICULAR (OTIC) 2 TIMES DAILY
Qty: 7.5 ML | Refills: 0 | Status: SHIPPED | OUTPATIENT
Start: 2021-11-29 | End: 2021-12-06

## 2021-11-29 RX ORDER — SITAGLIPTIN 25 MG/1
25 TABLET, FILM COATED ORAL DAILY
Qty: 90 TABLET | Refills: 1 | Status: SHIPPED | OUTPATIENT
Start: 2021-11-29 | End: 2021-12-01

## 2021-11-29 RX ORDER — VENLAFAXINE 75 MG/1
75 TABLET ORAL DAILY
Qty: 90 TABLET | Refills: 1 | Status: SHIPPED | OUTPATIENT
Start: 2021-11-29 | End: 2022-06-15

## 2021-11-29 NOTE — PROGRESS NOTES
"Chief Complaint  type 2 diabetes mellitus and Earache    Subjective          Griselda Henderson presents to [facility]  GERD-  Well controlled with esomeprazole.    HLD-  Managed with statin.  Well tolerated, denies leg cramping.  Most recent LDL 72.     DM2-  Currently managed with Januvia, Jardiance, Metformin.  Reports home blood glucose readings 160s. States \"I am a sweetaholic\" patient feels that A1c will be elevated today.  Denies lows.  Most recent A1c 7.7.  Gabapentin for diabetic neuropathy. Diabetic eye exam: 11/2020. Diabetic foot exam: 2/2021. Urine microalbumin: 2/2021. Renal protection: ACE. Pneumonia vaccination: Up to date.     Hot flashes-  Currently managed with Effexor and estradiol.  Patient reports recent increase in symptoms, she is aware of risk associated with oral hormone replacement and is not interested in discontinuing medication at this time.      CKD-  Most recent GFR 40. Scheduled with nephrology every 6 months.      Hypothyroid-  Managed with Synthroid, well controlled on previous labs.     HTN-  Previously managed with lisinopril for renal protection for DM2, discontinued by nephrology due to CKD.  Patient states at previous visit she was told by nephrologist that they will likely restart ACE at that time.  Patient states yesterday she had a headache, checked her blood pressure and the systolic was in the 180s. Denies chest pain, blurry vision, leg swelling.     Vitamin D deficiency-  Managed with oral supplement.     Allergic rhinitis-  Managed with Zyrtec. Recently had otitis externa, states she was treated with ear drops and symptoms resolved. Patient states she is noticing that the symptoms are starting back, reports bilateral tragal pain.  Patient wears hearing aids.    Shingles vaccination: Up to date  COVID-19 vaccination: Up to date, has had booster  Influenza vaccination: Up to date      Objective   Vital Signs:   /80 (BP " "Location: Left arm, Patient Position: Sitting, Cuff Size: Adult)   Pulse 90   Temp 96.1 °F (35.6 °C) (Temporal)   Ht 160 cm (63\")   Wt 83.9 kg (185 lb)   SpO2 98%   BMI 32.77 kg/m²     Physical Exam  Constitutional:       Appearance: Normal appearance. She is normal weight.   HENT:      Head: Normocephalic and atraumatic.      Ears:      Comments: Bilateral tragal pain, canal erythematous, edematous, flaky     Nose: Nose normal.      Mouth/Throat:      Mouth: Mucous membranes are moist.      Pharynx: Oropharynx is clear.   Eyes:      Extraocular Movements: Extraocular movements intact.      Conjunctiva/sclera: Conjunctivae normal.      Pupils: Pupils are equal, round, and reactive to light.   Cardiovascular:      Rate and Rhythm: Normal rate and regular rhythm.      Heart sounds: Normal heart sounds.   Pulmonary:      Effort: Pulmonary effort is normal.      Breath sounds: Normal breath sounds.   Skin:     General: Skin is warm and dry.   Neurological:      General: No focal deficit present.      Mental Status: She is alert and oriented to person, place, and time.   Psychiatric:         Mood and Affect: Mood normal.         Behavior: Behavior normal.         Thought Content: Thought content normal.        Result Review :                 Assessment and Plan    Diagnoses and all orders for this visit:    1. Type 2 diabetes mellitus with hyperglycemia, without long-term current use of insulin (HCC) (Primary)  Assessment & Plan:  Continue Januvia, Jardiance, Metformin.  Patient to continue to monitor blood glucose levels and will call with consistent elevations.  Most recent A1c 7.7, repeat in clinic today.  Continue gabapentin for diabetic neuropathy. Diabetic eye exam: 11/2020. Diabetic foot exam: 2/2021. Urine microalbumin: 2/2021. Renal protection: ACE. Pneumonia vaccination: Up to date.      Orders:  -     CBC & Differential  -     Comprehensive Metabolic Panel  -     Hemoglobin A1c  -     Lipid Panel  -     " TSH  -     T3, Free  -     T4, Free  -     Comprehensive metabolic panel; Future    2. Vitamin D deficiency  Assessment & Plan:  Continue vitamin D supplement, vitamin D level with labs.    Orders:  -     Vitamin D 25 Hydroxy    3. Allergic rhinitis, unspecified seasonality, unspecified trigger  Assessment & Plan:  Well-controlled, continue Zyrtec.      4. Hyperlipidemia, unspecified hyperlipidemia type  Assessment & Plan:  Well controlled, continue statin. Most recent LDL 72, goal less than 70 with DM2. Lipid panel with labs.        5. Hypothyroidism, unspecified type  Assessment & Plan:  Continue Synthroid, thyroid profile with labs.      6. Stage 3b chronic kidney disease (HCC)  Assessment & Plan:  Most recent GFR 40, follow-up with nephrology as scheduled.  CMP in clinic today, will monitor closely with initiation of ACE.      7. Hot flashes  Assessment & Plan:  Continue Effexor and estradiol, patient aware of long-term risk associated with oral hormone replacement therapy.  She is not interested in cessation at this time.      8. Essential hypertension  Assessment & Plan:  Patient to restart lisinopril, she will discuss further with nephrology at upcoming appointment.  CMP in clinic today, will repeat in 1 month to monitor electrolytes and renal function.  She will continue to monitor blood pressure and will call with consistent elevations greater than 130/80.    Orders:  -     Comprehensive metabolic panel; Future    9. Acute otitis externa of both ears, unspecified type  Comments:  Ciprodex to pharmacy, patient to call or return to clinic if symptoms worsen or persist.    10. Gastroesophageal reflux disease, unspecified whether esophagitis present  Assessment & Plan:  Well-controlled, continue as esomeprazole.      Other orders  -     metFORMIN (GLUCOPHAGE) 1000 MG tablet; Take 1 tablet by mouth 2 (Two) Times a Day.  Dispense: 180 tablet; Refill: 1  -     Jardiance 25 MG tablet tablet; Take 1 tablet by mouth  Daily.  Dispense: 90 tablet; Refill: 1  -     Januvia 25 MG tablet; Take 1 tablet by mouth Daily.  Dispense: 90 tablet; Refill: 1  -     estradiol (ESTRACE) 0.5 MG tablet; Take 1 tablet by mouth Daily.  Dispense: 90 tablet; Refill: 1  -     atorvastatin (LIPITOR) 10 MG tablet; Take 1 tablet by mouth Every Night.  Dispense: 90 tablet; Refill: 1  -     venlafaxine (EFFEXOR) 75 MG tablet; Take 1 tablet by mouth Daily.  Dispense: 90 tablet; Refill: 1  -     ciprofloxacin-dexamethasone (Ciprodex) 0.3-0.1 % otic suspension; Administer 4 drops into both ears 2 (Two) Times a Day for 7 days.  Dispense: 7.5 mL; Refill: 0      Follow Up   Return in about 3 months (around 2/28/2022).  Patient was given instructions and counseling regarding her condition or for health maintenance advice. Please see specific information pulled into the AVS if appropriate.

## 2021-11-29 NOTE — ASSESSMENT & PLAN NOTE
Continue Effexor and estradiol, patient aware of long-term risk associated with oral hormone replacement therapy.  She is not interested in cessation at this time.

## 2021-11-29 NOTE — ASSESSMENT & PLAN NOTE
EKG obtained    Patient is here today with her daughter Basilio Bosch, and her friend Prem Packer. Patient was referred by Dr. Romana Shelton for Afib. Patient admits to more SOB, Fatigue, chest pressure, palpitations.   Patient denies light headed, dizziness     Patient does have a MDT pacer  01.2019 Most recent GFR 40, follow-up with nephrology as scheduled.  CMP in clinic today, will monitor closely with initiation of ACE.   Smoker    Smokeless tobacco: Never Used   Substance and Sexual Activity    Alcohol use: No    Drug use: No    Sexual activity: Not on file   Lifestyle    Physical activity:     Days per week: Not on file     Minutes per session: Not on file    Stress: Not on file   Relationships    Social connections:     Talks on phone: Not on file     Gets together: Not on file     Attends Worship service: Not on file     Active member of club or organization: Not on file     Attends meetings of clubs or organizations: Not on file     Relationship status: Not on file    Intimate partner violence:     Fear of current or ex partner: Not on file     Emotionally abused: Not on file     Physically abused: Not on file     Forced sexual activity: Not on file   Other Topics Concern    Not on file   Social History Narrative    ** Merged History Encounter **            MEDICATIONS:  Current Outpatient Medications   Medication Sig Dispense Refill    metoprolol succinate (TOPROL XL) 100 MG extended release tablet Take 1 tablet by mouth daily 90 tablet 3    nitroGLYCERIN (NITROSTAT) 0.4 MG SL tablet Place 0.4 mg under the tongue every 5 minutes as needed for Chest pain up to max of 3 total doses. If no relief after 1 dose, call 911.  VOLTAREN 1 % GEL Apply topically 2 times daily Apply to both feet 1 Tube 5    levothyroxine (SYNTHROID) 112 MCG tablet Take 1 tablet by mouth Daily 90 tablet 3    diltiazem (CARDIZEM CD) 360 MG extended release capsule Take 1 capsule by mouth daily 90 capsule 3    apixaban (ELIQUIS) 2.5 MG TABS tablet Take 1 tablet by mouth 2 times daily 180 tablet 3    dofetilide (TIKOSYN) 250 MCG capsule Take 1 capsule by mouth 2 times daily 180 capsule 3    omeprazole (PRILOSEC) 20 MG delayed release capsule Take 1 capsule by mouth daily 90 capsule 3     No current facility-administered medications for this visit. REVIEW OFSYSTEMS:    Review of Systems   Constitution: Positive for malaise/fatigue.

## 2021-11-29 NOTE — ASSESSMENT & PLAN NOTE
Continue Januvia, Jardiance, Metformin.  Patient to continue to monitor blood glucose levels and will call with consistent elevations.  Most recent A1c 7.7, repeat in clinic today.  Continue gabapentin for diabetic neuropathy. Diabetic eye exam: 11/2020. Diabetic foot exam: 2/2021. Urine microalbumin: 2/2021. Renal protection: ACE. Pneumonia vaccination: Up to date.

## 2021-11-29 NOTE — ASSESSMENT & PLAN NOTE
Well controlled, continue statin. Most recent LDL 72, goal less than 70 with DM2. Lipid panel with labs.

## 2021-11-29 NOTE — ASSESSMENT & PLAN NOTE
Patient to restart lisinopril, she will discuss further with nephrology at upcoming appointment.  CMP in clinic today, will repeat in 1 month to monitor electrolytes and renal function.  She will continue to monitor blood pressure and will call with consistent elevations greater than 130/80.

## 2022-01-14 ENCOUNTER — CLINICAL SUPPORT (OUTPATIENT)
Dept: INTERNAL MEDICINE | Facility: CLINIC | Age: 82
End: 2022-01-14

## 2022-01-14 DIAGNOSIS — I10 ESSENTIAL HYPERTENSION: ICD-10-CM

## 2022-01-14 DIAGNOSIS — E87.5 HYPERKALEMIA: ICD-10-CM

## 2022-01-14 DIAGNOSIS — E11.65 TYPE 2 DIABETES MELLITUS WITH HYPERGLYCEMIA, WITHOUT LONG-TERM CURRENT USE OF INSULIN: ICD-10-CM

## 2022-01-14 LAB
ALBUMIN SERPL-MCNC: 4.2 G/DL (ref 3.5–5.2)
ALBUMIN/GLOB SERPL: 1.7 G/DL
ALP SERPL-CCNC: 57 U/L (ref 39–117)
ALT SERPL W P-5'-P-CCNC: 14 U/L (ref 1–33)
ANION GAP SERPL CALCULATED.3IONS-SCNC: 10.7 MMOL/L (ref 5–15)
AST SERPL-CCNC: 18 U/L (ref 1–32)
BILIRUB SERPL-MCNC: 0.4 MG/DL (ref 0–1.2)
BUN SERPL-MCNC: 28 MG/DL (ref 8–23)
BUN/CREAT SERPL: 22 (ref 7–25)
CALCIUM SPEC-SCNC: 9.3 MG/DL (ref 8.6–10.5)
CHLORIDE SERPL-SCNC: 103 MMOL/L (ref 98–107)
CO2 SERPL-SCNC: 24.3 MMOL/L (ref 22–29)
CREAT SERPL-MCNC: 1.27 MG/DL (ref 0.57–1)
GFR SERPL CREATININE-BSD FRML MDRD: 41 ML/MIN/1.73
GLOBULIN UR ELPH-MCNC: 2.5 GM/DL
GLUCOSE SERPL-MCNC: 162 MG/DL (ref 65–99)
POTASSIUM SERPL-SCNC: 5 MMOL/L (ref 3.5–5.2)
PROT SERPL-MCNC: 6.7 G/DL (ref 6–8.5)
SODIUM SERPL-SCNC: 138 MMOL/L (ref 136–145)

## 2022-01-14 PROCEDURE — 36415 COLL VENOUS BLD VENIPUNCTURE: CPT | Performed by: NURSE PRACTITIONER

## 2022-01-14 PROCEDURE — 80053 COMPREHEN METABOLIC PANEL: CPT | Performed by: NURSE PRACTITIONER

## 2022-01-31 ENCOUNTER — PATIENT OUTREACH (OUTPATIENT)
Dept: CASE MANAGEMENT | Facility: OTHER | Age: 82
End: 2022-01-31

## 2022-01-31 NOTE — OUTREACH NOTE
ASSESSMENT    Staff Spoke With: MSW spoke with patient to discuss resources needed for medication costs assistance.    Reason for the Referral: Financial Concerns    Patient's Reported Cognitive Status: Alert and Oriented    Does the patient have Advanced Care Planning documents?: Yes, Living Will and POA    Patient's Reported Physical Status: Independent    Patient utilizes these assistive devices and/or in home care services: None    Patient's Current Living Arrangements (Home Environment, Caregiver Support, Others in Home): Patient lives with spouse.    Patient's [employer] Status: Did not serve in the .    Patient's Employment Status: Patient is retired.    Patient's Spiritual Affiliation/Impact on Care: None noted.    Patient's Behavioral Health/Substance Abuse History: None noted.    SDOH updated and reviewed with the patient during this program:     Financial Resource Strain: Medium Risk   • Difficulty of Paying Living Expenses: Somewhat hard       Food Insecurity: No Food Insecurity   • Worried About Running Out of Food in the Last Year: Never true   • Ran Out of Food in the Last Year: Never true       Transportation Needs: No Transportation Needs   • Lack of Transportation (Medical): No   • Lack of Transportation (Non-Medical): No       Housing Stability: Low Risk    • Unable to Pay for Housing in the Last Year: No   • Number of Places Lived in the Last Year: 1   • Unstable Housing in the Last Year: No      Continuing Care   Community & DME   [facility] [place] [facility]    [address] [place] [address]    Phone: [phone]    Resource for: Financial Resource Strain     Patient Outreach    MSW Spoke with patient via phone to discuss resources needed. Patient states that she is spending around $500 for Januvia and Jardiance is also expensive. MSW provided resources for assistance and will send forms to provider office for signature.     DISCUSSION AND PLAN    MSW  to inform patient when forms are ready for .    Verbal consent obtained to contact/refer to the following individuals and/or agencies: N/A      KUSUM Medina   , Ambulatory Case Management    1/31/2022 13:16 EST

## 2022-02-02 ENCOUNTER — PATIENT OUTREACH (OUTPATIENT)
Dept: CASE MANAGEMENT | Facility: OTHER | Age: 82
End: 2022-02-02

## 2022-02-02 NOTE — OUTREACH NOTE
Patient Outreach    MSW spoke with patient to inform patient that paperwork for Prescription Assistance Programs have been signed and are in the front office ready for . MSW provided contact information if they have any questions with the application process. MSW to follow up.     KUSUM Medina   , Ambulatory Case Management    2/2/2022 10:04 EST

## 2022-02-07 ENCOUNTER — TELEPHONE (OUTPATIENT)
Dept: INTERNAL MEDICINE | Facility: CLINIC | Age: 82
End: 2022-02-07

## 2022-02-07 NOTE — TELEPHONE ENCOUNTER
Caller: Griselda Henderson    Relationship: Self    Best call back number: [phone]     Requested Prescriptions:   Requested Prescriptions     Pending Prescriptions Disp Refills   • SITagliptin (Januvia) 50 MG tablet       Sig: Take 1 tablet by mouth Daily.        Pharmacy where request should be sent: [facility] #[id number] - [place], [place] - [address] [facility] AT [facility] OF [address]/[facility] & [place] - [phone] [facility] [phone] FX     Additional details provided by patient: PATIENT WAS TOLD SHE WOULD GET THIS MEDICATION AT 50 MG TO TAKE 1 TIME A DAY.     WHEN IT WAS REFILLED IT WAS SENT IN FOR 25 MG 1 TIME A DAY INSTEAD OF 2 TIMES A DAY TO EQUAL THE 50 MG.     SHE IS OUT OF MEDICATION NOW AND WOULD LIKE TO HAVE THE 50MG REFILLED.    Does the patient have less than a 3 day supply:  [x] Yes  [] No    Glenis Butler Rep   02/07/22 13:09 EST

## 2022-02-15 NOTE — TELEPHONE ENCOUNTER
Pharmacy Name: [facility] #[id number] - [place], [place] - [address] AT [facility] OF [address] [facility] & [place] - [phone] [facility] [phone] FX     Pharmacy representative name: VICENTE    Pharmacy representative phone number: [phone]    What medication are you calling in regards to:   SITagliptin (Januvia) 50 MG tablet    What question does the pharmacy have: PHARMACY STATED THAT PATIENT IS STATING THAT SHE TAKES THE MEDICATION TWICE DAILY, BUT ON THEIR RECORDS, IT HAS BEEN PRESCRIBED ONCE DAILY. PHARMACY IS NEEDING CLARIFICATION ON THIS MEDICATION.     Who is the provider that prescribed the medication: FRANCY WILLAMS

## 2022-02-16 ENCOUNTER — TELEPHONE (OUTPATIENT)
Dept: INTERNAL MEDICINE | Facility: CLINIC | Age: 82
End: 2022-02-16

## 2022-02-16 NOTE — TELEPHONE ENCOUNTER
Pharmacy Name:  [facility] #[id number] - [place], [place] - [address] AT [facility] OF [address]/[facility]     Pharmacy representative name: VICENTE     Pharmacy representative phone number: [phone]    What medication are you calling in regards to: SITagliptin (Januvia) 50 MG tablet    Additional notes: AYDIN STATED: PATIENT TOLD PHARMACY THAT SHE HAS BEEN TAKING 2 PER DAY, NEEDING SCRIPT THAT REFLECTS THAT FOR SITagliptin (Januvia) 50 MG tablet

## 2022-02-16 NOTE — TELEPHONE ENCOUNTER
The 50 mg tablets were sent to the pharmacy yesterday, please call and confirm that they received this.  If not, can refill.

## 2022-02-18 ENCOUNTER — TELEPHONE (OUTPATIENT)
Dept: INTERNAL MEDICINE | Facility: CLINIC | Age: 82
End: 2022-02-18

## 2022-02-18 NOTE — TELEPHONE ENCOUNTER
Caller: [facility] #[id number] - [place], [place] - [address] AT [address] & [place] - [phone] [facility] [phone] FX    Relationship: Pharmacy    Best call back number: [phone]    Requested Prescriptions:   Requested Prescriptions     Pending Prescriptions Disp Refills   • SITagliptin (Januvia) 50 MG tablet 90 tablet 1     Sig: Take 1 tablet by mouth Daily.        Pharmacy where request should be sent:   [facility] #[id number] - [place], [place] - [address] AT [address] & [place] - [phone] [facility] [phone] FX  [phone]     Additional details provided by patient: THIS REFILL HAS BEEN SENT INTO THE [facility], HOWEVER THEY CALLED AND SAID WE SENT OVER A 25 MG REFILL INSTEAD OF THE 50MG.         Glenis Álvarez Rep   02/18/22 10:10 EST

## 2022-03-15 ENCOUNTER — PATIENT OUTREACH (OUTPATIENT)
Dept: CASE MANAGEMENT | Facility: OTHER | Age: 82
End: 2022-03-15

## 2022-03-15 NOTE — OUTREACH NOTE
Patient Outreach    Patient has resources in place to assist with medication costs. Patient has no further needs. MSW to discharge from High Risk Case Management.    KUSUM Ríos  Ambulatory Case Management    3/15/2022 10:13 EDT

## 2022-03-28 ENCOUNTER — OFFICE VISIT (OUTPATIENT)
Dept: INTERNAL MEDICINE | Facility: CLINIC | Age: 82
End: 2022-03-28

## 2022-03-28 VITALS
DIASTOLIC BLOOD PRESSURE: 78 MMHG | WEIGHT: 189 LBS | HEART RATE: 97 BPM | TEMPERATURE: 98.4 F | OXYGEN SATURATION: 98 % | SYSTOLIC BLOOD PRESSURE: 122 MMHG | BODY MASS INDEX: 33.48 KG/M2

## 2022-03-28 DIAGNOSIS — N18.32 STAGE 3B CHRONIC KIDNEY DISEASE: ICD-10-CM

## 2022-03-28 DIAGNOSIS — E11.65 TYPE 2 DIABETES MELLITUS WITH HYPERGLYCEMIA, WITHOUT LONG-TERM CURRENT USE OF INSULIN: Primary | ICD-10-CM

## 2022-03-28 DIAGNOSIS — K21.9 GASTROESOPHAGEAL REFLUX DISEASE, UNSPECIFIED WHETHER ESOPHAGITIS PRESENT: ICD-10-CM

## 2022-03-28 DIAGNOSIS — Z23 NEED FOR VACCINATION: ICD-10-CM

## 2022-03-28 DIAGNOSIS — E03.9 HYPOTHYROIDISM, UNSPECIFIED TYPE: ICD-10-CM

## 2022-03-28 DIAGNOSIS — I10 ESSENTIAL HYPERTENSION: ICD-10-CM

## 2022-03-28 DIAGNOSIS — E55.9 VITAMIN D DEFICIENCY: ICD-10-CM

## 2022-03-28 DIAGNOSIS — Z78.0 POSTMENOPAUSAL: ICD-10-CM

## 2022-03-28 DIAGNOSIS — E78.5 HYPERLIPIDEMIA, UNSPECIFIED HYPERLIPIDEMIA TYPE: ICD-10-CM

## 2022-03-28 DIAGNOSIS — J30.9 ALLERGIC RHINITIS, UNSPECIFIED SEASONALITY, UNSPECIFIED TRIGGER: ICD-10-CM

## 2022-03-28 DIAGNOSIS — R23.2 HOT FLASHES: ICD-10-CM

## 2022-03-28 DIAGNOSIS — Z12.31 VISIT FOR SCREENING MAMMOGRAM: ICD-10-CM

## 2022-03-28 LAB
ALBUMIN UR-MCNC: 1.2 MG/DL
BASOPHILS # BLD AUTO: 0.04 10*3/MM3 (ref 0–0.2)
BASOPHILS NFR BLD AUTO: 0.6 % (ref 0–1.5)
DEPRECATED RDW RBC AUTO: 46.6 FL (ref 37–54)
EOSINOPHIL # BLD AUTO: 0.63 10*3/MM3 (ref 0–0.4)
EOSINOPHIL NFR BLD AUTO: 10.2 % (ref 0.3–6.2)
ERYTHROCYTE [DISTWIDTH] IN BLOOD BY AUTOMATED COUNT: 13.6 % (ref 12.3–15.4)
HBA1C MFR BLD: 8.6 % (ref 4.8–5.6)
HCT VFR BLD AUTO: 43.6 % (ref 34–46.6)
HGB BLD-MCNC: 13.9 G/DL (ref 12–15.9)
IMM GRANULOCYTES # BLD AUTO: 0.02 10*3/MM3 (ref 0–0.05)
IMM GRANULOCYTES NFR BLD AUTO: 0.3 % (ref 0–0.5)
LYMPHOCYTES # BLD AUTO: 1.2 10*3/MM3 (ref 0.7–3.1)
LYMPHOCYTES NFR BLD AUTO: 19.4 % (ref 19.6–45.3)
MCH RBC QN AUTO: 29.1 PG (ref 26.6–33)
MCHC RBC AUTO-ENTMCNC: 31.9 G/DL (ref 31.5–35.7)
MCV RBC AUTO: 91.4 FL (ref 79–97)
MONOCYTES # BLD AUTO: 0.57 10*3/MM3 (ref 0.1–0.9)
MONOCYTES NFR BLD AUTO: 9.2 % (ref 5–12)
NEUTROPHILS NFR BLD AUTO: 3.73 10*3/MM3 (ref 1.7–7)
NEUTROPHILS NFR BLD AUTO: 60.3 % (ref 42.7–76)
NRBC BLD AUTO-RTO: 0 /100 WBC (ref 0–0.2)
PLATELET # BLD AUTO: 288 10*3/MM3 (ref 140–450)
PMV BLD AUTO: 10.8 FL (ref 6–12)
RBC # BLD AUTO: 4.77 10*6/MM3 (ref 3.77–5.28)
WBC NRBC COR # BLD: 6.19 10*3/MM3 (ref 3.4–10.8)

## 2022-03-28 PROCEDURE — 85025 COMPLETE CBC W/AUTO DIFF WBC: CPT | Performed by: NURSE PRACTITIONER

## 2022-03-28 PROCEDURE — G0009 ADMIN PNEUMOCOCCAL VACCINE: HCPCS | Performed by: NURSE PRACTITIONER

## 2022-03-28 PROCEDURE — 82043 UR ALBUMIN QUANTITATIVE: CPT | Performed by: NURSE PRACTITIONER

## 2022-03-28 PROCEDURE — 36415 COLL VENOUS BLD VENIPUNCTURE: CPT | Performed by: NURSE PRACTITIONER

## 2022-03-28 PROCEDURE — 99214 OFFICE O/P EST MOD 30 MIN: CPT | Performed by: NURSE PRACTITIONER

## 2022-03-28 PROCEDURE — 83036 HEMOGLOBIN GLYCOSYLATED A1C: CPT | Performed by: NURSE PRACTITIONER

## 2022-03-28 PROCEDURE — 90732 PPSV23 VACC 2 YRS+ SUBQ/IM: CPT | Performed by: NURSE PRACTITIONER

## 2022-03-28 PROCEDURE — 80053 COMPREHEN METABOLIC PANEL: CPT | Performed by: NURSE PRACTITIONER

## 2022-03-28 PROCEDURE — 84439 ASSAY OF FREE THYROXINE: CPT | Performed by: NURSE PRACTITIONER

## 2022-03-28 PROCEDURE — 82306 VITAMIN D 25 HYDROXY: CPT | Performed by: NURSE PRACTITIONER

## 2022-03-28 PROCEDURE — 84443 ASSAY THYROID STIM HORMONE: CPT | Performed by: NURSE PRACTITIONER

## 2022-03-28 PROCEDURE — 80061 LIPID PANEL: CPT | Performed by: NURSE PRACTITIONER

## 2022-03-28 RX ORDER — MONTELUKAST SODIUM 10 MG/1
10 TABLET ORAL NIGHTLY
Qty: 90 TABLET | Refills: 1 | Status: SHIPPED | OUTPATIENT
Start: 2022-03-28 | End: 2022-09-02 | Stop reason: SDUPTHER

## 2022-03-28 NOTE — PROGRESS NOTES
Chief Complaint  Diabetes (No concerns)    Subjective          Griselda Henderson presents to [facility]  GERD-  Typically well controlled with esomeprazole.  Patient admits to one episode of vomiting since her last visit, attributes to known triggers.     HLD-  Managed with statin.  Well tolerated, denies leg cramping.  Most recent LDL 56.     DM2-  Currently managed with Januvia, Jardiance, Metformin.  She has been out of blood glucose test strips for a while so has not been checking her levels.  Patient states she and her  recently returned from a 2-week vacation in [place], has not been watching her diet closely.  Denies lows.  Most recent A1c 8.28. Diabetic eye exam: 12/2021. Diabetic foot exam: 2/2021, due. Urine microalbumin: 2/2021, due. Renal protection: ACE. Pneumonia vaccination: Up to date.     Hot flashes-  Currently managed with Effexor and estradiol.  Have previously had in-depth discussion with patient regarding risk of HRT, she is not interested in discontinuing medication at this time.     CKD-  Most recent GFR 41. Scheduled with nephrology next month.      Hypothyroid-  Managed with Synthroid, well controlled on previous labs.     HTN-  Managed with lisinopril, this was previously discontinued by nephrology but recently restarted.  She does not check her blood pressure at home. Denies chest pain, headache, blurry vision, leg swelling.     Vitamin D deficiency-  Managed with oral supplement.     Allergic rhinitis-  Managed with Zyrtec. Patient states her allergies remain uncontrolled despite medication.  She would be interested in further medication management to improve symptoms.     Shingles vaccination: Up to date  COVID-19 vaccination: Up to date, has had booster  Influenza vaccination: Up to date  Pneumonia vaccination: Would like  Mammogram: 4/2021  DEXA: Due      Objective   Vital Signs:   /78   Pulse 97   Temp 98.4 °F (36.9 °C)    Wt 85.7 kg (189 lb)   SpO2 98%   BMI 33.48 kg/m²            Physical Exam  Constitutional:       Appearance: Normal appearance.   HENT:      Head: Normocephalic and atraumatic.      Nose: Nose normal.      Mouth/Throat:      Mouth: Mucous membranes are moist.      Pharynx: Oropharynx is clear.   Eyes:      Extraocular Movements: Extraocular movements intact.      Conjunctiva/sclera: Conjunctivae normal.      Pupils: Pupils are equal, round, and reactive to light.   Neck:      Thyroid: No thyroid mass, thyromegaly or thyroid tenderness.   Cardiovascular:      Rate and Rhythm: Normal rate and regular rhythm.      Heart sounds: Normal heart sounds.   Pulmonary:      Effort: Pulmonary effort is normal.      Breath sounds: Normal breath sounds.   Skin:     General: Skin is warm and dry.   Neurological:      General: No focal deficit present.      Mental Status: She is alert and oriented to person, place, and time.   Psychiatric:         Mood and Affect: Mood normal.         Behavior: Behavior normal.         Thought Content: Thought content normal.        Result Review :                 Assessment and Plan    Diagnoses and all orders for this visit:    1. Type 2 diabetes mellitus with hyperglycemia, without long-term current use of insulin (HCC) (Primary)  Assessment & Plan:  Poorly controlled, most recent HgbA1c 8.28. Encouraged patient to continue to monitor blood glucose levels and to call with consistent elevations. Repeat labs in clinic today, will determine medication adjustments based on results. Diabetic eye exam: 12/2021. Diabetic foot exam: Today.  Urine microalbumin: Today. Pneumonia vaccination: Today. Renal protection: ACE.      Orders:  -     CBC & Differential  -     Comprehensive Metabolic Panel  -     Lipid Panel  -     TSH  -     MicroAlbumin, Urine, Random - Urine, Clean Catch  -     Hemoglobin A1c    2. Essential hypertension  Assessment & Plan:  Well controlled, continue lisinopril.  Encouraged  patient to monitor blood pressure at home and to call or return to clinic with consistent elevations greater than 130/80. Labs in clinic today to include CBC, CMP.        3. Vitamin D deficiency  Assessment & Plan:  Continue oral supplement, vitamin D level with labs.    Orders:  -     Vitamin D 25 hydroxy    4. Hypothyroidism, unspecified type  Assessment & Plan:  Well controlled on previous labs, continue Synthroid. Thyroid profile in clinic today. Will adjust medication based on results of labs.      Orders:  -     T4, Free    5. Hyperlipidemia, unspecified hyperlipidemia type  Assessment & Plan:  Well controlled, continue statin. Most recent LDL 56. Lipid panel with labs.        6. Hot flashes  Assessment & Plan:  Continue Effexor and estradiol.  Patient aware of risk associated with HRT, she is not interested in discontinuing medication at this time.  Will continue to monitor.      7. Visit for screening mammogram  Comments:  Mammogram ordered.  Orders:  -     Mammo Screening Digital Tomosynthesis Bilateral With CAD; Future    8. Gastroesophageal reflux disease, unspecified whether esophagitis present  Assessment & Plan:  Well-controlled, continue esomeprazole.  Patient to avoid triggers.      9. Need for vaccination  Comments:  Pneumonia vaccination today.  Orders:  -     Pneumococcal Polysaccharide Vaccine 23-Valent (PPSV23) Greater Than or Equal To 1yo Subcutaneous / IM    10. Allergic rhinitis, unspecified seasonality, unspecified trigger  Assessment & Plan:  Continue Zyrtec, will add Singulair.  Patient is worried about cost, if Singulair is too expensive will discontinue Zyrtec and trial Allegra versus Claritin.      11. Postmenopausal  Comments:  DEXA ordered.  Orders:  -     DEXA Bone Density Axial    12. Stage 3b chronic kidney disease (HCC)  Assessment & Plan:  Most recent GFR 41, patient to follow-up with nephrology as scheduled.  Will continue to monitor.      Other orders  -     Diclofenac Sodium  (VOLTAREN) 1 % gel gel; Apply  topically to the appropriate area as directed 4 (Four) Times a Day.  Dispense: 100 g; Refill: 1  -     glucose blood test strip; Test blood glucose levels four times daily; Contour Next Test Strips  Dispense: 100 each; Refill: 3  -     montelukast (Singulair) 10 MG tablet; Take 1 tablet by mouth Every Night.  Dispense: 90 tablet; Refill: 1      Follow Up   Return in about 3 months (around 6/28/2022).  Patient was given instructions and counseling regarding her condition or for health maintenance advice. Please see specific information pulled into the AVS if appropriate.

## 2022-03-28 NOTE — ASSESSMENT & PLAN NOTE
Well controlled, continue lisinopril.  Encouraged patient to monitor blood pressure at home and to call or return to clinic with consistent elevations greater than 130/80. Labs in clinic today to include CBC, CMP.

## 2022-03-28 NOTE — ASSESSMENT & PLAN NOTE
Well controlled on previous labs, continue Synthroid. Thyroid profile in clinic today. Will adjust medication based on results of labs.

## 2022-03-28 NOTE — ASSESSMENT & PLAN NOTE
Continue Effexor and estradiol.  Patient aware of risk associated with HRT, she is not interested in discontinuing medication at this time.  Will continue to monitor.

## 2022-03-28 NOTE — ASSESSMENT & PLAN NOTE
Poorly controlled, most recent HgbA1c 8.28. Encouraged patient to continue to monitor blood glucose levels and to call with consistent elevations. Repeat labs in clinic today, will determine medication adjustments based on results. Diabetic eye exam: 12/2021. Diabetic foot exam: Today.  Urine microalbumin: Today. Pneumonia vaccination: Today. Renal protection: ACE.

## 2022-03-28 NOTE — ASSESSMENT & PLAN NOTE
Continue Zyrtec, will add Singulair.  Patient is worried about cost, if Singulair is too expensive will discontinue Zyrtec and trial Allegra versus Claritin.

## 2022-03-29 LAB
25(OH)D3 SERPL-MCNC: 39 NG/ML (ref 30–100)
ALBUMIN SERPL-MCNC: 4.4 G/DL (ref 3.5–5.2)
ALBUMIN/GLOB SERPL: 1.6 G/DL
ALP SERPL-CCNC: 65 U/L (ref 39–117)
ALT SERPL W P-5'-P-CCNC: 15 U/L (ref 1–33)
ANION GAP SERPL CALCULATED.3IONS-SCNC: 10 MMOL/L (ref 5–15)
AST SERPL-CCNC: 20 U/L (ref 1–32)
BILIRUB SERPL-MCNC: 0.4 MG/DL (ref 0–1.2)
BUN SERPL-MCNC: 18 MG/DL (ref 8–23)
BUN/CREAT SERPL: 16.4 (ref 7–25)
CALCIUM SPEC-SCNC: 9.3 MG/DL (ref 8.6–10.5)
CHLORIDE SERPL-SCNC: 104 MMOL/L (ref 98–107)
CHOLEST SERPL-MCNC: 160 MG/DL (ref 0–200)
CO2 SERPL-SCNC: 26 MMOL/L (ref 22–29)
CREAT SERPL-MCNC: 1.1 MG/DL (ref 0.57–1)
EGFRCR SERPLBLD CKD-EPI 2021: 51.2 ML/MIN/1.73
GLOBULIN UR ELPH-MCNC: 2.8 GM/DL
GLUCOSE SERPL-MCNC: 189 MG/DL (ref 65–99)
HDLC SERPL-MCNC: 67 MG/DL (ref 40–60)
LDLC SERPL CALC-MCNC: 63 MG/DL (ref 0–100)
LDLC/HDLC SERPL: 0.83 {RATIO}
POTASSIUM SERPL-SCNC: 4.8 MMOL/L (ref 3.5–5.2)
PROT SERPL-MCNC: 7.2 G/DL (ref 6–8.5)
SODIUM SERPL-SCNC: 140 MMOL/L (ref 136–145)
T4 FREE SERPL-MCNC: 1 NG/DL (ref 0.93–1.7)
TRIGL SERPL-MCNC: 186 MG/DL (ref 0–150)
TSH SERPL DL<=0.05 MIU/L-ACNC: 4.15 UIU/ML (ref 0.27–4.2)
VLDLC SERPL-MCNC: 30 MG/DL (ref 5–40)

## 2022-04-22 RX ORDER — LEVOTHYROXINE SODIUM 0.03 MG/1
TABLET ORAL
Qty: 90 TABLET | Refills: 1 | Status: SHIPPED | OUTPATIENT
Start: 2022-04-22 | End: 2022-10-21

## 2022-05-12 ENCOUNTER — DOCUMENTATION (OUTPATIENT)
Dept: INTERNAL MEDICINE | Facility: CLINIC | Age: 82
End: 2022-05-12

## 2022-05-12 ENCOUNTER — TELEPHONE (OUTPATIENT)
Dept: INTERNAL MEDICINE | Facility: CLINIC | Age: 82
End: 2022-05-12

## 2022-05-12 NOTE — TELEPHONE ENCOUNTER
Pt called back and I informed her that they are missing that information. Patient stated she has mailed it to them 3 times. I advised her we can try to fax it to the number given if she can drop off that paperwork whenever she can.

## 2022-05-12 NOTE — TELEPHONE ENCOUNTER
Assistance for Jardiance was unable to be processed due to no proof of income. Lvm and sent mychart.

## 2022-06-01 ENCOUNTER — OFFICE VISIT (OUTPATIENT)
Dept: INTERNAL MEDICINE | Facility: CLINIC | Age: 82
End: 2022-06-01

## 2022-06-01 VITALS
DIASTOLIC BLOOD PRESSURE: 58 MMHG | WEIGHT: 185.2 LBS | OXYGEN SATURATION: 95 % | BODY MASS INDEX: 32.82 KG/M2 | HEIGHT: 63 IN | HEART RATE: 97 BPM | TEMPERATURE: 98.6 F | SYSTOLIC BLOOD PRESSURE: 120 MMHG

## 2022-06-01 DIAGNOSIS — E11.65 TYPE 2 DIABETES MELLITUS WITH HYPERGLYCEMIA, WITHOUT LONG-TERM CURRENT USE OF INSULIN: Primary | ICD-10-CM

## 2022-06-01 DIAGNOSIS — E78.5 HYPERLIPIDEMIA, UNSPECIFIED HYPERLIPIDEMIA TYPE: ICD-10-CM

## 2022-06-01 DIAGNOSIS — J30.9 ALLERGIC RHINITIS, UNSPECIFIED SEASONALITY, UNSPECIFIED TRIGGER: ICD-10-CM

## 2022-06-01 DIAGNOSIS — Z78.0 POSTMENOPAUSAL: ICD-10-CM

## 2022-06-01 DIAGNOSIS — E03.9 HYPOTHYROIDISM, UNSPECIFIED TYPE: ICD-10-CM

## 2022-06-01 DIAGNOSIS — E55.9 VITAMIN D DEFICIENCY: ICD-10-CM

## 2022-06-01 DIAGNOSIS — N18.31 STAGE 3A CHRONIC KIDNEY DISEASE: ICD-10-CM

## 2022-06-01 DIAGNOSIS — I10 ESSENTIAL HYPERTENSION: ICD-10-CM

## 2022-06-01 DIAGNOSIS — R23.2 HOT FLASHES: ICD-10-CM

## 2022-06-01 DIAGNOSIS — K21.9 GASTROESOPHAGEAL REFLUX DISEASE, UNSPECIFIED WHETHER ESOPHAGITIS PRESENT: ICD-10-CM

## 2022-06-01 LAB
25(OH)D3 SERPL-MCNC: 55.2 NG/ML (ref 30–100)
BACTERIA UR QL AUTO: ABNORMAL /HPF
BASOPHILS # BLD AUTO: 0.06 10*3/MM3 (ref 0–0.2)
BASOPHILS NFR BLD AUTO: 1 % (ref 0–1.5)
BILIRUB UR QL STRIP: NEGATIVE
CLARITY UR: CLEAR
COLOR UR: YELLOW
DEPRECATED RDW RBC AUTO: 41.2 FL (ref 37–54)
EOSINOPHIL # BLD AUTO: 0.39 10*3/MM3 (ref 0–0.4)
EOSINOPHIL NFR BLD AUTO: 6.4 % (ref 0.3–6.2)
ERYTHROCYTE [DISTWIDTH] IN BLOOD BY AUTOMATED COUNT: 12.5 % (ref 12.3–15.4)
GLUCOSE UR STRIP-MCNC: ABNORMAL MG/DL
HBA1C MFR BLD: 8.5 % (ref 4.8–5.6)
HCT VFR BLD AUTO: 43.6 % (ref 34–46.6)
HGB BLD-MCNC: 13.9 G/DL (ref 12–15.9)
HGB UR QL STRIP.AUTO: NEGATIVE
HYALINE CASTS UR QL AUTO: ABNORMAL /LPF
IMM GRANULOCYTES # BLD AUTO: 0.02 10*3/MM3 (ref 0–0.05)
IMM GRANULOCYTES NFR BLD AUTO: 0.3 % (ref 0–0.5)
KETONES UR QL STRIP: ABNORMAL
LEUKOCYTE ESTERASE UR QL STRIP.AUTO: NEGATIVE
LYMPHOCYTES # BLD AUTO: 1.2 10*3/MM3 (ref 0.7–3.1)
LYMPHOCYTES NFR BLD AUTO: 19.7 % (ref 19.6–45.3)
MCH RBC QN AUTO: 29.1 PG (ref 26.6–33)
MCHC RBC AUTO-ENTMCNC: 31.9 G/DL (ref 31.5–35.7)
MCV RBC AUTO: 91.2 FL (ref 79–97)
MONOCYTES # BLD AUTO: 0.49 10*3/MM3 (ref 0.1–0.9)
MONOCYTES NFR BLD AUTO: 8 % (ref 5–12)
NEUTROPHILS NFR BLD AUTO: 3.94 10*3/MM3 (ref 1.7–7)
NEUTROPHILS NFR BLD AUTO: 64.6 % (ref 42.7–76)
NITRITE UR QL STRIP: NEGATIVE
NRBC BLD AUTO-RTO: 0 /100 WBC (ref 0–0.2)
PH UR STRIP.AUTO: <=5 [PH] (ref 5–8)
PLATELET # BLD AUTO: 252 10*3/MM3 (ref 140–450)
PMV BLD AUTO: 11.1 FL (ref 6–12)
PROT UR QL STRIP: NEGATIVE
RBC # BLD AUTO: 4.78 10*6/MM3 (ref 3.77–5.28)
RBC # UR STRIP: ABNORMAL /HPF
REF LAB TEST METHOD: ABNORMAL
SP GR UR STRIP: 1.01 (ref 1–1.03)
SQUAMOUS #/AREA URNS HPF: ABNORMAL /HPF
UROBILINOGEN UR QL STRIP: ABNORMAL
WBC # UR STRIP: ABNORMAL /HPF
WBC NRBC COR # BLD: 6.1 10*3/MM3 (ref 3.4–10.8)

## 2022-06-01 PROCEDURE — 80061 LIPID PANEL: CPT | Performed by: NURSE PRACTITIONER

## 2022-06-01 PROCEDURE — 82570 ASSAY OF URINE CREATININE: CPT | Performed by: NURSE PRACTITIONER

## 2022-06-01 PROCEDURE — 84443 ASSAY THYROID STIM HORMONE: CPT | Performed by: NURSE PRACTITIONER

## 2022-06-01 PROCEDURE — 80053 COMPREHEN METABOLIC PANEL: CPT | Performed by: NURSE PRACTITIONER

## 2022-06-01 PROCEDURE — 87086 URINE CULTURE/COLONY COUNT: CPT | Performed by: NURSE PRACTITIONER

## 2022-06-01 PROCEDURE — 81001 URINALYSIS AUTO W/SCOPE: CPT | Performed by: NURSE PRACTITIONER

## 2022-06-01 PROCEDURE — 99214 OFFICE O/P EST MOD 30 MIN: CPT | Performed by: NURSE PRACTITIONER

## 2022-06-01 PROCEDURE — 84156 ASSAY OF PROTEIN URINE: CPT | Performed by: NURSE PRACTITIONER

## 2022-06-01 PROCEDURE — 85025 COMPLETE CBC W/AUTO DIFF WBC: CPT | Performed by: NURSE PRACTITIONER

## 2022-06-01 PROCEDURE — 82306 VITAMIN D 25 HYDROXY: CPT | Performed by: NURSE PRACTITIONER

## 2022-06-01 PROCEDURE — 36415 COLL VENOUS BLD VENIPUNCTURE: CPT | Performed by: NURSE PRACTITIONER

## 2022-06-01 PROCEDURE — 83036 HEMOGLOBIN GLYCOSYLATED A1C: CPT | Performed by: NURSE PRACTITIONER

## 2022-06-01 RX ORDER — DIPHENOXYLATE HYDROCHLORIDE AND ATROPINE SULFATE 2.5; .025 MG/1; MG/1
1 TABLET ORAL
COMMUNITY

## 2022-06-01 NOTE — ASSESSMENT & PLAN NOTE
Continue Effexor and estradiol. Again discussed risks with patient of HRT, she is not interested in weaning at this time. Will continue to monitor.

## 2022-06-01 NOTE — ASSESSMENT & PLAN NOTE
Repeat CMP today, also urine tests per nephrology lab order. She will follow up with nephrology as scheduled. Will continue to monitor.

## 2022-06-01 NOTE — ASSESSMENT & PLAN NOTE
Continue esomeprazole. Patient to call clinic if symptoms persist, could consider EGD in the future.

## 2022-06-01 NOTE — PROGRESS NOTES
Chief Complaint  Follow-up (3 month /), Diabetes, Hypertension, Hypothyroidism, Hyperlipidemia, Allergic Rhinitis, and Chronic Kidney Disease    Subjective        Griselda Henderson presents to [facility]  GERD-  Managed with esomeprazole.  Patient admits to 1-2 episodes of vomiting since her last visit but feels that symptoms are much better controlled than they were in the past. She is not interested in repeat EGD at this time.      HLD-  Managed with statin.  Well tolerated, denies leg cramping.  Most recent LDL 63.     DM2-  Currently managed with Januvia, Jardiance, Metformin. Admits that she has been watching her diet more closely but she has not been checking her blood sugar at home. Denies lows.  Most recent A1c 8.6. Diabetic eye exam: 12/2021. Diabetic foot exam: 2/2022. Urine microalbumin: 2/2022. Renal protection: ACE. Pneumonia vaccination: Up to date.     Hot flashes-  Currently managed with Effexor and estradiol.  Patient aware of risk of HRT, she is not interested in discontinuing medication at this time. Has trialed and failed weaning process in the past.      CKD-  Most recent GFR 51.2. Scheduled with nephrology 10/2022.      Hypothyroid-  Managed with Synthroid, well controlled on previous labs.     HTN-  Managed with lisinopril. Reports home blood pressure readings 110s/50s. Denies chest pain, headache, blurry vision, leg swelling.     Vitamin D deficiency-  Managed with oral supplement.     Allergic rhinitis-  Managed with Zyrtec, Singulair started at previous visit. Patient states that her allergies have still been bad this summer but she does feel like this helped to improve symptoms.      Shingles vaccination: Up to date  COVID-19 vaccination: Up to date, has had two COVID boosters  Pneumonia vaccination: Up to date  Mammogram: 4/2021, scheduled next week  DEXA: Scheduled next week       Objective   Vital Signs:  /58 (BP Location: Left arm,  "Patient Position: Sitting, Cuff Size: Adult)   Pulse 97   Temp 98.6 °F (37 °C) (Oral)   Ht 160 cm (63\")   Wt 84 kg (185 lb 3.2 oz)   SpO2 95%   BMI 32.81 kg/m²           Physical Exam  Constitutional:       Appearance: Normal appearance.   HENT:      Head: Normocephalic and atraumatic.      Nose: Nose normal.      Mouth/Throat:      Mouth: Mucous membranes are moist.      Pharynx: Oropharynx is clear.   Eyes:      Extraocular Movements: Extraocular movements intact.      Conjunctiva/sclera: Conjunctivae normal.      Pupils: Pupils are equal, round, and reactive to light.   Neck:      Thyroid: No thyroid mass, thyromegaly or thyroid tenderness.   Cardiovascular:      Rate and Rhythm: Normal rate and regular rhythm.      Heart sounds: Normal heart sounds.   Pulmonary:      Effort: Pulmonary effort is normal.      Breath sounds: Normal breath sounds.   Skin:     General: Skin is warm and dry.   Neurological:      General: No focal deficit present.      Mental Status: She is alert and oriented to person, place, and time.   Psychiatric:         Mood and Affect: Mood normal.         Behavior: Behavior normal.         Thought Content: Thought content normal.        Result Review :                Assessment and Plan   Diagnoses and all orders for this visit:    1. Type 2 diabetes mellitus with hyperglycemia, without long-term current use of insulin (HCC) (Primary)  Assessment & Plan:  Continue Januvia, Jardiance and Metformin. Patient admits to lifestyle changes since most recent HgbA1c 8.6. Encouraged patient to continue to monitor blood glucose levels and to call with consistent elevations. Repeat labs in clinic today. Diabetic eye exam: 12/2021. Diabetic foot exam: 2/2022. Urine microalbumin: 2/2022. Renal protection: ACE. Pneumonia vaccination: Up to date.    Orders:  -     CBC & Differential  -     Comprehensive Metabolic Panel  -     Lipid Panel  -     TSH  -     Hemoglobin A1c    2. Gastroesophageal reflux " disease, unspecified whether esophagitis present  Assessment & Plan:  Continue esomeprazole. Patient to call clinic if symptoms persist, could consider EGD in the future.      3. Hyperlipidemia, unspecified hyperlipidemia type  Assessment & Plan:  Well controlled, continue statin. Most recent LDL 63. Lipid panel with labs.      Orders:  -     Lipid Panel    4. Essential hypertension  Assessment & Plan:  Well controlled, continue lisinopril. Encouraged patient to continue to monitor blood pressure at home and to call or return to clinic with consistent elevations greater than 130/80. Labs in clinic today.    Orders:  -     CBC & Differential  -     Comprehensive Metabolic Panel    5. Vitamin D deficiency  Assessment & Plan:  Continue Vitamin D supplement, Vitamin D level with labs today.       Orders:  -     Vitamin D 25 hydroxy    6. Hot flashes  Assessment & Plan:  Continue Effexor and estradiol. Again discussed risks with patient of HRT, she is not interested in weaning at this time. Will continue to monitor.       7. Postmenopausal    8. Hypothyroidism, unspecified type  Assessment & Plan:  Well controlled on previous labs, continue Synthroid. Thyroid profile in clinic today. Will adjust medication based on results of labs.        9. Allergic rhinitis, unspecified seasonality, unspecified trigger  Assessment & Plan:  Continue Zyrtec and Singulair. Will continue to monitor.      10. Stage 3a chronic kidney disease (HCC)  Assessment & Plan:  Repeat CMP today, also urine tests per nephrology lab order. She will follow up with nephrology as scheduled. Will continue to monitor.     Orders:  -     Cancel: Urinalysis With Microscopic - Urine, Clean Catch  -     Protein / Creatinine Ratio, Urine - Urine, Clean Catch  -     Urinalysis With Culture If Indicated - Urine, Clean Catch  -     Urinalysis, Microscopic Only - Urine, Clean Catch  -     Urine Culture - Urine, Urine, Clean Catch           Follow Up   Return in about  3 months (around 9/1/2022).  Patient was given instructions and counseling regarding her condition or for health maintenance advice. Please see specific information pulled into the AVS if appropriate.

## 2022-06-01 NOTE — ASSESSMENT & PLAN NOTE
Continue Januvia, Jardiance and Metformin. Patient admits to lifestyle changes since most recent HgbA1c 8.6. Encouraged patient to continue to monitor blood glucose levels and to call with consistent elevations. Repeat labs in clinic today. Diabetic eye exam: 12/2021. Diabetic foot exam: 2/2022. Urine microalbumin: 2/2022. Renal protection: ACE. Pneumonia vaccination: Up to date.

## 2022-06-01 NOTE — ASSESSMENT & PLAN NOTE
Well controlled, continue lisinopril. Encouraged patient to continue to monitor blood pressure at home and to call or return to clinic with consistent elevations greater than 130/80. Labs in clinic today.

## 2022-06-02 LAB
ALBUMIN SERPL-MCNC: 4.5 G/DL (ref 3.5–5.2)
ALBUMIN/GLOB SERPL: 1.7 G/DL
ALP SERPL-CCNC: 54 U/L (ref 39–117)
ALT SERPL W P-5'-P-CCNC: 11 U/L (ref 1–33)
ANION GAP SERPL CALCULATED.3IONS-SCNC: 12.6 MMOL/L (ref 5–15)
AST SERPL-CCNC: 20 U/L (ref 1–32)
BACTERIA SPEC AEROBE CULT: NO GROWTH
BILIRUB SERPL-MCNC: 0.5 MG/DL (ref 0–1.2)
BUN SERPL-MCNC: 31 MG/DL (ref 8–23)
BUN/CREAT SERPL: 24 (ref 7–25)
CALCIUM SPEC-SCNC: 9.9 MG/DL (ref 8.6–10.5)
CHLORIDE SERPL-SCNC: 106 MMOL/L (ref 98–107)
CHOLEST SERPL-MCNC: 142 MG/DL (ref 0–200)
CO2 SERPL-SCNC: 19.4 MMOL/L (ref 22–29)
CREAT SERPL-MCNC: 1.29 MG/DL (ref 0.57–1)
CREAT UR-MCNC: 83.6 MG/DL
EGFRCR SERPLBLD CKD-EPI 2021: 42.3 ML/MIN/1.73
GLOBULIN UR ELPH-MCNC: 2.6 GM/DL
GLUCOSE SERPL-MCNC: 164 MG/DL (ref 65–99)
HDLC SERPL-MCNC: 69 MG/DL (ref 40–60)
LDLC SERPL CALC-MCNC: 54 MG/DL (ref 0–100)
LDLC/HDLC SERPL: 0.74 {RATIO}
POTASSIUM SERPL-SCNC: 5.5 MMOL/L (ref 3.5–5.2)
PROT ?TM UR-MCNC: 14.9 MG/DL
PROT SERPL-MCNC: 7.1 G/DL (ref 6–8.5)
PROT/CREAT UR: 0.18 MG/G{CREAT}
SODIUM SERPL-SCNC: 138 MMOL/L (ref 136–145)
TRIGL SERPL-MCNC: 108 MG/DL (ref 0–150)
TSH SERPL DL<=0.05 MIU/L-ACNC: 4.06 UIU/ML (ref 0.27–4.2)
VLDLC SERPL-MCNC: 19 MG/DL (ref 5–40)

## 2022-06-02 RX ORDER — ESOMEPRAZOLE MAGNESIUM 40 MG/1
CAPSULE, DELAYED RELEASE ORAL
Qty: 180 CAPSULE | Refills: 1 | Status: SHIPPED | OUTPATIENT
Start: 2022-06-02 | End: 2022-09-02 | Stop reason: SDUPTHER

## 2022-06-03 DIAGNOSIS — E87.5 HYPERKALEMIA: Primary | ICD-10-CM

## 2022-06-14 ENCOUNTER — HOSPITAL ENCOUNTER (OUTPATIENT)
Dept: BONE DENSITY | Facility: HOSPITAL | Age: 82
Discharge: HOME | End: 2022-06-14

## 2022-06-14 ENCOUNTER — HOSPITAL ENCOUNTER (OUTPATIENT)
Dept: MAMMOGRAPHY | Facility: HOSPITAL | Age: 82
Discharge: HOME | End: 2022-06-14

## 2022-06-14 DIAGNOSIS — Z12.31 VISIT FOR SCREENING MAMMOGRAM: ICD-10-CM

## 2022-06-14 PROCEDURE — 77063 BREAST TOMOSYNTHESIS BI: CPT

## 2022-06-14 PROCEDURE — 77067 SCR MAMMO BI INCL CAD: CPT

## 2022-06-14 PROCEDURE — 77080 DXA BONE DENSITY AXIAL: CPT

## 2022-06-15 RX ORDER — VENLAFAXINE 75 MG/1
75 TABLET ORAL DAILY
Qty: 90 TABLET | Refills: 1 | Status: SHIPPED | OUTPATIENT
Start: 2022-06-15 | End: 2022-09-02 | Stop reason: SDUPTHER

## 2022-06-15 RX ORDER — ESTRADIOL 0.5 MG/1
0.5 TABLET ORAL DAILY
Qty: 90 TABLET | Refills: 1 | Status: SHIPPED | OUTPATIENT
Start: 2022-06-15 | End: 2022-12-16

## 2022-07-01 RX ORDER — ATORVASTATIN CALCIUM 10 MG/1
10 TABLET, FILM COATED ORAL NIGHTLY
Qty: 90 TABLET | Refills: 1 | Status: SHIPPED | OUTPATIENT
Start: 2022-07-01 | End: 2023-01-27

## 2022-09-02 ENCOUNTER — OFFICE VISIT (OUTPATIENT)
Dept: INTERNAL MEDICINE | Facility: CLINIC | Age: 82
End: 2022-09-02

## 2022-09-02 VITALS
BODY MASS INDEX: 32.53 KG/M2 | TEMPERATURE: 98.3 F | WEIGHT: 183.6 LBS | SYSTOLIC BLOOD PRESSURE: 128 MMHG | OXYGEN SATURATION: 98 % | DIASTOLIC BLOOD PRESSURE: 69 MMHG | HEART RATE: 85 BPM | HEIGHT: 63 IN

## 2022-09-02 DIAGNOSIS — N18.31 STAGE 3A CHRONIC KIDNEY DISEASE: ICD-10-CM

## 2022-09-02 DIAGNOSIS — J30.9 ALLERGIC RHINITIS, UNSPECIFIED SEASONALITY, UNSPECIFIED TRIGGER: ICD-10-CM

## 2022-09-02 DIAGNOSIS — E11.65 TYPE 2 DIABETES MELLITUS WITH HYPERGLYCEMIA, WITHOUT LONG-TERM CURRENT USE OF INSULIN: Primary | ICD-10-CM

## 2022-09-02 DIAGNOSIS — E87.5 HYPERKALEMIA: ICD-10-CM

## 2022-09-02 DIAGNOSIS — I10 ESSENTIAL HYPERTENSION: ICD-10-CM

## 2022-09-02 DIAGNOSIS — E03.9 HYPOTHYROIDISM, UNSPECIFIED TYPE: ICD-10-CM

## 2022-09-02 DIAGNOSIS — R23.2 HOT FLASHES: ICD-10-CM

## 2022-09-02 DIAGNOSIS — E78.5 HYPERLIPIDEMIA, UNSPECIFIED HYPERLIPIDEMIA TYPE: ICD-10-CM

## 2022-09-02 DIAGNOSIS — K21.9 GASTROESOPHAGEAL REFLUX DISEASE, UNSPECIFIED WHETHER ESOPHAGITIS PRESENT: ICD-10-CM

## 2022-09-02 DIAGNOSIS — E55.9 VITAMIN D DEFICIENCY: ICD-10-CM

## 2022-09-02 LAB
25(OH)D3 SERPL-MCNC: 52.7 NG/ML (ref 30–100)
ALBUMIN SERPL-MCNC: 4.1 G/DL (ref 3.5–5.2)
ALBUMIN/GLOB SERPL: 1.7 G/DL
ALP SERPL-CCNC: 50 U/L (ref 39–117)
ALT SERPL W P-5'-P-CCNC: 17 U/L (ref 1–33)
ANION GAP SERPL CALCULATED.3IONS-SCNC: 11.2 MMOL/L (ref 5–15)
AST SERPL-CCNC: 19 U/L (ref 1–32)
BASOPHILS # BLD AUTO: 0.05 10*3/MM3 (ref 0–0.2)
BASOPHILS NFR BLD AUTO: 1.1 % (ref 0–1.5)
BILIRUB SERPL-MCNC: 0.4 MG/DL (ref 0–1.2)
BUN SERPL-MCNC: 21 MG/DL (ref 8–23)
BUN/CREAT SERPL: 17.4 (ref 7–25)
CALCIUM SPEC-SCNC: 9.4 MG/DL (ref 8.6–10.5)
CHLORIDE SERPL-SCNC: 106 MMOL/L (ref 98–107)
CHOLEST SERPL-MCNC: 140 MG/DL (ref 0–200)
CO2 SERPL-SCNC: 21.8 MMOL/L (ref 22–29)
CREAT SERPL-MCNC: 1.21 MG/DL (ref 0.57–1)
DEPRECATED RDW RBC AUTO: 40.6 FL (ref 37–54)
EGFRCR SERPLBLD CKD-EPI 2021: 45.7 ML/MIN/1.73
EOSINOPHIL # BLD AUTO: 0.24 10*3/MM3 (ref 0–0.4)
EOSINOPHIL NFR BLD AUTO: 5.4 % (ref 0.3–6.2)
ERYTHROCYTE [DISTWIDTH] IN BLOOD BY AUTOMATED COUNT: 12.6 % (ref 12.3–15.4)
GLOBULIN UR ELPH-MCNC: 2.4 GM/DL
GLUCOSE SERPL-MCNC: 198 MG/DL (ref 65–99)
HBA1C MFR BLD: 8.2 % (ref 4.8–5.6)
HCT VFR BLD AUTO: 39.2 % (ref 34–46.6)
HDLC SERPL-MCNC: 70 MG/DL (ref 40–60)
HGB BLD-MCNC: 12.8 G/DL (ref 12–15.9)
IMM GRANULOCYTES # BLD AUTO: 0.01 10*3/MM3 (ref 0–0.05)
IMM GRANULOCYTES NFR BLD AUTO: 0.2 % (ref 0–0.5)
LDLC SERPL CALC-MCNC: 51 MG/DL (ref 0–100)
LDLC/HDLC SERPL: 0.69 {RATIO}
LYMPHOCYTES # BLD AUTO: 0.98 10*3/MM3 (ref 0.7–3.1)
LYMPHOCYTES NFR BLD AUTO: 21.9 % (ref 19.6–45.3)
MCH RBC QN AUTO: 29.8 PG (ref 26.6–33)
MCHC RBC AUTO-ENTMCNC: 32.7 G/DL (ref 31.5–35.7)
MCV RBC AUTO: 91.2 FL (ref 79–97)
MONOCYTES # BLD AUTO: 0.48 10*3/MM3 (ref 0.1–0.9)
MONOCYTES NFR BLD AUTO: 10.7 % (ref 5–12)
NEUTROPHILS NFR BLD AUTO: 2.71 10*3/MM3 (ref 1.7–7)
NEUTROPHILS NFR BLD AUTO: 60.7 % (ref 42.7–76)
NRBC BLD AUTO-RTO: 0 /100 WBC (ref 0–0.2)
PLATELET # BLD AUTO: 242 10*3/MM3 (ref 140–450)
PMV BLD AUTO: 11.3 FL (ref 6–12)
POTASSIUM SERPL-SCNC: 4.9 MMOL/L (ref 3.5–5.2)
PROT SERPL-MCNC: 6.5 G/DL (ref 6–8.5)
RBC # BLD AUTO: 4.3 10*6/MM3 (ref 3.77–5.28)
SODIUM SERPL-SCNC: 139 MMOL/L (ref 136–145)
T4 FREE SERPL-MCNC: 0.86 NG/DL (ref 0.93–1.7)
TRIGL SERPL-MCNC: 108 MG/DL (ref 0–150)
TSH SERPL DL<=0.05 MIU/L-ACNC: 3.53 UIU/ML (ref 0.27–4.2)
VLDLC SERPL-MCNC: 19 MG/DL (ref 5–40)
WBC NRBC COR # BLD: 4.47 10*3/MM3 (ref 3.4–10.8)

## 2022-09-02 PROCEDURE — 84443 ASSAY THYROID STIM HORMONE: CPT | Performed by: NURSE PRACTITIONER

## 2022-09-02 PROCEDURE — 99214 OFFICE O/P EST MOD 30 MIN: CPT | Performed by: NURSE PRACTITIONER

## 2022-09-02 PROCEDURE — 83036 HEMOGLOBIN GLYCOSYLATED A1C: CPT | Performed by: NURSE PRACTITIONER

## 2022-09-02 PROCEDURE — 82306 VITAMIN D 25 HYDROXY: CPT | Performed by: NURSE PRACTITIONER

## 2022-09-02 PROCEDURE — 84439 ASSAY OF FREE THYROXINE: CPT | Performed by: NURSE PRACTITIONER

## 2022-09-02 PROCEDURE — 36415 COLL VENOUS BLD VENIPUNCTURE: CPT | Performed by: NURSE PRACTITIONER

## 2022-09-02 PROCEDURE — 80061 LIPID PANEL: CPT | Performed by: NURSE PRACTITIONER

## 2022-09-02 PROCEDURE — 85025 COMPLETE CBC W/AUTO DIFF WBC: CPT | Performed by: NURSE PRACTITIONER

## 2022-09-02 PROCEDURE — 80053 COMPREHEN METABOLIC PANEL: CPT | Performed by: NURSE PRACTITIONER

## 2022-09-02 RX ORDER — ESOMEPRAZOLE MAGNESIUM 40 MG/1
40 CAPSULE, DELAYED RELEASE ORAL 2 TIMES DAILY
Qty: 180 CAPSULE | Refills: 1 | Status: SHIPPED | OUTPATIENT
Start: 2022-09-02 | End: 2023-01-13 | Stop reason: SDUPTHER

## 2022-09-02 RX ORDER — VENLAFAXINE 75 MG/1
75 TABLET ORAL DAILY
Qty: 90 TABLET | Refills: 1 | Status: SHIPPED | OUTPATIENT
Start: 2022-09-02 | End: 2023-04-05

## 2022-09-02 RX ORDER — CETIRIZINE HYDROCHLORIDE 10 MG/1
10 TABLET ORAL DAILY
Qty: 90 TABLET | Refills: 1 | Status: SHIPPED | OUTPATIENT
Start: 2022-09-02 | End: 2023-03-08 | Stop reason: SDUPTHER

## 2022-09-02 RX ORDER — MONTELUKAST SODIUM 10 MG/1
10 TABLET ORAL NIGHTLY
Qty: 90 TABLET | Refills: 1 | Status: SHIPPED | OUTPATIENT
Start: 2022-09-02 | End: 2023-03-02

## 2022-09-02 NOTE — ASSESSMENT & PLAN NOTE
Poorly controlled on recent labs, A1c 8.5. Continue Januvia, Jardiance, metformin. Will determine further intervention based on results of labs today. Patient to continue to work on lifestyle modifications to improve naturally. Diabetic eye exam: 12/2021, scheduled 11/2022. Diabetic foot exam: 2/2022. Urine microalbumin: 3/2022. Renal protection: ACE. Pneumonia vaccination: Up to date.

## 2022-09-02 NOTE — ASSESSMENT & PLAN NOTE
Patient aware of risks of HRT, not interested in cessation at this time. Continue Effexor and low dose estradiol. Will continue to monitor.

## 2022-09-02 NOTE — PROGRESS NOTES
Chief Complaint  Diabetes    Subjective         Griselda Henderson presents to [facility]  GERD-  Managed with esomeprazole, well controlled per patient. Patient with previous report of vomiting episodes, has had no issues since last visit.      HLD-  Managed with statin.  Well tolerated, denies leg cramping.  Most recent LDL 69.     DM2-  Currently managed with Januvia, Jardiance, Metformin. Continues to get Januvia at low cost through financial assistance program. She has not been checking her blood sugar at home. Denies lows.  Patient admits that she has been eating out more than normal due to her recent move and not being settled into her new home. She feels like she has been eating less and her close are fitting better. Most recent A1c 8.5. Diabetic eye exam: 12/2021, scheduled 11/2022. Diabetic foot exam: 2/2022. Urine microalbumin: 3/2022. Renal protection: ACE. Pneumonia vaccination: Up to date.     Hot flashes-  Currently managed with Effexor and estradiol.  Patient aware of risk of HRT, she is not interested in discontinuing medication at this time. Has trialed and failed weaning process in the past.      CKD-  Most recent GFR 42.3. Potassium elevated on previous labs, patient due for recheck. Scheduled with nephrology 10/2022.      Hypothyroid-  Managed with Synthroid, well controlled on previous labs.     HTN-  Managed with lisinopril. Reports home blood pressure readings 120s/60s. Denies chest pain, headache, blurry vision, leg swelling.     Vitamin D deficiency-  Managed with oral supplement.     Allergic rhinitis-  Managed with Zyrtec and Singulair, states things have been okay lately with the summer months but will typically have breakthrough symptoms in the fall.    Patient recently in the process of moving into her new home, she and her  will be remodeling the basement while her son and his family live upstairs. Admits this has been a very  "stressful process but is hopeful things will continue to improve as they get settled.      Shingles vaccination: Up to date  COVID-19 vaccination: Up to date, has had two COVID boosters  Pneumonia vaccination: Up to date  Mammogram: 6/2022  DEXA: 6/2022       Objective     Vitals:    09/02/22 1158   BP: 128/69   Pulse: 85   Temp: 98.3 °F (36.8 °C)   SpO2: 98%   Weight: 83.3 kg (183 lb 9.6 oz)   Height: 160 cm (63\")      Body mass index is 32.52 kg/m².    Wt Readings from Last 3 Encounters:   09/02/22 83.3 kg (183 lb 9.6 oz)   06/01/22 84 kg (185 lb 3.2 oz)   03/28/22 85.7 kg (189 lb)     BP Readings from Last 3 Encounters:   09/02/22 128/69   06/01/22 120/58   03/28/22 122/78                Physical Exam  Constitutional:       Appearance: Normal appearance.   HENT:      Head: Normocephalic and atraumatic.      Nose: Nose normal.      Mouth/Throat:      Mouth: Mucous membranes are moist.      Pharynx: Oropharynx is clear.   Eyes:      Extraocular Movements: Extraocular movements intact.      Conjunctiva/sclera: Conjunctivae normal.      Pupils: Pupils are equal, round, and reactive to light.   Neck:      Thyroid: No thyroid mass, thyromegaly or thyroid tenderness.   Cardiovascular:      Rate and Rhythm: Normal rate and regular rhythm.      Heart sounds: Normal heart sounds.   Pulmonary:      Effort: Pulmonary effort is normal.      Breath sounds: Normal breath sounds.   Skin:     General: Skin is warm and dry.   Neurological:      General: No focal deficit present.      Mental Status: She is alert and oriented to person, place, and time.   Psychiatric:         Mood and Affect: Mood normal.         Behavior: Behavior normal.         Thought Content: Thought content normal.          Result Review :   The following data was reviewed by: KAT Willingham on 09/02/2022:      Procedures    Assessment and Plan   Diagnoses and all orders for this visit:    1. Type 2 diabetes mellitus with hyperglycemia, without long-term " current use of insulin (HCC) (Primary)  Assessment & Plan:  Poorly controlled on recent labs, A1c 8.5. Continue Januvia, Jardiance, metformin. Will determine further intervention based on results of labs today. Patient to continue to work on lifestyle modifications to improve naturally. Diabetic eye exam: 12/2021, scheduled 11/2022. Diabetic foot exam: 2/2022. Urine microalbumin: 3/2022. Renal protection: ACE. Pneumonia vaccination: Up to date.      Orders:  -     CBC & Differential  -     Comprehensive Metabolic Panel  -     Hemoglobin A1c  -     Lipid Panel    2. Essential hypertension  Assessment & Plan:  Well controlled, continue current medications. Encouraged patient to continue to monitor blood pressure at home and to call or return to clinic with consistent elevations greater than 130/80. Labs in clinic today.        3. Hyperlipidemia, unspecified hyperlipidemia type  Assessment & Plan:  Well controlled, continue statin. Most recent LDL 69. Lipid panel with labs.      Orders:  -     Lipid Panel    4. Stage 3a chronic kidney disease (HCC)    5. Hyperkalemia  Comments:  Noted on previous labs, repeat CMP today.  Orders:  -     Cancel: Basic metabolic panel    6. Gastroesophageal reflux disease, unspecified whether esophagitis present  Assessment & Plan:  Well controlled, continue esomeprazole. Patient to call clinic if breakthrough symptoms return.       7. Vitamin D deficiency  Assessment & Plan:  Continue vitamin D supplement, vitamin D level with labs today.       Orders:  -     Vitamin D 25 hydroxy    8. Hypothyroidism, unspecified type  Assessment & Plan:  Well controlled on previous labs, continue Synthroid. Thyroid profile in clinic today. Will adjust medication based on results of labs.      Orders:  -     TSH  -     T4, Free    9. Allergic rhinitis, unspecified seasonality, unspecified trigger  Assessment & Plan:  Continue Singulair, Zyrtec.       10. Hot flashes  Assessment & Plan:  Patient aware of  risks of HRT, not interested in cessation at this time. Continue Effexor and low dose estradiol. Will continue to monitor.       Other orders  -     cetirizine (zyrTEC) 10 MG tablet; Take 1 tablet by mouth Daily.  Dispense: 90 tablet; Refill: 1  -     montelukast (Singulair) 10 MG tablet; Take 1 tablet by mouth Every Night.  Dispense: 90 tablet; Refill: 1  -     venlafaxine (EFFEXOR) 75 MG tablet; Take 1 tablet by mouth Daily.  Dispense: 90 tablet; Refill: 1  -     SITagliptin (Januvia) 100 MG tablet; Take 1 tablet by mouth Daily for 90 days.  Dispense: 90 tablet; Refill: 1  -     esomeprazole (nexIUM) 40 MG capsule; Take 1 capsule by mouth 2 (Two) Times a Day.  Dispense: 180 capsule; Refill: 1        Follow Up   Return in about 3 months (around 12/2/2022).  Patient was given instructions and counseling regarding her condition or for health maintenance advice. Please see specific information pulled into the AVS if appropriate.

## 2022-09-02 NOTE — ASSESSMENT & PLAN NOTE
Well controlled, continue current medications. Encouraged patient to continue to monitor blood pressure at home and to call or return to clinic with consistent elevations greater than 130/80. Labs in clinic today.

## 2022-09-06 DIAGNOSIS — E03.9 HYPOTHYROIDISM, UNSPECIFIED TYPE: Primary | ICD-10-CM

## 2022-10-04 NOTE — TELEPHONE ENCOUNTER
Patient sent a [employer] message requesting her RX be sent to the [facility] because she was only receiving 30 day supply at a time and they were only giving her 50 mg instead of a 100 mg which is what she takes. I have changed the patient's pharmacy to the correct one that she is requesting and have resent the prescription with the correct dosage and quantity.

## 2022-10-20 ENCOUNTER — CLINICAL SUPPORT (OUTPATIENT)
Dept: INTERNAL MEDICINE | Facility: CLINIC | Age: 82
End: 2022-10-20

## 2022-10-20 DIAGNOSIS — E03.9 HYPOTHYROIDISM, UNSPECIFIED TYPE: ICD-10-CM

## 2022-10-20 LAB
T4 FREE SERPL-MCNC: 0.99 NG/DL (ref 0.93–1.7)
TSH SERPL DL<=0.05 MIU/L-ACNC: 6.51 UIU/ML (ref 0.27–4.2)

## 2022-10-20 PROCEDURE — 36415 COLL VENOUS BLD VENIPUNCTURE: CPT | Performed by: NURSE PRACTITIONER

## 2022-10-20 PROCEDURE — 84443 ASSAY THYROID STIM HORMONE: CPT | Performed by: NURSE PRACTITIONER

## 2022-10-20 PROCEDURE — 84439 ASSAY OF FREE THYROXINE: CPT | Performed by: NURSE PRACTITIONER

## 2022-10-21 RX ORDER — LEVOTHYROXINE SODIUM 0.05 MG/1
50 TABLET ORAL
Qty: 90 TABLET | Refills: 1 | Status: SHIPPED | OUTPATIENT
Start: 2022-10-21 | End: 2023-01-13 | Stop reason: SDUPTHER

## 2022-11-15 RX ORDER — LEVOTHYROXINE SODIUM 0.03 MG/1
TABLET ORAL
Qty: 90 TABLET | Refills: 1 | OUTPATIENT
Start: 2022-11-15

## 2022-12-05 ENCOUNTER — OFFICE VISIT (OUTPATIENT)
Dept: INTERNAL MEDICINE | Facility: CLINIC | Age: 82
End: 2022-12-05

## 2022-12-05 VITALS
HEART RATE: 79 BPM | TEMPERATURE: 98.2 F | WEIGHT: 179 LBS | DIASTOLIC BLOOD PRESSURE: 64 MMHG | HEIGHT: 63 IN | BODY MASS INDEX: 31.71 KG/M2 | OXYGEN SATURATION: 100 % | SYSTOLIC BLOOD PRESSURE: 122 MMHG

## 2022-12-05 DIAGNOSIS — E03.9 HYPOTHYROIDISM, UNSPECIFIED TYPE: ICD-10-CM

## 2022-12-05 DIAGNOSIS — L60.3 BRITTLE NAILS: ICD-10-CM

## 2022-12-05 DIAGNOSIS — I10 ESSENTIAL HYPERTENSION: ICD-10-CM

## 2022-12-05 DIAGNOSIS — E78.5 HYPERLIPIDEMIA, UNSPECIFIED HYPERLIPIDEMIA TYPE: ICD-10-CM

## 2022-12-05 DIAGNOSIS — E11.65 TYPE 2 DIABETES MELLITUS WITH HYPERGLYCEMIA, WITHOUT LONG-TERM CURRENT USE OF INSULIN: Primary | ICD-10-CM

## 2022-12-05 DIAGNOSIS — R23.2 HOT FLASHES: ICD-10-CM

## 2022-12-05 DIAGNOSIS — E55.9 VITAMIN D DEFICIENCY: ICD-10-CM

## 2022-12-05 DIAGNOSIS — J30.9 ALLERGIC RHINITIS, UNSPECIFIED SEASONALITY, UNSPECIFIED TRIGGER: ICD-10-CM

## 2022-12-05 DIAGNOSIS — N18.31 STAGE 3A CHRONIC KIDNEY DISEASE: ICD-10-CM

## 2022-12-05 DIAGNOSIS — R25.2 LEG CRAMPING: ICD-10-CM

## 2022-12-05 DIAGNOSIS — K21.9 GASTROESOPHAGEAL REFLUX DISEASE, UNSPECIFIED WHETHER ESOPHAGITIS PRESENT: ICD-10-CM

## 2022-12-05 PROBLEM — R61 NIGHT SWEATS: Status: ACTIVE | Noted: 2022-12-05

## 2022-12-05 PROBLEM — R01.1 HEART MURMUR: Status: ACTIVE | Noted: 2022-12-05

## 2022-12-05 PROBLEM — K46.9 ABDOMINAL HERNIA: Status: ACTIVE | Noted: 2022-12-05

## 2022-12-05 PROBLEM — K64.9 HEMORRHOID: Status: ACTIVE | Noted: 2022-12-05

## 2022-12-05 PROBLEM — M41.9 SCOLIOSIS OF LUMBAR SPINE: Status: ACTIVE | Noted: 2018-01-29

## 2022-12-05 PROBLEM — H26.9 CATARACT: Status: ACTIVE | Noted: 2022-12-05

## 2022-12-05 PROBLEM — M54.40 LUMBAGO WITH SCIATICA: Status: ACTIVE | Noted: 2017-10-26

## 2022-12-05 PROBLEM — H91.90 DEAFNESS: Status: ACTIVE | Noted: 2022-12-05

## 2022-12-05 PROBLEM — C44.91 BASAL CELL CARCINOMA: Status: ACTIVE | Noted: 2022-12-05

## 2022-12-05 PROBLEM — M54.2 NECK PAIN: Status: ACTIVE | Noted: 2017-10-26

## 2022-12-05 PROBLEM — E87.5 HYPERKALEMIA: Status: ACTIVE | Noted: 2022-11-04

## 2022-12-05 LAB
25(OH)D3 SERPL-MCNC: 59.9 NG/ML (ref 30–100)
BASOPHILS # BLD AUTO: 0.05 10*3/MM3 (ref 0–0.2)
BASOPHILS NFR BLD AUTO: 0.8 % (ref 0–1.5)
DEPRECATED RDW RBC AUTO: 40.3 FL (ref 37–54)
EOSINOPHIL # BLD AUTO: 0.16 10*3/MM3 (ref 0–0.4)
EOSINOPHIL NFR BLD AUTO: 2.7 % (ref 0.3–6.2)
ERYTHROCYTE [DISTWIDTH] IN BLOOD BY AUTOMATED COUNT: 12.2 % (ref 12.3–15.4)
FOLATE SERPL-MCNC: >20 NG/ML (ref 4.78–24.2)
HCT VFR BLD AUTO: 41.5 % (ref 34–46.6)
HGB BLD-MCNC: 13.4 G/DL (ref 12–15.9)
IMM GRANULOCYTES # BLD AUTO: 0.02 10*3/MM3 (ref 0–0.05)
IMM GRANULOCYTES NFR BLD AUTO: 0.3 % (ref 0–0.5)
LYMPHOCYTES # BLD AUTO: 1.18 10*3/MM3 (ref 0.7–3.1)
LYMPHOCYTES NFR BLD AUTO: 19.8 % (ref 19.6–45.3)
MCH RBC QN AUTO: 29.5 PG (ref 26.6–33)
MCHC RBC AUTO-ENTMCNC: 32.3 G/DL (ref 31.5–35.7)
MCV RBC AUTO: 91.4 FL (ref 79–97)
MONOCYTES # BLD AUTO: 0.5 10*3/MM3 (ref 0.1–0.9)
MONOCYTES NFR BLD AUTO: 8.4 % (ref 5–12)
NEUTROPHILS NFR BLD AUTO: 4.04 10*3/MM3 (ref 1.7–7)
NEUTROPHILS NFR BLD AUTO: 68 % (ref 42.7–76)
NRBC BLD AUTO-RTO: 0 /100 WBC (ref 0–0.2)
PLATELET # BLD AUTO: 263 10*3/MM3 (ref 140–450)
PMV BLD AUTO: 10.8 FL (ref 6–12)
RBC # BLD AUTO: 4.54 10*6/MM3 (ref 3.77–5.28)
VIT B12 BLD-MCNC: 468 PG/ML (ref 211–946)
WBC NRBC COR # BLD: 5.95 10*3/MM3 (ref 3.4–10.8)

## 2022-12-05 PROCEDURE — 82746 ASSAY OF FOLIC ACID SERUM: CPT | Performed by: NURSE PRACTITIONER

## 2022-12-05 PROCEDURE — 80053 COMPREHEN METABOLIC PANEL: CPT | Performed by: NURSE PRACTITIONER

## 2022-12-05 PROCEDURE — 80061 LIPID PANEL: CPT | Performed by: NURSE PRACTITIONER

## 2022-12-05 PROCEDURE — 82306 VITAMIN D 25 HYDROXY: CPT | Performed by: NURSE PRACTITIONER

## 2022-12-05 PROCEDURE — 99214 OFFICE O/P EST MOD 30 MIN: CPT | Performed by: NURSE PRACTITIONER

## 2022-12-05 PROCEDURE — 83735 ASSAY OF MAGNESIUM: CPT | Performed by: NURSE PRACTITIONER

## 2022-12-05 PROCEDURE — 82607 VITAMIN B-12: CPT | Performed by: NURSE PRACTITIONER

## 2022-12-05 PROCEDURE — 84443 ASSAY THYROID STIM HORMONE: CPT | Performed by: NURSE PRACTITIONER

## 2022-12-05 PROCEDURE — 85025 COMPLETE CBC W/AUTO DIFF WBC: CPT | Performed by: NURSE PRACTITIONER

## 2022-12-05 PROCEDURE — 36415 COLL VENOUS BLD VENIPUNCTURE: CPT | Performed by: NURSE PRACTITIONER

## 2022-12-05 PROCEDURE — 84439 ASSAY OF FREE THYROXINE: CPT | Performed by: NURSE PRACTITIONER

## 2022-12-05 PROCEDURE — 83036 HEMOGLOBIN GLYCOSYLATED A1C: CPT | Performed by: NURSE PRACTITIONER

## 2022-12-05 RX ORDER — LISINOPRIL 20 MG/1
20 TABLET ORAL DAILY
COMMUNITY
Start: 2022-10-04

## 2022-12-05 NOTE — PROGRESS NOTES
Chief Complaint  Diabetes    Subjective         Griselda Henderson presents to [facility]  GERD-  Well controlled with esomeprazole, patient has been taking medication over the counter due to the donut hole.      HLD-  Managed with statin.  Well tolerated, admits to leg cramping that is worse at night. Also concerned with brittle nails. Most recent LDL 51.     DM2-  Currently managed with Januvia, Jardiance, Metformin.  Patient has paperwork through financial assistance program to be completed for Januvia and Jardiance.  She has not been checking her blood sugar at home. Has been back on a regular routine for a couple of weeks but admits to eating out a lot during renovation of her home. Denies lows.  Most recent A1c 8.2. Diabetic eye exam: 11/2022. Diabetic foot exam: 2/2022. Urine microalbumin: 3/2022. Renal protection: ACE. Pneumonia vaccination: Up to date.     Hot flashes-  Currently managed with Effexor and estradiol. Patient states she continues to have flares but does better than she did prior to medication. Risk of HRT has been discussed in detail in the past, patient is not interested in discontinuing medication at this time.      CKD-  Most recent GFR 45.7. Monitored every six months by nephrology, Dr. Antony. Nothing was changed at most recent appointment.     Hypothyroid-  Managed with Synthroid, well controlled on previous labs. Admits that she was forgetting to take her thyroid medication daily x 3 months through the construction on her house.      HTN-  Managed with lisinopril. She has not been checking her blood pressure at home. Denies chest pain, headache, blurry vision, leg swelling.     Vitamin D deficiency-  Managed with oral supplement.     Allergic rhinitis-  Managed with Zyrtec and Singulair, will sometimes stop medication in the winter depending on symptoms.     Shingles vaccination: Up to date  COVID-19 vaccination: Up to date  Pneumonia  "vaccination: Up to date  Mammogram: 6/2022  DEXA: 6/2022  Influenza vaccination: Up to date          Objective     Vitals:    12/05/22 1306   BP: 122/64   BP Location: Left arm   Patient Position: Sitting   Cuff Size: Adult   Pulse: 79   Temp: 98.2 °F (36.8 °C)   TempSrc: Temporal   SpO2: 100%   Weight: 81.2 kg (179 lb)   Height: 160 cm (63\")      Body mass index is 31.71 kg/m².    Wt Readings from Last 3 Encounters:   12/05/22 81.2 kg (179 lb)   09/02/22 83.3 kg (183 lb 9.6 oz)   06/01/22 84 kg (185 lb 3.2 oz)     BP Readings from Last 3 Encounters:   12/05/22 122/64   09/02/22 128/69   06/01/22 120/58                Physical Exam  Constitutional:       Appearance: Normal appearance.   HENT:      Head: Normocephalic and atraumatic.      Nose: Nose normal.      Mouth/Throat:      Mouth: Mucous membranes are moist.      Pharynx: Oropharynx is clear.   Eyes:      Extraocular Movements: Extraocular movements intact.      Conjunctiva/sclera: Conjunctivae normal.      Pupils: Pupils are equal, round, and reactive to light.   Neck:      Thyroid: No thyroid mass, thyromegaly or thyroid tenderness.   Cardiovascular:      Rate and Rhythm: Normal rate and regular rhythm.      Heart sounds: Normal heart sounds.   Pulmonary:      Effort: Pulmonary effort is normal.      Breath sounds: Normal breath sounds.   Skin:     General: Skin is warm and dry.   Neurological:      General: No focal deficit present.      Mental Status: She is alert and oriented to person, place, and time.   Psychiatric:         Mood and Affect: Mood normal.         Behavior: Behavior normal.         Thought Content: Thought content normal.          Result Review :   The following data was reviewed by: KAT Willingham on 12/05/2022:      Procedures    Assessment and Plan   Diagnoses and all orders for this visit:    1. Type 2 diabetes mellitus with hyperglycemia, without long-term current use of insulin (HCC) (Primary)  Assessment & Plan:  Poorly " controlled on previous labs, A1c 8.2.  Continue Januvia, Jardiance, metformin.  Will complete patient assistance paperwork and fax as requested.  Patient to work on improving diet as the renovations on her home were recently completed.  She should monitor blood glucose at home and call if consistently elevations or lows. Diabetic eye exam: 11/2022. Diabetic foot exam: 2/2022. Urine microalbumin: 3/2022. Renal protection: ACE. Pneumonia vaccination: Up to date.         Orders:  -     CBC & Differential  -     Comprehensive Metabolic Panel  -     Hemoglobin A1c  -     Lipid Panel    2. Essential hypertension  Assessment & Plan:  Well controlled, continue current medications. Encouraged patient to continue to monitor blood pressure at home and to call or return to clinic with consistent elevations greater than 130/80. Labs in clinic today.        3. Hyperlipidemia, unspecified hyperlipidemia type  Assessment & Plan:  Well controlled, continue statin. Most recent LDL 51. Lipid panel with labs.        4. Hypothyroidism, unspecified type  Assessment & Plan:  Well controlled on previous labs, continue Synthroid. Thyroid profile in clinic today. Will adjust medication based on results of labs, will likely repeat labs in 6 weeks as patient has been off medication x3 months.      Orders:  -     TSH  -     T4, free    5. Gastroesophageal reflux disease, unspecified whether esophagitis present  Assessment & Plan:  Well-controlled, continue esomeprazole.      6. Hot flashes  Assessment & Plan:  Continue Effexor and estradiol, patient aware of risk of HRT.      7. Stage 3a chronic kidney disease (HCC)  Assessment & Plan:  Continue to follow with nephrology as scheduled, most recent GFR 45.7.      8. Vitamin D deficiency  Assessment & Plan:  Continue vitamin D supplement, vitamin D level with labs today.       Orders:  -     Vitamin D,25-Hydroxy    9. Allergic rhinitis, unspecified seasonality, unspecified trigger  Assessment &  Plan:  Continue Zyrtec and Singulair as needed.      10. Brittle nails  Comments:  Mild, 2 nails of left hand.  Potentially related to working with recent home renovations, will check routine labs today.    11. Leg cramping  Comments:  Will check labs today, patient does not feel that symptoms are associated with statin.  Could consider adjusting in the future if warranted.  Orders:  -     CBC & Differential  -     Comprehensive Metabolic Panel  -     Magnesium  -     Vitamin B12 & Folate        Follow Up   Return in about 6 months (around 6/5/2023).  Patient was given instructions and counseling regarding her condition or for health maintenance advice. Please see specific information pulled into the AVS if appropriate.

## 2022-12-05 NOTE — ASSESSMENT & PLAN NOTE
Well controlled on previous labs, continue Synthroid. Thyroid profile in clinic today. Will adjust medication based on results of labs, will likely repeat labs in 6 weeks as patient has been off medication x3 months.

## 2022-12-05 NOTE — ASSESSMENT & PLAN NOTE
Poorly controlled on previous labs, A1c 8.2.  Continue Januvia, Jardiance, metformin.  Will complete patient assistance paperwork and fax as requested.  Patient to work on improving diet as the renovations on her home were recently completed.  She should monitor blood glucose at home and call if consistently elevations or lows. Diabetic eye exam: 11/2022. Diabetic foot exam: 2/2022. Urine microalbumin: 3/2022. Renal protection: ACE. Pneumonia vaccination: Up to date.

## 2022-12-06 DIAGNOSIS — E03.9 HYPOTHYROIDISM, UNSPECIFIED TYPE: Primary | ICD-10-CM

## 2022-12-06 LAB
ALBUMIN SERPL-MCNC: 4.2 G/DL (ref 3.5–5.2)
ALBUMIN/GLOB SERPL: 1.4 G/DL
ALP SERPL-CCNC: 61 U/L (ref 39–117)
ALT SERPL W P-5'-P-CCNC: 13 U/L (ref 1–33)
ANION GAP SERPL CALCULATED.3IONS-SCNC: 14 MMOL/L (ref 5–15)
AST SERPL-CCNC: 16 U/L (ref 1–32)
BILIRUB SERPL-MCNC: <0.2 MG/DL (ref 0–1.2)
BUN SERPL-MCNC: 31 MG/DL (ref 8–23)
BUN/CREAT SERPL: 22 (ref 7–25)
CALCIUM SPEC-SCNC: 9.6 MG/DL (ref 8.6–10.5)
CHLORIDE SERPL-SCNC: 104 MMOL/L (ref 98–107)
CHOLEST SERPL-MCNC: 162 MG/DL (ref 0–200)
CO2 SERPL-SCNC: 22 MMOL/L (ref 22–29)
CREAT SERPL-MCNC: 1.41 MG/DL (ref 0.57–1)
EGFRCR SERPLBLD CKD-EPI 2021: 37.8 ML/MIN/1.73
GLOBULIN UR ELPH-MCNC: 2.9 GM/DL
GLUCOSE SERPL-MCNC: 235 MG/DL (ref 65–99)
HDLC SERPL-MCNC: 76 MG/DL (ref 40–60)
LDLC SERPL CALC-MCNC: 55 MG/DL (ref 0–100)
LDLC/HDLC SERPL: 0.62 {RATIO}
MAGNESIUM SERPL-MCNC: 1.6 MG/DL (ref 1.6–2.4)
POTASSIUM SERPL-SCNC: 5.2 MMOL/L (ref 3.5–5.2)
PROT SERPL-MCNC: 7.1 G/DL (ref 6–8.5)
SODIUM SERPL-SCNC: 140 MMOL/L (ref 136–145)
T4 FREE SERPL-MCNC: 1.12 NG/DL (ref 0.93–1.7)
TRIGL SERPL-MCNC: 195 MG/DL (ref 0–150)
TSH SERPL DL<=0.05 MIU/L-ACNC: 7.67 UIU/ML (ref 0.27–4.2)
VLDLC SERPL-MCNC: 31 MG/DL (ref 5–40)

## 2022-12-07 LAB — HBA1C MFR BLD: 8.5 % (ref 4.8–5.6)

## 2022-12-16 RX ORDER — ESTRADIOL 0.5 MG/1
0.5 TABLET ORAL DAILY
Qty: 90 TABLET | Refills: 1 | Status: SHIPPED | OUTPATIENT
Start: 2022-12-16 | End: 2023-03-20

## 2023-01-09 ENCOUNTER — PRIOR AUTHORIZATION (OUTPATIENT)
Dept: INTERNAL MEDICINE | Facility: CLINIC | Age: 83
End: 2023-01-09
Payer: MEDICARE

## 2023-01-09 NOTE — TELEPHONE ENCOUNTER
Approved. This drug has been approved under the Member's Medicare Part D benefit. Approved quantity: 180 units per 90 day(s). You may fill up to a 90 day supply except for those on Specialty Tier 5, which can be filled up to a 30 day supply. Please call the pharmacy to process the prescription claim.

## 2023-01-13 RX ORDER — ESOMEPRAZOLE MAGNESIUM 40 MG/1
40 CAPSULE, DELAYED RELEASE ORAL 2 TIMES DAILY
Qty: 180 CAPSULE | Refills: 1 | Status: SHIPPED | OUTPATIENT
Start: 2023-01-13

## 2023-01-13 RX ORDER — LEVOTHYROXINE SODIUM 0.05 MG/1
50 TABLET ORAL
Qty: 90 TABLET | Refills: 1 | Status: SHIPPED | OUTPATIENT
Start: 2023-01-13

## 2023-01-24 ENCOUNTER — CLINICAL SUPPORT (OUTPATIENT)
Dept: INTERNAL MEDICINE | Facility: CLINIC | Age: 83
End: 2023-01-24
Payer: MEDICARE

## 2023-01-24 DIAGNOSIS — E03.9 HYPOTHYROIDISM, UNSPECIFIED TYPE: ICD-10-CM

## 2023-01-24 LAB
T4 FREE SERPL-MCNC: 1.14 NG/DL (ref 0.93–1.7)
TSH SERPL DL<=0.05 MIU/L-ACNC: 3.94 UIU/ML (ref 0.27–4.2)

## 2023-01-24 PROCEDURE — 84443 ASSAY THYROID STIM HORMONE: CPT | Performed by: NURSE PRACTITIONER

## 2023-01-24 PROCEDURE — 84439 ASSAY OF FREE THYROXINE: CPT | Performed by: NURSE PRACTITIONER

## 2023-01-27 RX ORDER — ATORVASTATIN CALCIUM 10 MG/1
10 TABLET, FILM COATED ORAL NIGHTLY
Qty: 90 TABLET | Refills: 1 | Status: SHIPPED | OUTPATIENT
Start: 2023-01-27

## 2023-03-02 RX ORDER — MONTELUKAST SODIUM 10 MG/1
10 TABLET ORAL NIGHTLY
Qty: 90 TABLET | Refills: 1 | Status: SHIPPED | OUTPATIENT
Start: 2023-03-02

## 2023-03-09 RX ORDER — CETIRIZINE HYDROCHLORIDE 10 MG/1
10 TABLET ORAL DAILY
Qty: 90 TABLET | Refills: 1 | Status: SHIPPED | OUTPATIENT
Start: 2023-03-09

## 2023-03-20 RX ORDER — ESTRADIOL 0.5 MG/1
0.5 TABLET ORAL DAILY
Qty: 90 TABLET | Refills: 1 | Status: SHIPPED | OUTPATIENT
Start: 2023-03-20

## 2023-04-05 RX ORDER — VENLAFAXINE 75 MG/1
75 TABLET ORAL DAILY
Qty: 90 TABLET | Refills: 1 | Status: SHIPPED | OUTPATIENT
Start: 2023-04-05

## 2023-05-01 ENCOUNTER — LAB (OUTPATIENT)
Dept: LAB | Facility: HOSPITAL | Age: 83
End: 2023-05-01
Payer: MEDICARE

## 2023-05-01 ENCOUNTER — TRANSCRIBE ORDERS (OUTPATIENT)
Dept: ADMINISTRATIVE | Facility: HOSPITAL | Age: 83
End: 2023-05-01
Payer: MEDICARE

## 2023-05-01 DIAGNOSIS — E55.9 VITAMIN D DEFICIENCY: ICD-10-CM

## 2023-05-01 DIAGNOSIS — N18.32 CHRONIC KIDNEY DISEASE (CKD) STAGE G3B/A1, MODERATELY DECREASED GLOMERULAR FILTRATION RATE (GFR) BETWEEN 30-44 ML/MIN/1.73 SQUARE METER AND ALBUMINURIA CREATININE RATIO LESS THAN 30 MG/G (CMS/H*: ICD-10-CM

## 2023-05-01 DIAGNOSIS — N18.32 CHRONIC KIDNEY DISEASE (CKD) STAGE G3B/A1, MODERATELY DECREASED GLOMERULAR FILTRATION RATE (GFR) BETWEEN 30-44 ML/MIN/1.73 SQUARE METER AND ALBUMINURIA CREATININE RATIO LESS THAN 30 MG/G (CMS/H*: Primary | ICD-10-CM

## 2023-05-01 LAB
25(OH)D3 SERPL-MCNC: 46.8 NG/ML (ref 30–100)
ALBUMIN SERPL-MCNC: 4.2 G/DL (ref 3.5–5.2)
ANION GAP SERPL CALCULATED.3IONS-SCNC: 11.6 MMOL/L (ref 5–15)
BASOPHILS # BLD AUTO: 0.04 10*3/MM3 (ref 0–0.2)
BASOPHILS NFR BLD AUTO: 0.9 % (ref 0–1.5)
BILIRUB UR QL STRIP: NEGATIVE
BUN SERPL-MCNC: 24 MG/DL (ref 8–23)
BUN/CREAT SERPL: 18.6 (ref 7–25)
CALCIUM SPEC-SCNC: 9.5 MG/DL (ref 8.6–10.5)
CHLORIDE SERPL-SCNC: 108 MMOL/L (ref 98–107)
CLARITY UR: CLEAR
CO2 SERPL-SCNC: 21.4 MMOL/L (ref 22–29)
COLOR UR: YELLOW
CREAT SERPL-MCNC: 1.29 MG/DL (ref 0.57–1)
CREAT UR-MCNC: 76.8 MG/DL
DEPRECATED RDW RBC AUTO: 43 FL (ref 37–54)
EGFRCR SERPLBLD CKD-EPI 2021: 42 ML/MIN/1.73
EOSINOPHIL # BLD AUTO: 0.24 10*3/MM3 (ref 0–0.4)
EOSINOPHIL NFR BLD AUTO: 5.2 % (ref 0.3–6.2)
ERYTHROCYTE [DISTWIDTH] IN BLOOD BY AUTOMATED COUNT: 12.9 % (ref 12.3–15.4)
GLUCOSE SERPL-MCNC: 209 MG/DL (ref 65–99)
GLUCOSE UR STRIP-MCNC: ABNORMAL MG/DL
HCT VFR BLD AUTO: 39.4 % (ref 34–46.6)
HGB BLD-MCNC: 12.8 G/DL (ref 12–15.9)
HGB UR QL STRIP.AUTO: NEGATIVE
IMM GRANULOCYTES # BLD AUTO: 0.01 10*3/MM3 (ref 0–0.05)
IMM GRANULOCYTES NFR BLD AUTO: 0.2 % (ref 0–0.5)
KETONES UR QL STRIP: NEGATIVE
LEUKOCYTE ESTERASE UR QL STRIP.AUTO: NEGATIVE
LYMPHOCYTES # BLD AUTO: 1.2 10*3/MM3 (ref 0.7–3.1)
LYMPHOCYTES NFR BLD AUTO: 26.2 % (ref 19.6–45.3)
MCH RBC QN AUTO: 29.6 PG (ref 26.6–33)
MCHC RBC AUTO-ENTMCNC: 32.5 G/DL (ref 31.5–35.7)
MCV RBC AUTO: 91.2 FL (ref 79–97)
MONOCYTES # BLD AUTO: 0.47 10*3/MM3 (ref 0.1–0.9)
MONOCYTES NFR BLD AUTO: 10.3 % (ref 5–12)
NEUTROPHILS NFR BLD AUTO: 2.62 10*3/MM3 (ref 1.7–7)
NEUTROPHILS NFR BLD AUTO: 57.2 % (ref 42.7–76)
NITRITE UR QL STRIP: NEGATIVE
NRBC BLD AUTO-RTO: 0 /100 WBC (ref 0–0.2)
PH UR STRIP.AUTO: 6 [PH] (ref 5–8)
PHOSPHATE SERPL-MCNC: 2.9 MG/DL (ref 2.5–4.5)
PLATELET # BLD AUTO: 233 10*3/MM3 (ref 140–450)
PMV BLD AUTO: 10.7 FL (ref 6–12)
POTASSIUM SERPL-SCNC: 5.2 MMOL/L (ref 3.5–5.2)
PROT ?TM UR-MCNC: 10 MG/DL
PROT UR QL STRIP: NEGATIVE
PROT/CREAT UR: 0.13 MG/G{CREAT}
PTH-INTACT SERPL-MCNC: 33.1 PG/ML (ref 15–65)
RBC # BLD AUTO: 4.32 10*6/MM3 (ref 3.77–5.28)
SODIUM SERPL-SCNC: 141 MMOL/L (ref 136–145)
SP GR UR STRIP: 1.02 (ref 1–1.03)
UROBILINOGEN UR QL STRIP: ABNORMAL
WBC NRBC COR # BLD: 4.58 10*3/MM3 (ref 3.4–10.8)

## 2023-05-01 PROCEDURE — 82570 ASSAY OF URINE CREATININE: CPT

## 2023-05-01 PROCEDURE — 36415 COLL VENOUS BLD VENIPUNCTURE: CPT

## 2023-05-01 PROCEDURE — 81003 URINALYSIS AUTO W/O SCOPE: CPT

## 2023-05-01 PROCEDURE — 84156 ASSAY OF PROTEIN URINE: CPT

## 2023-05-01 PROCEDURE — 85025 COMPLETE CBC W/AUTO DIFF WBC: CPT

## 2023-05-01 PROCEDURE — 80069 RENAL FUNCTION PANEL: CPT

## 2023-05-01 PROCEDURE — 83970 ASSAY OF PARATHORMONE: CPT

## 2023-05-01 PROCEDURE — 82306 VITAMIN D 25 HYDROXY: CPT

## 2023-06-06 ENCOUNTER — OFFICE VISIT (OUTPATIENT)
Dept: INTERNAL MEDICINE | Facility: CLINIC | Age: 83
End: 2023-06-06
Payer: MEDICARE

## 2023-06-06 ENCOUNTER — TELEPHONE (OUTPATIENT)
Dept: INTERNAL MEDICINE | Facility: CLINIC | Age: 83
End: 2023-06-06

## 2023-06-06 VITALS
DIASTOLIC BLOOD PRESSURE: 64 MMHG | BODY MASS INDEX: 31.54 KG/M2 | OXYGEN SATURATION: 95 % | SYSTOLIC BLOOD PRESSURE: 128 MMHG | WEIGHT: 178 LBS | HEIGHT: 63 IN | HEART RATE: 91 BPM | TEMPERATURE: 98.4 F

## 2023-06-06 DIAGNOSIS — J30.9 ALLERGIC RHINITIS, UNSPECIFIED SEASONALITY, UNSPECIFIED TRIGGER: ICD-10-CM

## 2023-06-06 DIAGNOSIS — E78.5 HYPERLIPIDEMIA, UNSPECIFIED HYPERLIPIDEMIA TYPE: ICD-10-CM

## 2023-06-06 DIAGNOSIS — Z12.31 VISIT FOR SCREENING MAMMOGRAM: ICD-10-CM

## 2023-06-06 DIAGNOSIS — K21.9 GASTROESOPHAGEAL REFLUX DISEASE, UNSPECIFIED WHETHER ESOPHAGITIS PRESENT: ICD-10-CM

## 2023-06-06 DIAGNOSIS — E11.65 TYPE 2 DIABETES MELLITUS WITH HYPERGLYCEMIA, WITHOUT LONG-TERM CURRENT USE OF INSULIN: ICD-10-CM

## 2023-06-06 DIAGNOSIS — E55.9 VITAMIN D DEFICIENCY: ICD-10-CM

## 2023-06-06 DIAGNOSIS — I10 ESSENTIAL HYPERTENSION: ICD-10-CM

## 2023-06-06 DIAGNOSIS — N18.31 STAGE 3A CHRONIC KIDNEY DISEASE: ICD-10-CM

## 2023-06-06 DIAGNOSIS — R23.2 HOT FLASHES: ICD-10-CM

## 2023-06-06 DIAGNOSIS — Z00.00 MEDICARE ANNUAL WELLNESS VISIT, SUBSEQUENT: Primary | ICD-10-CM

## 2023-06-06 DIAGNOSIS — E03.9 HYPOTHYROIDISM, UNSPECIFIED TYPE: ICD-10-CM

## 2023-06-06 LAB
25(OH)D3 SERPL-MCNC: 46.7 NG/ML (ref 30–100)
ALBUMIN SERPL-MCNC: 4.4 G/DL (ref 3.5–5.2)
ALBUMIN/GLOB SERPL: 1.6 G/DL
ALP SERPL-CCNC: 55 U/L (ref 39–117)
ALT SERPL W P-5'-P-CCNC: 14 U/L (ref 1–33)
ANION GAP SERPL CALCULATED.3IONS-SCNC: 9.6 MMOL/L (ref 5–15)
AST SERPL-CCNC: 21 U/L (ref 1–32)
BASOPHILS # BLD AUTO: 0.06 10*3/MM3 (ref 0–0.2)
BASOPHILS NFR BLD AUTO: 0.9 % (ref 0–1.5)
BILIRUB SERPL-MCNC: 0.4 MG/DL (ref 0–1.2)
BUN SERPL-MCNC: 27 MG/DL (ref 8–23)
BUN/CREAT SERPL: 21.8 (ref 7–25)
CALCIUM SPEC-SCNC: 9.9 MG/DL (ref 8.6–10.5)
CHLORIDE SERPL-SCNC: 108 MMOL/L (ref 98–107)
CHOLEST SERPL-MCNC: 172 MG/DL (ref 0–200)
CO2 SERPL-SCNC: 23.4 MMOL/L (ref 22–29)
CREAT SERPL-MCNC: 1.24 MG/DL (ref 0.57–1)
DEPRECATED RDW RBC AUTO: 39.1 FL (ref 37–54)
EGFRCR SERPLBLD CKD-EPI 2021: 44.1 ML/MIN/1.73
EOSINOPHIL # BLD AUTO: 0.32 10*3/MM3 (ref 0–0.4)
EOSINOPHIL NFR BLD AUTO: 4.8 % (ref 0.3–6.2)
ERYTHROCYTE [DISTWIDTH] IN BLOOD BY AUTOMATED COUNT: 11.9 % (ref 12.3–15.4)
GLOBULIN UR ELPH-MCNC: 2.7 GM/DL
GLUCOSE SERPL-MCNC: 146 MG/DL (ref 65–99)
HCT VFR BLD AUTO: 39.8 % (ref 34–46.6)
HDLC SERPL-MCNC: 71 MG/DL (ref 40–60)
HGB BLD-MCNC: 13.2 G/DL (ref 12–15.9)
IMM GRANULOCYTES # BLD AUTO: 0.01 10*3/MM3 (ref 0–0.05)
IMM GRANULOCYTES NFR BLD AUTO: 0.2 % (ref 0–0.5)
LDLC SERPL CALC-MCNC: 76 MG/DL (ref 0–100)
LDLC/HDLC SERPL: 1 {RATIO}
LYMPHOCYTES # BLD AUTO: 1.33 10*3/MM3 (ref 0.7–3.1)
LYMPHOCYTES NFR BLD AUTO: 20.2 % (ref 19.6–45.3)
MCH RBC QN AUTO: 29.9 PG (ref 26.6–33)
MCHC RBC AUTO-ENTMCNC: 33.2 G/DL (ref 31.5–35.7)
MCV RBC AUTO: 90 FL (ref 79–97)
MONOCYTES # BLD AUTO: 0.53 10*3/MM3 (ref 0.1–0.9)
MONOCYTES NFR BLD AUTO: 8 % (ref 5–12)
NEUTROPHILS NFR BLD AUTO: 4.35 10*3/MM3 (ref 1.7–7)
NEUTROPHILS NFR BLD AUTO: 65.9 % (ref 42.7–76)
NRBC BLD AUTO-RTO: 0 /100 WBC (ref 0–0.2)
PLATELET # BLD AUTO: 259 10*3/MM3 (ref 140–450)
PMV BLD AUTO: 11 FL (ref 6–12)
POTASSIUM SERPL-SCNC: 5.6 MMOL/L (ref 3.5–5.2)
PROT SERPL-MCNC: 7.1 G/DL (ref 6–8.5)
RBC # BLD AUTO: 4.42 10*6/MM3 (ref 3.77–5.28)
SODIUM SERPL-SCNC: 141 MMOL/L (ref 136–145)
T4 FREE SERPL-MCNC: 1.15 NG/DL (ref 0.93–1.7)
TRIGL SERPL-MCNC: 151 MG/DL (ref 0–150)
TSH SERPL DL<=0.05 MIU/L-ACNC: 1.41 UIU/ML (ref 0.27–4.2)
VLDLC SERPL-MCNC: 25 MG/DL (ref 5–40)
WBC NRBC COR # BLD: 6.6 10*3/MM3 (ref 3.4–10.8)

## 2023-06-06 PROCEDURE — 80061 LIPID PANEL: CPT | Performed by: NURSE PRACTITIONER

## 2023-06-06 PROCEDURE — 82306 VITAMIN D 25 HYDROXY: CPT | Performed by: NURSE PRACTITIONER

## 2023-06-06 PROCEDURE — 85025 COMPLETE CBC W/AUTO DIFF WBC: CPT | Performed by: NURSE PRACTITIONER

## 2023-06-06 PROCEDURE — 80053 COMPREHEN METABOLIC PANEL: CPT | Performed by: NURSE PRACTITIONER

## 2023-06-06 PROCEDURE — 83036 HEMOGLOBIN GLYCOSYLATED A1C: CPT | Performed by: NURSE PRACTITIONER

## 2023-06-06 PROCEDURE — 84443 ASSAY THYROID STIM HORMONE: CPT | Performed by: NURSE PRACTITIONER

## 2023-06-06 PROCEDURE — 84439 ASSAY OF FREE THYROXINE: CPT | Performed by: NURSE PRACTITIONER

## 2023-06-06 RX ORDER — EMPAGLIFLOZIN 25 MG/1
25 TABLET, FILM COATED ORAL DAILY
Qty: 90 TABLET | Refills: 1 | Status: SHIPPED | OUTPATIENT
Start: 2023-06-06

## 2023-06-06 RX ORDER — EMPAGLIFLOZIN 25 MG/1
25 TABLET, FILM COATED ORAL DAILY
Qty: 90 TABLET | Refills: 1 | Status: SHIPPED | OUTPATIENT
Start: 2023-06-06 | End: 2023-06-06 | Stop reason: SDUPTHER

## 2023-06-06 RX ORDER — LISINOPRIL 20 MG/1
20 TABLET ORAL DAILY
Qty: 90 TABLET | Refills: 1 | Status: SHIPPED | OUTPATIENT
Start: 2023-06-06

## 2023-06-06 NOTE — PROGRESS NOTES
The ABCs of the Annual Wellness Visit  Subsequent Medicare Wellness Visit    Subjective    Griselda Henderson is a 82 y.o. female who presents for a Subsequent Medicare Wellness Visit.    The following portions of the patient's history were reviewed and   updated as appropriate: allergies, current medications, past family history, past medical history, past social history, past surgical history, and problem list.    Compared to one year ago, the patient feels her physical   health is the same.    Compared to one year ago, the patient feels her mental   health is the same.    Recent Hospitalizations:  She was not admitted to the hospital during the last year.       Current Medical Providers:  Patient Care Team:  Dory Ca APRN as PCP - General (Nurse Practitioner)    Outpatient Medications Prior to Visit   Medication Sig Dispense Refill    Aspirin Buf,CaCarb-MgCarb-MgO, 81 MG tablet Take 81 mg by mouth Daily.      atorvastatin (LIPITOR) 10 MG tablet TAKE 1 TABLET BY MOUTH EVERY NIGHT 90 tablet 1    cetirizine (zyrTEC) 10 MG tablet Take 1 tablet by mouth Daily. 90 tablet 1    Cholecalciferol (Vitamin D) 50 MCG (2000 UT) tablet Take 1 tablet by mouth Daily.      Diclofenac Sodium (VOLTAREN) 1 % gel gel APPLY TOPICALLY TO THE AFFECTED AREA FOUR TIMES DAILY AS DIRECTED 100 g 1    esomeprazole (nexIUM) 40 MG capsule Take 1 capsule by mouth 2 (Two) Times a Day. 180 capsule 1    estradiol (ESTRACE) 0.5 MG tablet TAKE 1 TABLET BY MOUTH DAILY 90 tablet 1    glucose blood test strip Test blood glucose levels four times daily; Contour Next Test Strips 100 each 3    levothyroxine (SYNTHROID, LEVOTHROID) 50 MCG tablet Take 1 tablet by mouth Every Morning. 90 tablet 1    metFORMIN (GLUCOPHAGE) 1000 MG tablet TAKE 1 TABLET BY MOUTH TWICE DAILY 180 tablet 1    montelukast (SINGULAIR) 10 MG tablet TAKE 1 TABLET BY MOUTH EVERY NIGHT 90 tablet 1    multivitamin (THERAGRAN) tablet tablet Take 1 tablet by mouth.      venlafaxine  "(EFFEXOR) 75 MG tablet TAKE 1 TABLET BY MOUTH DAILY 90 tablet 1    Jardiance 25 MG tablet tablet Take 1 tablet by mouth Daily. 90 tablet 1    lisinopril (PRINIVIL,ZESTRIL) 20 MG tablet Take 1 tablet by mouth Daily.      SITagliptin (Januvia) 100 MG tablet Take 1 tablet by mouth Daily for 90 days. 90 tablet 1     No facility-administered medications prior to visit.       No opioid medication identified on active medication list. I have reviewed chart for other potential  high risk medication/s and harmful drug interactions in the elderly.        Aspirin is on active medication list. Aspirin use is indicated based on review of current medical condition/s. Pros and cons of this therapy have been discussed today. Benefits of this medication outweigh potential harm.  Patient has been encouraged to continue taking this medication.  .      Patient Active Problem List   Diagnosis    Type 2 diabetes mellitus with hyperglycemia, without long-term current use of insulin    Vitamin D deficiency    Hyperlipidemia    Allergic rhinitis    Stage 3b chronic kidney disease    Hypothyroidism    Hot flashes    Essential hypertension    Gastroesophageal reflux disease    Stage 3a chronic kidney disease    Postmenopausal    Abdominal hernia    Basal cell carcinoma    Cataract    Deafness    Heart murmur    Hemorrhoid    Hyperkalemia    Lumbago with sciatica    Scoliosis of lumbar spine    Neck pain    Night sweats    Medicare annual wellness visit, subsequent     Advance Care Planning   Advance Care Planning     Advance Directive is not on file.  ACP discussion was held with the patient during this visit. Patient has an advance directive (not in EMR), copy requested.     Objective    Vitals:    06/06/23 1258   BP: 128/64   Pulse: 91   Temp: 98.4 °F (36.9 °C)   TempSrc: Temporal   SpO2: 95%   Weight: 80.7 kg (178 lb)   Height: 160 cm (63\")     Estimated body mass index is 31.53 kg/m² as calculated from the following:    Height as of this " "encounter: 160 cm (63\").    Weight as of this encounter: 80.7 kg (178 lb).    BMI is >= 30 and <35. (Class 1 Obesity). The following options were offered after discussion;: exercise counseling/recommendations and nutrition counseling/recommendations      Does the patient have evidence of cognitive impairment? No          HEALTH RISK ASSESSMENT    Smoking Status:  Social History     Tobacco Use   Smoking Status Never   Smokeless Tobacco Never     Alcohol Consumption:  Social History     Substance and Sexual Activity   Alcohol Use Never     Fall Risk Screen:    STEADI Fall Risk Assessment was completed, and patient is at LOW risk for falls.Assessment completed on:2023    Depression Screenin/6/2023    12:57 PM   PHQ-2/PHQ-9 Depression Screening   Little Interest or Pleasure in Doing Things 0-->not at all   Feeling Down, Depressed or Hopeless 0-->not at all   PHQ-9: Brief Depression Severity Measure Score 0       Health Habits and Functional and Cognitive Screenin/6/2023     1:00 PM   Functional & Cognitive Status   Do you have difficulty preparing food and eating? No   Do you have difficulty bathing yourself, getting dressed or grooming yourself? No   Do you have difficulty using the toilet? No   Do you have difficulty moving around from place to place? No   Do you have trouble with steps or getting out of a bed or a chair? Yes   Current Diet Frequent Junk Food   Dental Exam Up to date        Dental Exam Comment    Eye Exam Up to date   Exercise (times per week) 5 times per week        Exercise Comment works outside   Do you need help using the phone?  No   Are you deaf or do you have serious difficulty hearing?  No   Do you need help with transportation? No   Do you need help shopping? No   Do you need help preparing meals?  No   Do you need help with housework?  No   Do you need help with laundry? No   Do you need help taking your medications? No   Do you need help managing money? No   Do " you ever drive or ride in a car without wearing a seat belt? No   Have you felt unusual stress, anger or loneliness in the last month? No   Who do you live with? Spouse   If you need help, do you have trouble finding someone available to you? No   Have you been bothered in the last four weeks by sexual problems? No   Do you have difficulty concentrating, remembering or making decisions? No       Age-appropriate Screening Schedule:  Refer to the list below for future screening recommendations based on patient's age, sex and/or medical conditions. Orders for these recommended tests are listed in the plan section. The patient has been provided with a written plan.    Health Maintenance   Topic Date Due    DIABETIC EYE EXAM  09/16/2021    HEMOGLOBIN A1C  06/05/2023    COVID-19 Vaccine (7 - Moderna series) 06/08/2023 (Originally 3/10/2023)    TDAP/TD VACCINES (1 - Tdap) 06/06/2024 (Originally 10/19/1959)    INFLUENZA VACCINE  08/01/2023    LIPID PANEL  12/05/2023    URINE MICROALBUMIN  05/01/2024    ANNUAL WELLNESS VISIT  06/06/2024    DXA SCAN  06/14/2024    Pneumococcal Vaccine 65+  Completed    ZOSTER VACCINE  Completed                  CMS Preventative Services Quick Reference  Risk Factors Identified During Encounter  Immunizations Discussed/Encouraged: Prevnar 20 (Pneumococcal 20-valent conjugate)  The above risks/problems have been discussed with the patient.  Pertinent information has been shared with the patient in the After Visit Summary.  An After Visit Summary and PPPS were made available to the patient.    Follow Up:   Next Medicare Wellness visit to be scheduled in 1 year.       Additional E&M Note during same encounter follows:  Patient has multiple medical problems which are significant and separately identifiable that require additional work above and beyond the Medicare Wellness Visit.      Chief Complaint  Diabetes (6 month follow up), Hypertension, and Hypothyroidism    Subjective        HPI  Griselda VENTURA  Beatriz is also being seen today for diabetes follow up.    GERD-  States she had a few days of breakthrough symptoms with vomiting, not sure what set it off but states symptoms have since resolved. She continues esomeprazole.     HLD-  Managed with statin.  Patient admits to muscle cramps, typically seems to be when she works outside. Attributes to activity. Most recent LDL 55.     DM2-  Currently managed with Januvia, Jardiance, Metformin.  She does not check her blood sugar at home. States she has started to cook more now that they're moved into their new home. Denies lows.  Most recent A1c 8.5, patient is not interested in injectable medication.  States she knows she is only one who can improve her A1c, reports nighttime snacking. Diabetic eye exam: 11/2022, [facility].  Diabetic foot exam: 2/2022, due. Urine microalbumin: 5/2023. Renal protection: ACE. Pneumonia vaccination: Would like.      Hot flashes-  Currently managed with Effexor and estradiol. Patient  Risk of HRT has been discussed in detail in the past, patient is not interested in discontinuing medication at this time.      CKD-  Most recent GFR 42, follows up every four months with nephrology.     Hypothyroid-  Managed with Synthroid, well controlled on previous labs.  Patient states she has been taking medication consistently.     HTN-  Managed with lisinopril. She has not been checking her blood pressure at home. Denies chest pain, headache, blurry vision, leg swelling.     Vitamin D deficiency-  Managed with oral supplement.     Allergic rhinitis-  Managed with Zyrtec and Singulair, states her symptoms have been bad this year. Will take a [facility] OTC medication when it gets really bad but feels that things are starting to improve.     Shingles vaccination: Up to date  COVID-19 vaccination: Up to date  Pneumonia vaccination: Agreeable  Mammogram: 6/2022, due  DEXA: 6/2022, due 2024              Objective   Vital Signs:  /64   Pulse 91  "  Temp 98.4 °F (36.9 °C) (Temporal)   Ht 160 cm (63\")   Wt 80.7 kg (178 lb)   SpO2 95%   BMI 31.53 kg/m²     Physical Exam  Constitutional:       Appearance: Normal appearance.   HENT:      Head: Normocephalic and atraumatic.      Nose: Nose normal.      Mouth/Throat:      Mouth: Mucous membranes are moist.      Pharynx: Oropharynx is clear.   Eyes:      Extraocular Movements: Extraocular movements intact.      Conjunctiva/sclera: Conjunctivae normal.      Pupils: Pupils are equal, round, and reactive to light.   Neck:      Thyroid: No thyroid mass, thyromegaly or thyroid tenderness.   Cardiovascular:      Rate and Rhythm: Normal rate and regular rhythm.      Heart sounds: Normal heart sounds.   Pulmonary:      Effort: Pulmonary effort is normal.      Breath sounds: Normal breath sounds.   Skin:     General: Skin is warm and dry.   Neurological:      General: No focal deficit present.      Mental Status: She is alert and oriented to person, place, and time.   Psychiatric:         Mood and Affect: Mood normal.         Behavior: Behavior normal.         Thought Content: Thought content normal.                       Assessment and Plan   Diagnoses and all orders for this visit:    1. Medicare annual wellness visit, subsequent (Primary)  Assessment & Plan:  Basic labs in clinic today. Encouraged routine dental and eye exams. Discussed age appropriate immunizations, screenings, nutrition and exercise. Age appropriate handout provided.        2. Type 2 diabetes mellitus with hyperglycemia, without long-term current use of insulin  Assessment & Plan:  Poorly controlled on recent labs, A1c 8.5.  Continue Januvia, Jardiance, metformin.  She is not interested in GLP-1.  Patient should monitor blood glucose levels closely and call or return to clinic with consistent elevations or frequent lows. Repeat labs in clinic today. Diabetic eye exam: 11/2022, [facility], records requested.  Diabetic foot exam: Today. Urine " microalbumin: 5/2023. Renal protection: ACE. Pneumonia vaccination: Today.       Orders:  -     CBC & Differential  -     Comprehensive Metabolic Panel  -     Hemoglobin A1c  -     Lipid Panel    3. Hypothyroidism, unspecified type  Assessment & Plan:  Well controlled on previous labs, continue Synthroid. Thyroid profile in clinic today. Will adjust medication based on results of labs.      Orders:  -     TSH  -     T4, Free    4. Essential hypertension  Assessment & Plan:  Well controlled, continue lisinopril. Patient should monitor blood pressure at home and call or return to clinic with consistent elevations greater than 130/80. Labs in clinic today.        5. Hyperlipidemia, unspecified hyperlipidemia type  Assessment & Plan:  Well controlled, continue statin. Most recent LDL 55. Lipid panel with labs.        6. Gastroesophageal reflux disease, unspecified whether esophagitis present  Assessment & Plan:  Continue PPI.  Will continue to monitor.  Could consider medication adjustment and/or EGD in the future if symptoms worsen or persist.      7. Hot flashes  Assessment & Plan:  Patient aware of the risks associated with HRT, continue estradiol and Effexor.      8. Stage 3a chronic kidney disease  Assessment & Plan:  GFR 42, continue to follow with nephrology as scheduled.  CMP today.    Orders:  -     Comprehensive Metabolic Panel    9. Vitamin D deficiency  Assessment & Plan:  Continue vitamin D supplement, vitamin D level with labs today.       Orders:  -     Vitamin D 25 hydroxy    10. Visit for screening mammogram  Comments:  Mammogram ordered.  Orders:  -     Mammo Screening Digital Tomosynthesis Bilateral With CAD; Future    11. Allergic rhinitis, unspecified seasonality, unspecified trigger  Assessment & Plan:  Continue Zyrtec and Singulair, patient defers nasal spray.      Other orders  -     lisinopril (PRINIVIL,ZESTRIL) 20 MG tablet; Take 1 tablet by mouth Daily.  Dispense: 90 tablet; Refill: 1  -      Discontinue: Jardiance 25 MG tablet tablet; Take 1 tablet by mouth Daily.  Dispense: 90 tablet; Refill: 1  -     SITagliptin (Januvia) 100 MG tablet; Take 1 tablet by mouth Daily for 180 days.  Dispense: 90 tablet; Refill: 1  -     Jardiance 25 MG tablet tablet; Take 1 tablet by mouth Daily.  Dispense: 90 tablet; Refill: 1  -     Pneumococcal Conjugate Vaccine 20-Valent (PCV20)             Follow Up   Return in about 6 months (around 12/6/2023).  Patient was given instructions and counseling regarding her condition or for health maintenance advice. Please see specific information pulled into the AVS if appropriate.

## 2023-06-06 NOTE — ASSESSMENT & PLAN NOTE
Poorly controlled on recent labs, A1c 8.5.  Continue Januvia, Jardiance, metformin.  She is not interested in GLP-1.  Patient should monitor blood glucose levels closely and call or return to clinic with consistent elevations or frequent lows. Repeat labs in clinic today. Diabetic eye exam: 11/2022, [facility], records requested.  Diabetic foot exam: Today. Urine microalbumin: 5/2023. Renal protection: ACE. Pneumonia vaccination: Today.

## 2023-06-06 NOTE — ASSESSMENT & PLAN NOTE
Continue PPI.  Will continue to monitor.  Could consider medication adjustment and/or EGD in the future if symptoms worsen or persist.

## 2023-06-06 NOTE — ASSESSMENT & PLAN NOTE
Well controlled, continue lisinopril. Patient should monitor blood pressure at home and call or return to clinic with consistent elevations greater than 130/80. Labs in clinic today.

## 2023-06-07 DIAGNOSIS — E87.5 HYPERKALEMIA: Primary | ICD-10-CM

## 2023-06-07 LAB — HBA1C MFR BLD: 8.4 % (ref 4.8–5.6)

## 2023-07-20 RX ORDER — VENLAFAXINE 75 MG/1
75 TABLET ORAL DAILY
Qty: 90 TABLET | Refills: 1 | Status: CANCELLED | OUTPATIENT
Start: 2023-07-20

## 2023-07-25 RX ORDER — ATORVASTATIN CALCIUM 10 MG/1
10 TABLET, FILM COATED ORAL NIGHTLY
Qty: 90 TABLET | Refills: 1 | Status: SHIPPED | OUTPATIENT
Start: 2023-07-25

## 2023-09-05 RX ORDER — MONTELUKAST SODIUM 10 MG/1
10 TABLET ORAL NIGHTLY
Qty: 90 TABLET | Refills: 1 | Status: SHIPPED | OUTPATIENT
Start: 2023-09-05

## 2023-09-06 RX ORDER — CETIRIZINE HYDROCHLORIDE 10 MG/1
10 TABLET ORAL DAILY
Qty: 90 TABLET | Refills: 1 | Status: SHIPPED | OUTPATIENT
Start: 2023-09-06

## 2023-09-11 RX ORDER — EMPAGLIFLOZIN 25 MG/1
25 TABLET, FILM COATED ORAL DAILY
Qty: 90 TABLET | Refills: 1 | Status: SHIPPED | OUTPATIENT
Start: 2023-09-11

## 2023-09-12 RX ORDER — EMPAGLIFLOZIN 25 MG/1
25 TABLET, FILM COATED ORAL DAILY
Qty: 90 TABLET | Refills: 1 | Status: CANCELLED | OUTPATIENT
Start: 2023-09-12

## 2023-09-19 ENCOUNTER — HOSPITAL ENCOUNTER (EMERGENCY)
Facility: HOSPITAL | Age: 83
Discharge: HOME | End: 2023-09-19
Attending: EMERGENCY MEDICINE | Admitting: EMERGENCY MEDICINE

## 2023-09-19 ENCOUNTER — APPOINTMENT (OUTPATIENT)
Dept: GENERAL RADIOLOGY | Facility: HOSPITAL | Age: 83
End: 2023-09-19

## 2023-09-19 ENCOUNTER — APPOINTMENT (OUTPATIENT)
Dept: CT IMAGING | Facility: HOSPITAL | Age: 83
End: 2023-09-19

## 2023-09-19 VITALS
RESPIRATION RATE: 18 BRPM | HEIGHT: 63 IN | OXYGEN SATURATION: 93 % | DIASTOLIC BLOOD PRESSURE: 79 MMHG | WEIGHT: 175 LBS | BODY MASS INDEX: 31.01 KG/M2 | TEMPERATURE: 97.9 F | SYSTOLIC BLOOD PRESSURE: 135 MMHG | HEART RATE: 74 BPM

## 2023-09-19 DIAGNOSIS — S16.1XXA STRAIN OF NECK MUSCLE, INITIAL ENCOUNTER: Primary | ICD-10-CM

## 2023-09-19 DIAGNOSIS — S63.601A SPRAIN OF RIGHT THUMB, UNSPECIFIED SITE OF DIGIT, INITIAL ENCOUNTER: ICD-10-CM

## 2023-09-19 PROCEDURE — 72125 CT NECK SPINE W/O DYE: CPT

## 2023-09-19 PROCEDURE — 63710000001 ONDANSETRON ODT 4 MG TABLET DISPERSIBLE: Performed by: EMERGENCY MEDICINE

## 2023-09-19 PROCEDURE — 73130 X-RAY EXAM OF HAND: CPT

## 2023-09-19 PROCEDURE — 70450 CT HEAD/BRAIN W/O DYE: CPT

## 2023-09-19 PROCEDURE — 99284 EMERGENCY DEPT VISIT MOD MDM: CPT

## 2023-09-19 RX ORDER — ONDANSETRON 4 MG/1
4 TABLET, ORALLY DISINTEGRATING ORAL ONCE
Status: COMPLETED | OUTPATIENT
Start: 2023-09-19 | End: 2023-09-19

## 2023-09-19 RX ORDER — HYDROCODONE BITARTRATE AND ACETAMINOPHEN 7.5; 325 MG/1; MG/1
1 TABLET ORAL ONCE
Status: COMPLETED | OUTPATIENT
Start: 2023-09-19 | End: 2023-09-19

## 2023-09-19 RX ORDER — CYCLOBENZAPRINE HCL 10 MG
10 TABLET ORAL 3 TIMES DAILY PRN
Qty: 21 TABLET | Refills: 0 | Status: SHIPPED | OUTPATIENT
Start: 2023-09-19

## 2023-09-19 RX ADMIN — HYDROCODONE BITARTRATE AND ACETAMINOPHEN 1 TABLET: 7.5; 325 TABLET ORAL at 15:37

## 2023-09-19 RX ADMIN — ONDANSETRON 4 MG: 4 TABLET, ORALLY DISINTEGRATING ORAL at 15:37

## 2023-09-19 NOTE — ED PROVIDER NOTES
"The NIKITA and I have discussed this patient's history, physical exam, and treatment plan.  I have reviewed the documentation and personally had a face to face interaction with the patient. I affirm the documentation and agree with the treatment and plan.  The attached note describes my personal findings.      I provided a substantive portion of the care of the patient.  I personally performed the physical exam in its entirety, and below are my findings.     Brief history of present illness: 82-year-old female complaining of right thumb discomfort after playing with her granddaughter.  She has been putting diclofenac gel on it and has a splint at home though she has not yet used it.  She went to make sure its not broken.  She also awakened with some neck discomfort yesterday morning but denies any radicular symptoms or focal weakness.    Physical exam:    /79 (BP Location: Left arm, Patient Position: Sitting)   Pulse 74   Temp 97.9 °F (36.6 °C) (Tympanic)   Resp 18   Ht 160 cm (63\")   Wt 79.4 kg (175 lb)   SpO2 93%   BMI 31.00 kg/m²       Physical Exam   Constitutional: No distress.  Nontoxic  HENT:  Head: Normocephalic and atraumatic.   Oropharynx: Mucous membranes are moist.   Eyes: . No scleral icterus.   Neck: Normal range of motion. Neck supple.  No meningismus appreciated.  Cardiovascular: Pink warm and well perfused throughout.    Pulmonary/Chest: No respiratory distress.    Abdominal: Soft. There is no rebound or rigidity  Musculoskeletal: Moves all extremities equally.  Small ecchymosis over the right first metacarpal with full range of motion intact.  Neurological: Alert and oriented.  Speech fluent and easily intelligible  Skin: Skin is pink, warm, and dry.   Psychiatric: Mood and affect normal.   Nursing note and vitals reviewed.        MDM: Agree with planned radiologic evaluation and symptomatic treatment.    Please note that portions of this were completed with a voice recognition program. "       Note Disclaimer: At [facility], we believe that sharing information builds trust and better relationships. You are receiving this note because you are receiving care at [facility] or recently visited. It is possible you will see health information before a provider has talked with you about it. This kind of information can be easy to misunderstand. To help you fully understand what it means for your health, we urge you to discuss this note with your provider.     Nathan Lind MD  09/19/23 1616

## 2023-09-19 NOTE — ED TRIAGE NOTES
Pt c/o bilateral neck pain that started when she woke Monday morning. Pain radiates into head and into shoulders.

## 2023-09-19 NOTE — ED PROVIDER NOTES
EMERGENCY DEPARTMENT ENCOUNTER    Room Number:  S05/05  Date of encounter:  9/19/2023  PCP: Dory Ca APRN  Historian: Patient, spouse  Chronic or social conditions impacting care (social determinants of health): Nothing    HPI:  Chief Complaint: Neck pain, right hand pain  A complete HPI/ROS/PMH/PSH/SH/FH are unobtainable due to: Nothing    Context: Griselda Henderson is a 82 y.o. female who presents to the ED c/o 2 separate complaints.  Patient believes she injured her right thumb while horse playing with her granddaughter 2 nights ago.  She is unsure of any exact mechanism.  She woke yesterday with bruising to her right thumb.  The pain is localized to the first MCP joint.  She states the pain is actually better today.    In addition patient complains of severe posterior neck pain.  Yesterday morning.  The pain seems to radiate from the bilateral shoulders through the neck and up to the posterior scalp.  The pain is worse with movement.  She has decreased range of motion of her neck.  She did have some nausea this morning.  She denies any nausea, vomiting, light sensitivity.  She reports having mild, chronic neck pain however this pain is much more severe.  She is unaware of any specific injuries.  The pain started    Review of prior external notes (non-ED):   I reviewed family medicine office visit from 6/6/2023.  Patient being followed for diabetes, hypertension, hyperlipidemia, chronic kidney disease    Review of prior external test results outside of this encounter:  I reviewed a CMP from 6/6/2023.  Creatinine 1.24, potassium 5.6    PAST MEDICAL HISTORY  Active Ambulatory Problems     Diagnosis Date Noted    Type 2 diabetes mellitus with hyperglycemia, without long-term current use of insulin 08/24/2021    Vitamin D deficiency 08/24/2021    Hyperlipidemia 08/24/2021    Allergic rhinitis 08/24/2021    Stage 3b chronic kidney disease 08/24/2021    Hypothyroidism 08/24/2021    Hot flashes 08/24/2021     Essential hypertension 11/29/2021    Gastroesophageal reflux disease 11/29/2021    Stage 3a chronic kidney disease 06/01/2022    Postmenopausal 06/01/2022    Abdominal hernia 12/05/2022    Basal cell carcinoma 12/05/2022    Cataract 12/05/2022    Deafness 12/05/2022    Heart murmur 12/05/2022    Hemorrhoid 12/05/2022    Hyperkalemia 11/04/2022    Lumbago with sciatica 10/26/2017    Scoliosis of lumbar spine 01/29/2018    Neck pain 10/26/2017    Night sweats 12/05/2022    Medicare annual wellness visit, subsequent 06/06/2023     Resolved Ambulatory Problems     Diagnosis Date Noted    Elevated blood pressure reading in office without diagnosis of hypertension 08/24/2021    Dyslipidemia 11/04/2022     Past Medical History:   Diagnosis Date    Allergic     Chronic allergic rhinitis     Condition not found     COPD (chronic obstructive pulmonary disease)     Diabetes     GERD (gastroesophageal reflux disease)     Hypertension     Renal insufficiency 2019         PAST SURGICAL HISTORY  Past Surgical History:   Procedure Laterality Date    ABDOMINAL HYSTERECTOMY  1981    APPENDECTOMY      BREAST SURGERY  1970    CATARACT EXTRACTION Bilateral 01/23/2012 1/23/2012- left eye; 3/5/2012- right eye    COLON RESECTION  01/27/2020    closed loop bowel obstruction removed    ESSURE TUBAL LIGATION  2012 2/22/2012- right leg venous closure; 2/29/2012- left leg venous closure; 3/7/2012 bilateral leg back closure    FASCIOTOMY Right     9/2004; right heel    HEEL SPUR SURGERY      1/2004    HERNIA REPAIR  2012    PLANTAR FASCIECTOMY Left 11/09/2011    foot    SKIN CANCER EXCISION      TONSILLECTOMY  1959         FAMILY HISTORY  Family History   Problem Relation Age of Onset    Heart disease Mother     Heart valve disorder Mother         mitral valve prolapse    Stroke Father     Diabetes Father     Diabetes Brother          SOCIAL HISTORY  Social History     Socioeconomic History    Marital status:    Tobacco Use     Smoking status: Never    Smokeless tobacco: Never   Substance and Sexual Activity    Alcohol use: Never    Drug use: Never    Sexual activity: Not Currently     Partners: Male     Birth control/protection: None         ALLERGIES  Penicillins and Adhesive tape        REVIEW OF SYSTEMS  All systems reviewed and negative except for those discussed in HPI.       PHYSICAL EXAM    I have reviewed the triage vital signs and nursing notes.    ED Triage Vitals   Temp Heart Rate Resp BP SpO2   09/19/23 1422 09/19/23 1422 09/19/23 1435 09/19/23 1435 09/19/23 1422   97.9 °F (36.6 °C) 107 18 135/79 93 %      Temp src Heart Rate Source Patient Position BP Location FiO2 (%)   09/19/23 1422 09/19/23 1422 09/19/23 1435 09/19/23 1435 --   Tympanic Monitor Sitting Left arm        Physical Exam  GENERAL: Alert, oriented, not distressed  HENT: Mild diffuse tenderness to the bilateral trapezius muscles.  Decreased range of motion of the neck in relation to rotation.  EYES: no scleral icterus, EOMI, pupils equal round and reactive  CV: regular rhythm, regular rate, no murmur  RESPIRATORY: normal effort, CTA  ABDOMEN: soft, nontender  MUSCULOSKELETAL: Subacute ecchymosis over the right thumb, mild tenderness at the MCP joint.  No deformity.  Full range of motion of the right thumb.  NEURO: alert, 5/5 strength in all extremities  SKIN: warm, dry      RADIOLOGY  XR Hand 3+ View Right    Result Date: 9/19/2023  XR HAND 3+ VW RIGHT-  INDICATIONS: Pain.  TECHNIQUE: 3 VIEWS OF THE RIGHT HAND  COMPARISON: None available  FINDINGS:  No acute fracture, erosion, or dislocation is identified. Osteoarthritic degenerative changes are seen, predominantly at distal interphalangeal joints and at the lateral aspect of the wrist. Follow-up/further evaluation can be obtained as indications persist.       As described.    This report was finalized on 9/19/2023 3:58 PM by Dr. Jonathan Pettit M.D.      CT Cervical Spine Without Contrast, CT Head Without  Contrast    Result Date: 9/19/2023  CT HEAD AND CERVICAL SPINE WITHOUT CONTRAST  CLINICAL HISTORY: Headache, dizziness, and neck pain.  TECHNIQUE: CT scan of the head was obtained with 3 mm axial soft tissue algorithm images. No intravenous contrast was administered. Sagittal and coronal reconstructions were obtained.  FINDINGS:   The ventricles, sulci, and cisterns are age-appropriate. The gray-white matter differentiation is within normal limits. The basal ganglia and thalami are unremarkable. Atherosclerotic calcifications are incidentally appreciated within the intracranial vasculature. The posterior fossa structures are otherwise unremarkable.       No CT evidence for acute intracranial pathology. The etiology of the patient's headache and dizziness is not further elucidated on this examination; and if further assessment is warranted, one could obtain an MRI of the brain for follow-up.   TECHNIQUE: CT scan of the cervical spine was obtained with 1 mm axial bone algorithm and 2 mm axial soft tissue algorithm images. Sagittal and coronal reconstructed images were obtained.  FINDINGS:  At C2-3, there is no significant degree of canal or foraminal stenosis.  At C3-4, combination of uncovertebral joint hypertrophy and facet arthrosis results in a moderate-to-severe degree of right foraminal narrowing.  At C4-5, there is degenerative anterior spondylolisthesis of C4 on C5 by approximately 2 mm. There is moderate-to-severe left foraminal narrowing secondary to combination of uncovertebral joint hypertrophy and facet arthrosis.  At C5-6, there are advanced degenerative disc changes. A disc osteophyte complex mildly indents the ventral subarachnoid space. There is mild-to-moderate left and mild right foraminal narrowing secondary to uncovertebral joint hypertrophy.  At C6-7, there are advanced degenerative disc changes. A disc osteophyte complex mildly indents the ventral subarachnoid space. There is mild bony foraminal  narrowing secondary to uncovertebral joint hypertrophy.  At C7-T1, there is no significant degree of canal or foraminal stenosis.  IMPRESSION:  Multilevel degenerative phenomena are identified within the cervical spine. There are advanced degenerative disc changes seen at C5-6 and C6-7. Disc osteophyte complexes at C5-6 and C6-7 result in relatively mild degrees of canal narrowing.  Multilevel foraminal stenotic changes are identified and include moderate-to-severe right C3-4, moderate-to-severe left C4-5, mild-to-moderate left C5-6, mild right C5-6, and mild bilateral C6-7 foraminal stenosis.  Further evaluation for soft tissue abnormalities such as disc herniations resulting in canal and foraminal stenotic change could be performed with MR imaging, as clinically indicated.   Radiation dose reduction techniques were utilized, including automated exposure control and exposure modulation based on body size.  This report was finalized on 9/19/2023 4:33 PM by Dr. Neel Matamoros M.D.       I ordered the above noted radiological studies. Reviewed by me and discussed with radiologist.  See dictation for official radiology interpretation.      MEDICATIONS GIVEN IN ER    Medications   HYDROcodone-acetaminophen (NORCO) 7.5-325 MG per tablet 1 tablet (1 tablet Oral Given 9/19/23 1537)   ondansetron ODT (ZOFRAN-ODT) disintegrating tablet 4 mg (4 mg Oral Given 9/19/23 1537)         ADDITIONAL ORDERS CONSIDERED BUT NOT ORDERED:  Nothing      PROGRESS, DATA ANALYSIS, CONSULTS, AND MEDICAL DECISION MAKING    All labs have been independently interpreted by myself.  All radiology studies have been independently interpreted by myself and discussed with radiologist dictating the report.   EKG's independently interpreted by myself.  Discussion below represents my analysis of pertinent findings related to patient's condition, differential diagnosis, treatment plan and final disposition.    I have discussed case with Dr. Lind, emergency  room physician.  He has performed his own bedside examination and agrees with treatment plan.    ED Course as of 09/19/23 1853   Tue Sep 19, 2023   1519 Patient presents with posterior neck pain with radiation up to the occipital scalp, bilateral shoulder.  No true radicular symptoms or neurodeficits.  Given patient's age will obtain CT imaging of the head and cervical spine [EE]   1548 Right hand films independently interpreted myself show no evidence of acute fracture. [EE]   1611 I discussed CT imaging of the head and cervical spine with Dr. Bishop.  No acute intracranial hemorrhage.  Degenerative changes of the lower cervical spine without evidence of acute fracture. [EE]   1640 Updated patient and family on work-up.  I suspect she likely has a muscular strain in her neck.  No concerning neurodeficits or radicular pain.  We will treat her with muscle relaxers.  She will take Tylenol at home for pain, as well as a topical salve that she has.  She also has a thumb splint to wear as needed. [EE]      ED Course User Index  [EE] Oliver Mercedes PA       AS OF 18:53 EDT VITALS:    BP - 135/79  HR - 74  TEMP - 97.9 °F (36.6 °C) (Tympanic)  O2 SATS - 93%        DIAGNOSIS  Final diagnoses:   Strain of neck muscle, initial encounter   Sprain of right thumb, unspecified site of digit, initial encounter         DISPOSITION  Discharged      Dictated utilizing Dragon dictation     Oliver Mercedes PA  09/19/23 1853

## 2023-10-16 RX ORDER — VENLAFAXINE 75 MG/1
75 TABLET ORAL DAILY
Qty: 90 TABLET | Refills: 1 | Status: SHIPPED | OUTPATIENT
Start: 2023-10-16

## 2023-10-18 RX ORDER — SITAGLIPTIN 100 MG/1
100 TABLET, FILM COATED ORAL DAILY
Qty: 90 TABLET | Refills: 0 | Status: SHIPPED | OUTPATIENT
Start: 2023-10-18

## 2023-10-18 RX ORDER — SITAGLIPTIN 100 MG/1
100 TABLET, FILM COATED ORAL DAILY
Qty: 90 TABLET | Refills: 0 | OUTPATIENT
Start: 2023-10-18

## 2023-11-02 RX ORDER — ATORVASTATIN CALCIUM 10 MG/1
10 TABLET, FILM COATED ORAL NIGHTLY
Qty: 90 TABLET | Refills: 1 | Status: SHIPPED | OUTPATIENT
Start: 2023-11-02

## 2023-12-04 RX ORDER — LISINOPRIL 20 MG/1
20 TABLET ORAL DAILY
Qty: 90 TABLET | Refills: 1 | Status: SHIPPED | OUTPATIENT
Start: 2023-12-04 | End: 2023-12-06 | Stop reason: SDUPTHER

## 2023-12-06 ENCOUNTER — OFFICE VISIT (OUTPATIENT)
Dept: INTERNAL MEDICINE | Facility: CLINIC | Age: 83
End: 2023-12-06
Payer: MEDICARE

## 2023-12-06 VITALS
HEART RATE: 79 BPM | DIASTOLIC BLOOD PRESSURE: 66 MMHG | SYSTOLIC BLOOD PRESSURE: 125 MMHG | BODY MASS INDEX: 32.56 KG/M2 | TEMPERATURE: 97.9 F | OXYGEN SATURATION: 98 % | WEIGHT: 183.8 LBS

## 2023-12-06 DIAGNOSIS — K21.9 GASTROESOPHAGEAL REFLUX DISEASE, UNSPECIFIED WHETHER ESOPHAGITIS PRESENT: ICD-10-CM

## 2023-12-06 DIAGNOSIS — R23.2 HOT FLASHES: ICD-10-CM

## 2023-12-06 DIAGNOSIS — G89.29 CHRONIC PAIN OF RIGHT KNEE: ICD-10-CM

## 2023-12-06 DIAGNOSIS — E78.5 HYPERLIPIDEMIA, UNSPECIFIED HYPERLIPIDEMIA TYPE: ICD-10-CM

## 2023-12-06 DIAGNOSIS — E03.9 HYPOTHYROIDISM, UNSPECIFIED TYPE: ICD-10-CM

## 2023-12-06 DIAGNOSIS — G25.81 RESTLESS LEG: ICD-10-CM

## 2023-12-06 DIAGNOSIS — E87.5 HYPERKALEMIA: ICD-10-CM

## 2023-12-06 DIAGNOSIS — I10 ESSENTIAL HYPERTENSION: ICD-10-CM

## 2023-12-06 DIAGNOSIS — E11.65 TYPE 2 DIABETES MELLITUS WITH HYPERGLYCEMIA, WITHOUT LONG-TERM CURRENT USE OF INSULIN: Primary | ICD-10-CM

## 2023-12-06 DIAGNOSIS — M25.561 CHRONIC PAIN OF RIGHT KNEE: ICD-10-CM

## 2023-12-06 DIAGNOSIS — N18.31 STAGE 3A CHRONIC KIDNEY DISEASE: ICD-10-CM

## 2023-12-06 DIAGNOSIS — J30.9 ALLERGIC RHINITIS, UNSPECIFIED SEASONALITY, UNSPECIFIED TRIGGER: ICD-10-CM

## 2023-12-06 DIAGNOSIS — E55.9 VITAMIN D DEFICIENCY: ICD-10-CM

## 2023-12-06 LAB
25(OH)D3 SERPL-MCNC: 47.5 NG/ML (ref 30–100)
ALBUMIN SERPL-MCNC: 4.5 G/DL (ref 3.5–5.2)
ALBUMIN/GLOB SERPL: 2 G/DL
ALP SERPL-CCNC: 53 U/L (ref 39–117)
ALT SERPL W P-5'-P-CCNC: 10 U/L (ref 1–33)
ANION GAP SERPL CALCULATED.3IONS-SCNC: 9 MMOL/L (ref 5–15)
AST SERPL-CCNC: 16 U/L (ref 1–32)
BASOPHILS # BLD AUTO: 0.04 10*3/MM3 (ref 0–0.2)
BASOPHILS NFR BLD AUTO: 0.7 % (ref 0–1.5)
BILIRUB SERPL-MCNC: 0.3 MG/DL (ref 0–1.2)
BUN SERPL-MCNC: 26 MG/DL (ref 8–23)
BUN/CREAT SERPL: 18.1 (ref 7–25)
CALCIUM SPEC-SCNC: 9.3 MG/DL (ref 8.6–10.5)
CHLORIDE SERPL-SCNC: 108 MMOL/L (ref 98–107)
CHOLEST SERPL-MCNC: 142 MG/DL (ref 0–200)
CO2 SERPL-SCNC: 23 MMOL/L (ref 22–29)
CREAT SERPL-MCNC: 1.44 MG/DL (ref 0.57–1)
DEPRECATED RDW RBC AUTO: 41 FL (ref 37–54)
EGFRCR SERPLBLD CKD-EPI 2021: 36.6 ML/MIN/1.73
EOSINOPHIL # BLD AUTO: 0.24 10*3/MM3 (ref 0–0.4)
EOSINOPHIL NFR BLD AUTO: 4.3 % (ref 0.3–6.2)
ERYTHROCYTE [DISTWIDTH] IN BLOOD BY AUTOMATED COUNT: 12.7 % (ref 12.3–15.4)
GLOBULIN UR ELPH-MCNC: 2.2 GM/DL
GLUCOSE SERPL-MCNC: 138 MG/DL (ref 65–99)
HBA1C MFR BLD: 8.7 % (ref 4.8–5.6)
HCT VFR BLD AUTO: 38.4 % (ref 34–46.6)
HDLC SERPL-MCNC: 71 MG/DL (ref 40–60)
HGB BLD-MCNC: 12.7 G/DL (ref 12–15.9)
IMM GRANULOCYTES # BLD AUTO: 0.01 10*3/MM3 (ref 0–0.05)
IMM GRANULOCYTES NFR BLD AUTO: 0.2 % (ref 0–0.5)
LDLC SERPL CALC-MCNC: 47 MG/DL (ref 0–100)
LDLC/HDLC SERPL: 0.6 {RATIO}
LYMPHOCYTES # BLD AUTO: 1.22 10*3/MM3 (ref 0.7–3.1)
LYMPHOCYTES NFR BLD AUTO: 21.8 % (ref 19.6–45.3)
MCH RBC QN AUTO: 29.5 PG (ref 26.6–33)
MCHC RBC AUTO-ENTMCNC: 33.1 G/DL (ref 31.5–35.7)
MCV RBC AUTO: 89.3 FL (ref 79–97)
MONOCYTES # BLD AUTO: 0.52 10*3/MM3 (ref 0.1–0.9)
MONOCYTES NFR BLD AUTO: 9.3 % (ref 5–12)
NEUTROPHILS NFR BLD AUTO: 3.56 10*3/MM3 (ref 1.7–7)
NEUTROPHILS NFR BLD AUTO: 63.7 % (ref 42.7–76)
NRBC BLD AUTO-RTO: 0 /100 WBC (ref 0–0.2)
PLATELET # BLD AUTO: 249 10*3/MM3 (ref 140–450)
PMV BLD AUTO: 10.9 FL (ref 6–12)
POTASSIUM SERPL-SCNC: 5 MMOL/L (ref 3.5–5.2)
PROT SERPL-MCNC: 6.7 G/DL (ref 6–8.5)
RBC # BLD AUTO: 4.3 10*6/MM3 (ref 3.77–5.28)
SODIUM SERPL-SCNC: 140 MMOL/L (ref 136–145)
T4 FREE SERPL-MCNC: 1.22 NG/DL (ref 0.93–1.7)
TRIGL SERPL-MCNC: 142 MG/DL (ref 0–150)
TSH SERPL DL<=0.05 MIU/L-ACNC: 1.56 UIU/ML (ref 0.27–4.2)
VLDLC SERPL-MCNC: 24 MG/DL (ref 5–40)
WBC NRBC COR # BLD AUTO: 5.59 10*3/MM3 (ref 3.4–10.8)

## 2023-12-06 PROCEDURE — 83036 HEMOGLOBIN GLYCOSYLATED A1C: CPT | Performed by: NURSE PRACTITIONER

## 2023-12-06 PROCEDURE — 84443 ASSAY THYROID STIM HORMONE: CPT | Performed by: NURSE PRACTITIONER

## 2023-12-06 PROCEDURE — 82306 VITAMIN D 25 HYDROXY: CPT | Performed by: NURSE PRACTITIONER

## 2023-12-06 PROCEDURE — 84439 ASSAY OF FREE THYROXINE: CPT | Performed by: NURSE PRACTITIONER

## 2023-12-06 PROCEDURE — 85025 COMPLETE CBC W/AUTO DIFF WBC: CPT | Performed by: NURSE PRACTITIONER

## 2023-12-06 PROCEDURE — 80061 LIPID PANEL: CPT | Performed by: NURSE PRACTITIONER

## 2023-12-06 PROCEDURE — 80053 COMPREHEN METABOLIC PANEL: CPT | Performed by: NURSE PRACTITIONER

## 2023-12-06 RX ORDER — ESTRADIOL 0.5 MG/1
0.5 TABLET ORAL DAILY
Qty: 90 TABLET | Refills: 1 | Status: SHIPPED | OUTPATIENT
Start: 2023-12-06

## 2023-12-06 RX ORDER — ROPINIROLE 0.25 MG/1
0.25 TABLET, FILM COATED ORAL NIGHTLY
Qty: 30 TABLET | Refills: 1 | Status: SHIPPED | OUTPATIENT
Start: 2023-12-06

## 2023-12-06 RX ORDER — ATORVASTATIN CALCIUM 10 MG/1
10 TABLET, FILM COATED ORAL NIGHTLY
Qty: 90 TABLET | Refills: 1 | Status: SHIPPED | OUTPATIENT
Start: 2023-12-06

## 2023-12-06 RX ORDER — ESOMEPRAZOLE MAGNESIUM 40 MG/1
40 CAPSULE, DELAYED RELEASE ORAL 2 TIMES DAILY
Qty: 180 CAPSULE | Refills: 1 | Status: CANCELLED | OUTPATIENT
Start: 2023-12-06

## 2023-12-06 RX ORDER — ESOMEPRAZOLE MAGNESIUM 40 MG/1
40 CAPSULE, DELAYED RELEASE ORAL DAILY
Qty: 90 CAPSULE | Refills: 1 | Status: SHIPPED | OUTPATIENT
Start: 2023-12-06

## 2023-12-06 RX ORDER — CYCLOBENZAPRINE HCL 5 MG
5 TABLET ORAL 3 TIMES DAILY PRN
Qty: 30 TABLET | Refills: 0 | Status: SHIPPED | OUTPATIENT
Start: 2023-12-06

## 2023-12-06 RX ORDER — CETIRIZINE HYDROCHLORIDE 10 MG/1
10 TABLET ORAL DAILY
Qty: 90 TABLET | Refills: 1 | Status: SHIPPED | OUTPATIENT
Start: 2023-12-06

## 2023-12-06 RX ORDER — METHYLPREDNISOLONE 4 MG/1
TABLET ORAL
Qty: 21 TABLET | Refills: 0 | Status: SHIPPED | OUTPATIENT
Start: 2023-12-06

## 2023-12-06 RX ORDER — ROPINIROLE 0.25 MG/1
0.25 TABLET, FILM COATED ORAL NIGHTLY
Qty: 90 TABLET | OUTPATIENT
Start: 2023-12-06

## 2023-12-06 RX ORDER — LISINOPRIL 20 MG/1
20 TABLET ORAL DAILY
Qty: 90 TABLET | Refills: 1 | Status: SHIPPED | OUTPATIENT
Start: 2023-12-06

## 2023-12-06 NOTE — ASSESSMENT & PLAN NOTE
Will trial Requip. Discussed potential side effects, including drowsiness. Will discuss effectiveness via MyChart in one month, sooner if concerns arise.

## 2023-12-06 NOTE — PROGRESS NOTES
Chief Complaint  Diabetes (Follow up )    Subjective         Griselda Henderson presents to [facility]  HPI     Right knee pain-  States her right knee has been progressively worsening. Keeps her up at night. Bending and steps flare pain. Will treat with Cryoderm, not helping as much as it used to. Is also having sciatic nerve pain on her left side. If she twists or bends over she will get the sharp shooting pain. Last episode was six years ago and no medications would help.     Patient states she has restless leg and has not been sleeping well. Has to get up and drink pickle juice before it will stop. Was initially occasionally, is every night now. Would like to trial a medication for this.     GERD-  Doing well with esomeprazole.      HLD-  Managed with statin.  Most recent LDL 76.     DM2-  Currently managed with Januvia, Jardiance, Metformin.  She does not check her blood sugar at home. Most recent A1c 8.4, states she expects it will be about the same this time. Diabetic eye exam: 6/2023.  Diabetic foot exam: 6/2023. Urine microalbumin: 11/2023. Renal protection: ACE. Pneumonia vaccination: Up to date.     Hot flashes-  Continues Effexor and estradiol. Still has some episodes at night but these are manageable with the medication.     CKD-  Most recent GFR 45. Switching to [facility] in [place] as it is closer to home. Will have first appointment with new provider in May. States her potassium was slightly elevated on recent labs.      Hypothyroid-  Managed with Synthroid.      HTN-  Continues lisinopril. She has not been checking her blood pressure at home. Denies chest pain, headache, blurry vision, leg swelling.     Vitamin D deficiency-  Continues oral supplement.     Allergic rhinitis-  Doing well with Zyrtec and Singulair, is considering stopping these for the winter.      Shingles vaccination: Up to date  COVID-19 vaccination: Up to date  Pneumonia  vaccination: Up to date  Influenza vaccination: Up to date   Mammogram: 6/2023   DEXA: 6/2022, due 2024  RSV vaccination: Up to date        Objective     Vitals:    12/06/23 1142   BP: 125/66   Pulse: 79   Temp: 97.9 °F (36.6 °C)   TempSrc: Temporal   SpO2: 98%   Weight: 83.4 kg (183 lb 12.8 oz)      Body mass index is 32.56 kg/m².    Wt Readings from Last 3 Encounters:   12/06/23 83.4 kg (183 lb 12.8 oz)   09/19/23 79.4 kg (175 lb)   06/06/23 80.7 kg (178 lb)     BP Readings from Last 3 Encounters:   12/06/23 125/66   09/19/23 135/79   06/06/23 128/64                  Physical Exam  Constitutional:       Appearance: Normal appearance.   HENT:      Head: Normocephalic and atraumatic.      Nose: Nose normal.      Mouth/Throat:      Mouth: Mucous membranes are moist.      Pharynx: Oropharynx is clear.   Eyes:      Extraocular Movements: Extraocular movements intact.      Conjunctiva/sclera: Conjunctivae normal.      Pupils: Pupils are equal, round, and reactive to light.   Cardiovascular:      Rate and Rhythm: Normal rate and regular rhythm.      Heart sounds: Normal heart sounds.   Pulmonary:      Effort: Pulmonary effort is normal.      Breath sounds: Normal breath sounds.   Skin:     General: Skin is warm and dry.   Neurological:      General: No focal deficit present.      Mental Status: She is alert and oriented to person, place, and time.   Psychiatric:         Mood and Affect: Mood normal.         Behavior: Behavior normal.         Thought Content: Thought content normal.          Result Review :   The following data was reviewed by: KAT Willingham on 12/06/2023:      Procedures    Assessment and Plan   Diagnoses and all orders for this visit:    1. Type 2 diabetes mellitus with hyperglycemia, without long-term current use of insulin (Primary)  Assessment & Plan:  Continue Januvia, Jardiance, Metformin. Most recent A1c 8.4. She is not interested in medication adjustments. Will repeat labs today and  determine plan of care based on results. She should monitor blood glucose levels closely and call or return to clinic with consistent elevations or frequent lows. Repeat labs in clinic today. Diabetic eye exam: 6/2023.  Diabetic foot exam: 6/2023. Urine microalbumin: 11/2023. Renal protection: ACE. Pneumonia vaccination: Up to date.     Orders:  -     CBC & Differential  -     Comprehensive Metabolic Panel  -     Hemoglobin A1c  -     Lipid Panel    2. Essential hypertension  Assessment & Plan:  Well controlled, continue lisinopril. Patient should monitor blood pressure at home and call or return to clinic with consistent elevations greater than 130/80. Labs in clinic today.        3. Hyperlipidemia, unspecified hyperlipidemia type  Assessment & Plan:  Continue statin. Most recent LDL 76, goal less than 70 with DM2. Lipid panel with labs.        4. Hypothyroidism, unspecified type  Assessment & Plan:  Well controlled on previous labs, continue Synthroid. Thyroid profile in clinic today. Will adjust medication based on results of labs.      Orders:  -     TSH  -     T4, free    5. Vitamin D deficiency  Assessment & Plan:  Continue vitamin D supplement, vitamin D level with labs today.       Orders:  -     Vitamin D 25 hydroxy    6. Stage 3a chronic kidney disease  Assessment & Plan:  Repeat CMP today, follow up with nephrology as scheduled.       7. Hyperkalemia  -     Cancel: Basic metabolic panel    8. Chronic pain of right knee  Assessment & Plan:  Patient would like to pursue xray. Will consider PT and/or orthopedics based on results.    Orders:  -     XR Knee 3 View Right; Future    9. Restless leg  Assessment & Plan:  Will trial Requip. Discussed potential side effects, including drowsiness. Will discuss effectiveness via MyChart in one month, sooner if concerns arise.      10. Gastroesophageal reflux disease, unspecified whether esophagitis present  Assessment & Plan:  Well controlled, continue PPI.          11. Hot flashes  Assessment & Plan:  Continue Effexor and estradiol. Patient aware of risks of HRT, is not interested in cessation at this time.       12. Allergic rhinitis, unspecified seasonality, unspecified trigger  Assessment & Plan:  Patient will consider discontinuing allergy medications for the  winter, she is aware it is okay to take these year around.       Other orders  -     lisinopril (PRINIVIL,ZESTRIL) 20 MG tablet; Take 1 tablet by mouth Daily.  Dispense: 90 tablet; Refill: 1  -     estradiol (ESTRACE) 0.5 MG tablet; Take 1 tablet by mouth Daily.  Dispense: 90 tablet; Refill: 1  -     atorvastatin (LIPITOR) 10 MG tablet; Take 1 tablet by mouth Every Night.  Dispense: 90 tablet; Refill: 1  -     cetirizine (zyrTEC) 10 MG tablet; Take 1 tablet by mouth Daily.  Dispense: 90 tablet; Refill: 1  -     esomeprazole (nexIUM) 40 MG capsule; Take 1 capsule by mouth Daily.  Dispense: 90 capsule; Refill: 1  -     methylPREDNISolone (MEDROL) 4 MG dose pack; Take as directed on package instructions.  Dispense: 21 tablet; Refill: 0  -     cyclobenzaprine (FLEXERIL) 5 MG tablet; Take 1 tablet by mouth 3 (Three) Times a Day As Needed for Muscle Spasms.  Dispense: 30 tablet; Refill: 0  -     rOPINIRole (REQUIP) 0.25 MG tablet; Take 1 tablet by mouth Every Night. Take 1 hour before bedtime.  Dispense: 30 tablet; Refill: 1          Follow Up   Return in about 6 months (around 6/6/2024).  Patient was given instructions and counseling regarding her condition or for health maintenance advice. Please see specific information pulled into the AVS if appropriate.

## 2023-12-06 NOTE — ASSESSMENT & PLAN NOTE
Continue vitamin D supplement, vitamin D level with labs today.      standing/walking/toileting Posterior Auricular Interpolation Flap Text: A decision was made to reconstruct the defect utilizing an interpolation axial flap and a staged reconstruction.  A telfa template was made of the defect.  This telfa template was then used to outline the posterior auricular interpolation flap.  The donor area for the pedicle flap was then injected with anesthesia.  The flap was excised through the skin and subcutaneous tissue down to the layer of the underlying musculature.  The pedicle flap was carefully excised within this deep plane to maintain its blood supply.  The edges of the donor site were undermined.   The donor site was closed in a primary fashion.  The pedicle was then rotated into position and sutured.  Once the tube was sutured into place, adequate blood supply was confirmed with blanching and refill.  The pedicle was then wrapped with xeroform gauze and dressed appropriately with a telfa and gauze bandage to ensure continued blood supply and protect the attached pedicle.

## 2023-12-06 NOTE — ASSESSMENT & PLAN NOTE
Patient will consider discontinuing allergy medications for the  winter, she is aware it is okay to take these year around.

## 2023-12-06 NOTE — ASSESSMENT & PLAN NOTE
Continue Effexor and estradiol. Patient aware of risks of HRT, is not interested in cessation at this time.

## 2023-12-06 NOTE — ASSESSMENT & PLAN NOTE
Continue Januvia, Jardiance, Metformin. Most recent A1c 8.4. She is not interested in medication adjustments. Will repeat labs today and determine plan of care based on results. She should monitor blood glucose levels closely and call or return to clinic with consistent elevations or frequent lows. Repeat labs in clinic today. Diabetic eye exam: 6/2023.  Diabetic foot exam: 6/2023. Urine microalbumin: 11/2023. Renal protection: ACE. Pneumonia vaccination: Up to date.

## 2023-12-07 ENCOUNTER — PRIOR AUTHORIZATION (OUTPATIENT)
Dept: INTERNAL MEDICINE | Facility: CLINIC | Age: 83
End: 2023-12-07
Payer: MEDICARE

## 2023-12-07 RX ORDER — SITAGLIPTIN 100 MG/1
100 TABLET, FILM COATED ORAL DAILY
Qty: 90 TABLET | Refills: 0 | Status: SHIPPED | OUTPATIENT
Start: 2023-12-07

## 2023-12-07 RX ORDER — EMPAGLIFLOZIN 25 MG/1
25 TABLET, FILM COATED ORAL DAILY
Qty: 90 TABLET | Refills: 1 | Status: SHIPPED | OUTPATIENT
Start: 2023-12-07

## 2023-12-07 NOTE — TELEPHONE ENCOUNTER
Pa started for flexeril via cover my meds    Pa approved     Approved. This drug has been approved under the Member's Medicare Part D benefit. Approved quantity: 30 units per 10 day(s). You may fill up to a 90 day supply except for those on Specialty Tier 5, which can be filled up to a 30 day supply. Please call the pharmacy to process the prescription claim.

## 2023-12-21 RX ORDER — EMPAGLIFLOZIN 25 MG/1
25 TABLET, FILM COATED ORAL DAILY
Qty: 90 TABLET | Refills: 1 | Status: CANCELLED | OUTPATIENT
Start: 2023-12-21

## 2023-12-26 RX ORDER — LEVOTHYROXINE SODIUM 0.05 MG/1
50 TABLET ORAL
Qty: 90 TABLET | Refills: 1 | Status: SHIPPED | OUTPATIENT
Start: 2023-12-26

## 2024-01-08 RX ORDER — ROPINIROLE 0.25 MG/1
0.25 TABLET, FILM COATED ORAL NIGHTLY
Qty: 90 TABLET | Refills: 1 | Status: SHIPPED | OUTPATIENT
Start: 2024-01-08

## 2024-01-09 DIAGNOSIS — E11.65 TYPE 2 DIABETES MELLITUS WITH HYPERGLYCEMIA, WITHOUT LONG-TERM CURRENT USE OF INSULIN: Primary | ICD-10-CM

## 2024-01-09 RX ORDER — SEMAGLUTIDE 1.34 MG/ML
0.25 INJECTION, SOLUTION SUBCUTANEOUS WEEKLY
Qty: 1.5 ML | Refills: 1 | Status: SHIPPED | OUTPATIENT
Start: 2024-01-09

## 2024-01-10 ENCOUNTER — HOSPITAL ENCOUNTER (OUTPATIENT)
Dept: GENERAL RADIOLOGY | Facility: HOSPITAL | Age: 84
Discharge: HOME | End: 2024-01-10
Admitting: NURSE PRACTITIONER
Payer: MEDICARE

## 2024-01-10 DIAGNOSIS — G89.29 CHRONIC PAIN OF RIGHT KNEE: ICD-10-CM

## 2024-01-10 DIAGNOSIS — M25.561 CHRONIC PAIN OF RIGHT KNEE: ICD-10-CM

## 2024-01-10 PROCEDURE — 73562 X-RAY EXAM OF KNEE 3: CPT

## 2024-01-19 RX ORDER — EMPAGLIFLOZIN 25 MG/1
25 TABLET, FILM COATED ORAL DAILY
Qty: 90 TABLET | Refills: 1 | OUTPATIENT
Start: 2024-01-19

## 2024-01-23 ENCOUNTER — TELEPHONE (OUTPATIENT)
Dept: INTERNAL MEDICINE | Facility: CLINIC | Age: 84
End: 2024-01-23
Payer: MEDICARE

## 2024-01-31 RX ORDER — SITAGLIPTIN 100 MG/1
100 TABLET, FILM COATED ORAL DAILY
Qty: 90 TABLET | Refills: 1 | Status: SHIPPED | OUTPATIENT
Start: 2024-01-31

## 2024-03-04 RX ORDER — MONTELUKAST SODIUM 10 MG/1
10 TABLET ORAL NIGHTLY
Qty: 90 TABLET | Refills: 1 | Status: SHIPPED | OUTPATIENT
Start: 2024-03-04

## 2024-03-05 RX ORDER — ROPINIROLE 0.5 MG/1
0.5 TABLET, FILM COATED ORAL NIGHTLY
Qty: 90 TABLET | Refills: 1 | Status: SHIPPED | OUTPATIENT
Start: 2024-03-05

## 2024-03-28 RX ORDER — LEVOTHYROXINE SODIUM 0.05 MG/1
50 TABLET ORAL
Qty: 90 TABLET | Refills: 1 | Status: SHIPPED | OUTPATIENT
Start: 2024-03-28

## 2024-03-29 RX ORDER — EMPAGLIFLOZIN 25 MG/1
25 TABLET, FILM COATED ORAL DAILY
Qty: 90 TABLET | Refills: 1 | Status: SHIPPED | OUTPATIENT
Start: 2024-03-29

## 2024-04-02 RX ORDER — VENLAFAXINE 75 MG/1
75 TABLET ORAL DAILY
Qty: 90 TABLET | Refills: 1 | Status: SHIPPED | OUTPATIENT
Start: 2024-04-02

## 2024-04-08 RX ORDER — MONTELUKAST SODIUM 10 MG/1
10 TABLET ORAL NIGHTLY
Qty: 90 TABLET | Refills: 1 | Status: SHIPPED | OUTPATIENT
Start: 2024-04-08

## 2024-04-09 DIAGNOSIS — E11.65 TYPE 2 DIABETES MELLITUS WITH HYPERGLYCEMIA, WITHOUT LONG-TERM CURRENT USE OF INSULIN: ICD-10-CM

## 2024-04-09 RX ORDER — SEMAGLUTIDE 1.34 MG/ML
0.5 INJECTION, SOLUTION SUBCUTANEOUS WEEKLY
Qty: 4.5 ML | Refills: 1 | Status: SHIPPED | OUTPATIENT
Start: 2024-04-09 | End: 2024-07-08

## 2024-04-24 DIAGNOSIS — E11.65 TYPE 2 DIABETES MELLITUS WITH HYPERGLYCEMIA, WITHOUT LONG-TERM CURRENT USE OF INSULIN: ICD-10-CM

## 2024-04-24 RX ORDER — SEMAGLUTIDE 0.68 MG/ML
INJECTION, SOLUTION SUBCUTANEOUS
Qty: 3 ML | OUTPATIENT
Start: 2024-04-24

## 2024-04-24 NOTE — TELEPHONE ENCOUNTER
The original prescription was reordered on 1/31/2024 by Dory Ca APRN. Renewing this prescription may not be appropriate.

## 2024-04-25 ENCOUNTER — TELEPHONE (OUTPATIENT)
Dept: ORTHOPEDIC SURGERY | Facility: CLINIC | Age: 84
End: 2024-04-25

## 2024-04-25 NOTE — TELEPHONE ENCOUNTER
Caller: Griselda Henderson    Relationship to patient: Self    Best call back number: [phone]    Type of visit: NEW PATIENT    If rescheduling, when is the original appointment: 04/25/24     Additional notes: ATTEMPTED TO WT. PT WAS IN THE OFFICE ON 04/25/24 WITH HER  AND SCHEDULED AN APPT FOR HERSELF. SHE GOT HOME AND REALIZED THAT THE APPT WAS SCHEDULED FOR THE SAME DAY AND CALLED TO RESCHEDULE. I SEE THAT THE LOCATION FOR THE APPT WAS AT [facility] FOR CHRISTOPHE CHESTER AND WAS UNSURE IF I COULD RESCHEDULE THAT APPT. PLEASE CONTACT PT TO RESCHEDULE.

## 2024-04-30 ENCOUNTER — OFFICE VISIT (OUTPATIENT)
Dept: ORTHOPEDIC SURGERY | Facility: CLINIC | Age: 84
End: 2024-04-30
Payer: MEDICARE

## 2024-04-30 VITALS — HEIGHT: 63 IN | WEIGHT: 163.6 LBS | TEMPERATURE: 96.6 F | BODY MASS INDEX: 28.99 KG/M2

## 2024-04-30 DIAGNOSIS — M17.11 PRIMARY OSTEOARTHRITIS OF RIGHT KNEE: Primary | ICD-10-CM

## 2024-04-30 RX ADMIN — LIDOCAINE HYDROCHLORIDE 2 ML: 20 INJECTION, SOLUTION EPIDURAL; INFILTRATION; INTRACAUDAL; PERINEURAL at 14:18

## 2024-04-30 RX ADMIN — METHYLPREDNISOLONE ACETATE 1 ML: 80 INJECTION, SUSPENSION INTRA-ARTICULAR; INTRALESIONAL; INTRAMUSCULAR; SOFT TISSUE at 14:18

## 2024-04-30 NOTE — PROGRESS NOTES
Patient Name: Griselda Henderson   YOB: 1940  Referring Primary Care Physician: Dory Ca APRN  BMI: Body mass index is 28.98 kg/m².    Chief Complaint:    Chief Complaint   Patient presents with    Right Knee - Pain, Initial Evaluation        HPI: Pt presents with pain in right knee that is persisting and she does not want to go to the grocery and do her normal activities. No injury or trauma    Griselda Henderson is a 83 y.o. female who presents today for evaluation of   Chief Complaint   Patient presents with    Right Knee - Pain, Initial Evaluation         Subjective   Medications:   Home Medications:  Current Outpatient Medications on File Prior to Visit   Medication Sig    Aspirin Buf,CaCarb-MgCarb-MgO, 81 MG tablet Take 81 mg by mouth Daily.    atorvastatin (LIPITOR) 10 MG tablet Take 1 tablet by mouth Every Night.    cetirizine (zyrTEC) 10 MG tablet Take 1 tablet by mouth Daily.    Cholecalciferol (Vitamin D) 50 MCG (2000 UT) tablet Take 1 tablet by mouth Daily.    cyclobenzaprine (FLEXERIL) 5 MG tablet Take 1 tablet by mouth 3 (Three) Times a Day As Needed for Muscle Spasms.    Diclofenac Sodium (VOLTAREN) 1 % gel gel APPLY TOPICALLY TO THE AFFECTED AREA FOUR TIMES DAILY AS DIRECTED    esomeprazole (nexIUM) 40 MG capsule Take 1 capsule by mouth Daily.    estradiol (ESTRACE) 0.5 MG tablet Take 1 tablet by mouth Daily.    glucose blood test strip Test blood glucose levels four times daily; Contour Next Test Strips    Jardiance 25 MG tablet tablet Take 1 tablet by mouth Daily.    levothyroxine (SYNTHROID, LEVOTHROID) 50 MCG tablet TAKE 1 TABLET BY MOUTH EVERY MORNING    lisinopril (PRINIVIL,ZESTRIL) 20 MG tablet Take 1 tablet by mouth Daily.    metFORMIN (GLUCOPHAGE) 1000 MG tablet TAKE 1 TABLET BY MOUTH TWICE DAILY    montelukast (SINGULAIR) 10 MG tablet Take 1 tablet by mouth Every Night.    multivitamin (THERAGRAN) tablet tablet Take 1 tablet by mouth.    rOPINIRole (REQUIP) 0.5 MG tablet Take 1  tablet by mouth Every Night. Take 1 hour before bedtime.    Semaglutide,0.25 or 0.5MG/DOS, (Ozempic, 0.25 or 0.5 MG/DOSE,) 2 MG/1.5ML solution pen-injector Inject 0.5 mg under the skin into the appropriate area as directed 1 (One) Time Per Week for 90 days.    venlafaxine (EFFEXOR) 75 MG tablet TAKE 1 TABLET BY MOUTH DAILY    methylPREDNISolone (MEDROL) 4 MG dose pack Take as directed on package instructions.     No current facility-administered medications on file prior to visit.     Current Medications:  Scheduled Meds:  Continuous Infusions:No current facility-administered medications for this visit.    PRN Meds:.    I have reviewed the patient's medical history in detail and updated the computerized patient record.  Review and summarization of old records includes:    Past Medical History:   Diagnosis Date    Allergic     Arthritis of back 2022    Arthritis of neck ?    Basal cell carcinoma     2008,10/1/2012,10/30/2012    Cataract     Cervical disc disorder 1999?    Chronic allergic rhinitis     Condition not found     hernia unspecified    COPD (chronic obstructive pulmonary disease)     Deafness     hearing aid    Diabetes     GERD (gastroesophageal reflux disease)     Heart murmur     Hemorrhoid     Hip arthrosis ?    Hypertension     Knee swelling ?    Lumbosacral disc disease ?    Night sweats     Renal insufficiency 2019        Past Surgical History:   Procedure Laterality Date    ABDOMINAL HYSTERECTOMY  1981    APPENDECTOMY      BREAST SURGERY  1970    CATARACT EXTRACTION Bilateral 01/23/2012 1/23/2012- left eye; 3/5/2012- right eye    COLON RESECTION  01/27/2020    closed loop bowel obstruction removed    ESSURE TUBAL LIGATION  2012 2/22/2012- right leg venous closure; 2/29/2012- left leg venous closure; 3/7/2012 bilateral leg back closure    FASCIOTOMY Right     9/2004; right heel    HEEL SPUR SURGERY      1/2004    HERNIA REPAIR  2012    PLANTAR FASCIECTOMY Left 11/09/2011    foot    SKIN CANCER  EXCISION      TONSILLECTOMY  1959    TRIGGER POINT INJECTION  ?    Many back injections over the years        Social History     Occupational History    Not on file   Tobacco Use    Smoking status: Never     Passive exposure: Never    Smokeless tobacco: Never   Vaping Use    Vaping status: Never Used   Substance and Sexual Activity    Alcohol use: Never    Drug use: Never    Sexual activity: Not Currently     Partners: Male     Birth control/protection: Vasectomy, Hysterectomy      Social History     Social History Narrative    Not on file        Family History   Problem Relation Age of Onset    Heart disease Mother     Heart valve disorder Mother         mitral valve prolapse    Stroke Father     Diabetes Father     Diabetes Brother        ROS: 14 point review of systems was performed and all other systems were reviewed and are negative except for documented findings in HPI and today's encounter.     Allergies:   Allergies   Allergen Reactions    Penicillins Hives    Adhesive Tape Rash     Constitutional:  Denies fever, shaking or chills   Eyes:  Denies change in visual acuity   HENT:  Denies nasal congestion or sore throat   Respiratory:  Denies cough or shortness of breath   Cardiovascular:  Denies chest pain or severe LE edema   GI:  Denies abdominal pain, nausea, vomiting, bloody stools or diarrhea   Musculoskeletal:  Numbness, tingling, pain, or loss of motor function only as noted above in history of present illness.  : Denies painful urination or hematuria  Integument:  Denies rash, lesion or ulceration   Neurologic:  Denies headache or focal weakness  Endocrine:  Denies lymphadenopathy  Psych:  Denies confusion or change in mental status   Hem:  Denies active bleeding    OBJECTIVE:  Physical Exam: 83 y.o. female  Wt Readings from Last 3 Encounters:   04/30/24 74.2 kg (163 lb 9.6 oz)   12/06/23 83.4 kg (183 lb 12.8 oz)   09/19/23 79.4 kg (175 lb)     Ht Readings from Last 1 Encounters:   04/30/24 160 cm  "(63\")     Body mass index is 28.98 kg/m².  Vitals:    04/30/24 1337   Temp: 96.6 °F (35.9 °C)     Vital signs reviewed.     General Appearance:    Alert, cooperative, in no acute distress                  Eyes: conjunctiva clear  ENT: external ears and nose atraumatic  CV: no peripheral edema  Resp: normal respiratory effort  Skin: no rashes or wounds; normal turgor  Psych: mood and affect appropriate  Lymph: no nodes appreciated  Neuro: gross sensation intact  Vascular:  Palpable peripheral pulse in noted extremity  Musculoskeletal Extremities: right knee ttp medial aspect with effusion, calf soft and nttp, ttp patella femoral, ambulates without assistance devices, nvi +pms     Radiology:   Narrative & Impression   XR KNEE 3 VW RIGHT-     Clinical: Right-sided knee pain x10 years     FINDINGS: There is narrowing of the medial compartment, lateral  compartment preserved. Patellofemoral joint widths preserved. There is a  superior patellar spur seen. No joint effusion or acute osseous  abnormality is demonstrated.     CONCLUSION: Patellar spur and medial compartment narrowing.     This report was finalized on 1/10/2024 2:42 PM by Dr. Sb Platt M.D  on Workstation: [id number]       Assessment:     ICD-10-CM ICD-9-CM   1. Primary osteoarthritis of right knee  M17.11 715.16        Large Joint Arthrocentesis: R knee  Date/Time: 4/30/2024 2:18 PM  Consent given by: patient  Site marked: site marked  Timeout: Immediately prior to procedure a time out was called to verify the correct patient, procedure, equipment, support staff and site/side marked as required   Supporting Documentation  Indications: pain, joint swelling and diagnostic evaluation   Procedure Details  Location: knee - R knee  Preparation: Patient was prepped and draped in the usual sterile fashion  Needle gauge: 21G.  Medications administered: 1 mL methylPREDNISolone acetate 80 MG/ML; 2 mL lidocaine PF 2% 2 %  Patient tolerance: patient tolerated the " procedure well with no immediate complications        MDM/Plan:   The diagnosis(es), natural history, pathophysiology and treatment for diagnosis(es) were discussed. Opportunity given and questions answered.  Biomechanics of pertinent body areas discussed.  When appropriate, the use of ambulatory aids discussed.  BMI:  The concept of BMI body mass index and its importance and implications discussed.    EXERCISES:  Advice on benefits of, and types of regular/moderate exercise pertaining to orthopedic diagnosis(es).  MEDICATIONS:  The risks, benefits, warnings,side effects and alternatives of medications discussed.  Inflammation/pain control; with cold, heat, elevation and/or liniments discussed as appropriate  Cortisone Injection. See procedure note.  HOME EXERCISE/PT program encouraged  MEDICAL RECORDS reviewed from other provider(s) for past and current medical history pertinent to this complaint.      4/30/2024    Much of this encounter note is an electronic transcription/translation of spoken language to printed text. The electronic translation of spoken language may permit erroneous, or at times, nonsensical words or phrases to be inadvertently transcribed; Although I have reviewed the note for such errors, some may still exist

## 2024-05-01 DIAGNOSIS — E11.65 TYPE 2 DIABETES MELLITUS WITH HYPERGLYCEMIA, WITHOUT LONG-TERM CURRENT USE OF INSULIN: ICD-10-CM

## 2024-05-01 RX ORDER — SEMAGLUTIDE 1.34 MG/ML
0.5 INJECTION, SOLUTION SUBCUTANEOUS WEEKLY
Qty: 4.5 ML | Refills: 1 | Status: SHIPPED | OUTPATIENT
Start: 2024-05-01 | End: 2024-07-30

## 2024-05-01 RX ORDER — SEMAGLUTIDE 0.68 MG/ML
INJECTION, SOLUTION SUBCUTANEOUS
Qty: 3 ML | OUTPATIENT
Start: 2024-05-01

## 2024-05-01 RX ORDER — METHYLPREDNISOLONE ACETATE 80 MG/ML
1 INJECTION, SUSPENSION INTRA-ARTICULAR; INTRALESIONAL; INTRAMUSCULAR; SOFT TISSUE
Status: COMPLETED | OUTPATIENT
Start: 2024-04-30 | End: 2024-04-30

## 2024-05-01 RX ORDER — LIDOCAINE HYDROCHLORIDE 20 MG/ML
2 INJECTION, SOLUTION EPIDURAL; INFILTRATION; INTRACAUDAL; PERINEURAL
Status: COMPLETED | OUTPATIENT
Start: 2024-04-30 | End: 2024-04-30

## 2024-05-01 NOTE — TELEPHONE ENCOUNTER
Spoke with pharmacy in regards ozempic medication, pharmacist stated they were needing a new prescription they do not know why her previous one was closed and were not able to fill it without a new one, medication has been sent to pharmacy.

## 2024-06-05 RX ORDER — ROPINIROLE 0.5 MG/1
0.5 TABLET, FILM COATED ORAL NIGHTLY
Qty: 90 TABLET | Refills: 1 | Status: SHIPPED | OUTPATIENT
Start: 2024-06-05

## 2024-06-12 RX ORDER — ESTRADIOL 0.5 MG/1
0.5 TABLET ORAL DAILY
Qty: 90 TABLET | Refills: 1 | Status: SHIPPED | OUTPATIENT
Start: 2024-06-12

## 2024-06-18 ENCOUNTER — OFFICE VISIT (OUTPATIENT)
Dept: INTERNAL MEDICINE | Facility: CLINIC | Age: 84
End: 2024-06-18
Payer: MEDICARE

## 2024-06-18 VITALS
BODY MASS INDEX: 28.1 KG/M2 | HEIGHT: 63 IN | SYSTOLIC BLOOD PRESSURE: 126 MMHG | DIASTOLIC BLOOD PRESSURE: 66 MMHG | TEMPERATURE: 97.3 F | OXYGEN SATURATION: 97 % | HEART RATE: 67 BPM | WEIGHT: 158.6 LBS

## 2024-06-18 DIAGNOSIS — K59.00 CONSTIPATION, UNSPECIFIED CONSTIPATION TYPE: ICD-10-CM

## 2024-06-18 DIAGNOSIS — J30.9 ALLERGIC RHINITIS, UNSPECIFIED SEASONALITY, UNSPECIFIED TRIGGER: ICD-10-CM

## 2024-06-18 DIAGNOSIS — Z00.00 MEDICARE ANNUAL WELLNESS VISIT, SUBSEQUENT: Primary | ICD-10-CM

## 2024-06-18 DIAGNOSIS — R23.2 HOT FLASHES: ICD-10-CM

## 2024-06-18 DIAGNOSIS — I10 ESSENTIAL HYPERTENSION: ICD-10-CM

## 2024-06-18 DIAGNOSIS — N18.31 STAGE 3A CHRONIC KIDNEY DISEASE: ICD-10-CM

## 2024-06-18 DIAGNOSIS — K21.9 GASTROESOPHAGEAL REFLUX DISEASE, UNSPECIFIED WHETHER ESOPHAGITIS PRESENT: ICD-10-CM

## 2024-06-18 DIAGNOSIS — H92.02 EAR PAIN, LEFT: ICD-10-CM

## 2024-06-18 DIAGNOSIS — E03.9 HYPOTHYROIDISM, UNSPECIFIED TYPE: ICD-10-CM

## 2024-06-18 DIAGNOSIS — E55.9 VITAMIN D DEFICIENCY: ICD-10-CM

## 2024-06-18 DIAGNOSIS — E11.65 TYPE 2 DIABETES MELLITUS WITH HYPERGLYCEMIA, WITHOUT LONG-TERM CURRENT USE OF INSULIN: ICD-10-CM

## 2024-06-18 DIAGNOSIS — E78.5 HYPERLIPIDEMIA, UNSPECIFIED HYPERLIPIDEMIA TYPE: ICD-10-CM

## 2024-06-18 LAB
25(OH)D3 SERPL-MCNC: 48.1 NG/ML (ref 30–100)
ALBUMIN SERPL-MCNC: 3.8 G/DL (ref 3.5–5.2)
ALBUMIN/GLOB SERPL: 1.4 G/DL
ALP SERPL-CCNC: 51 U/L (ref 39–117)
ALT SERPL W P-5'-P-CCNC: 14 U/L (ref 1–33)
ANION GAP SERPL CALCULATED.3IONS-SCNC: 11.8 MMOL/L (ref 5–15)
AST SERPL-CCNC: <5 U/L (ref 1–32)
BASOPHILS # BLD AUTO: 0.05 10*3/MM3 (ref 0–0.2)
BASOPHILS NFR BLD AUTO: 0.9 % (ref 0–1.5)
BILIRUB SERPL-MCNC: 0.4 MG/DL (ref 0–1.2)
BUN SERPL-MCNC: 23 MG/DL (ref 8–23)
BUN/CREAT SERPL: 17.8 (ref 7–25)
CALCIUM SPEC-SCNC: 10.1 MG/DL (ref 8.6–10.5)
CHLORIDE SERPL-SCNC: 103 MMOL/L (ref 98–107)
CHOLEST SERPL-MCNC: 125 MG/DL (ref 0–200)
CO2 SERPL-SCNC: 24.2 MMOL/L (ref 22–29)
CREAT SERPL-MCNC: 1.29 MG/DL (ref 0.57–1)
DEPRECATED RDW RBC AUTO: 43.1 FL (ref 37–54)
EGFRCR SERPLBLD CKD-EPI 2021: 41.8 ML/MIN/1.73
EOSINOPHIL # BLD AUTO: 0.26 10*3/MM3 (ref 0–0.4)
EOSINOPHIL NFR BLD AUTO: 4.5 % (ref 0.3–6.2)
ERYTHROCYTE [DISTWIDTH] IN BLOOD BY AUTOMATED COUNT: 12.6 % (ref 12.3–15.4)
GLOBULIN UR ELPH-MCNC: 2.8 GM/DL
GLUCOSE SERPL-MCNC: 182 MG/DL (ref 65–99)
HBA1C MFR BLD: 8 % (ref 4.8–5.6)
HCT VFR BLD AUTO: 44.4 % (ref 34–46.6)
HDLC SERPL-MCNC: 64 MG/DL (ref 40–60)
HGB BLD-MCNC: 14.3 G/DL (ref 12–15.9)
IMM GRANULOCYTES # BLD AUTO: 0.03 10*3/MM3 (ref 0–0.05)
IMM GRANULOCYTES NFR BLD AUTO: 0.5 % (ref 0–0.5)
LDLC SERPL CALC-MCNC: 38 MG/DL (ref 0–100)
LDLC/HDLC SERPL: 0.54 {RATIO}
LYMPHOCYTES # BLD AUTO: 1.22 10*3/MM3 (ref 0.7–3.1)
LYMPHOCYTES NFR BLD AUTO: 21.2 % (ref 19.6–45.3)
MCH RBC QN AUTO: 29.7 PG (ref 26.6–33)
MCHC RBC AUTO-ENTMCNC: 32.2 G/DL (ref 31.5–35.7)
MCV RBC AUTO: 92.1 FL (ref 79–97)
MONOCYTES # BLD AUTO: 0.63 10*3/MM3 (ref 0.1–0.9)
MONOCYTES NFR BLD AUTO: 10.9 % (ref 5–12)
NEUTROPHILS NFR BLD AUTO: 3.57 10*3/MM3 (ref 1.7–7)
NEUTROPHILS NFR BLD AUTO: 62 % (ref 42.7–76)
NRBC BLD AUTO-RTO: 0 /100 WBC (ref 0–0.2)
PLATELET # BLD AUTO: 308 10*3/MM3 (ref 140–450)
PMV BLD AUTO: 11.3 FL (ref 6–12)
POTASSIUM SERPL-SCNC: 4.5 MMOL/L (ref 3.5–5.2)
PROT SERPL-MCNC: 6.6 G/DL (ref 6–8.5)
RBC # BLD AUTO: 4.82 10*6/MM3 (ref 3.77–5.28)
SODIUM SERPL-SCNC: 139 MMOL/L (ref 136–145)
T4 FREE SERPL-MCNC: 1.34 NG/DL (ref 0.92–1.68)
TRIGL SERPL-MCNC: 132 MG/DL (ref 0–150)
TSH SERPL DL<=0.05 MIU/L-ACNC: 2.15 UIU/ML (ref 0.27–4.2)
VLDLC SERPL-MCNC: 23 MG/DL (ref 5–40)
WBC NRBC COR # BLD AUTO: 5.76 10*3/MM3 (ref 3.4–10.8)

## 2024-06-18 PROCEDURE — 80053 COMPREHEN METABOLIC PANEL: CPT | Performed by: NURSE PRACTITIONER

## 2024-06-18 PROCEDURE — 80061 LIPID PANEL: CPT | Performed by: NURSE PRACTITIONER

## 2024-06-18 PROCEDURE — G0439 PPPS, SUBSEQ VISIT: HCPCS | Performed by: NURSE PRACTITIONER

## 2024-06-18 PROCEDURE — 84443 ASSAY THYROID STIM HORMONE: CPT | Performed by: NURSE PRACTITIONER

## 2024-06-18 PROCEDURE — 1160F RVW MEDS BY RX/DR IN RCRD: CPT | Performed by: NURSE PRACTITIONER

## 2024-06-18 PROCEDURE — 3078F DIAST BP <80 MM HG: CPT | Performed by: NURSE PRACTITIONER

## 2024-06-18 PROCEDURE — 99214 OFFICE O/P EST MOD 30 MIN: CPT | Performed by: NURSE PRACTITIONER

## 2024-06-18 PROCEDURE — 3074F SYST BP LT 130 MM HG: CPT | Performed by: NURSE PRACTITIONER

## 2024-06-18 PROCEDURE — 1170F FXNL STATUS ASSESSED: CPT | Performed by: NURSE PRACTITIONER

## 2024-06-18 PROCEDURE — 1125F AMNT PAIN NOTED PAIN PRSNT: CPT | Performed by: NURSE PRACTITIONER

## 2024-06-18 PROCEDURE — 84439 ASSAY OF FREE THYROXINE: CPT | Performed by: NURSE PRACTITIONER

## 2024-06-18 PROCEDURE — 82306 VITAMIN D 25 HYDROXY: CPT | Performed by: NURSE PRACTITIONER

## 2024-06-18 PROCEDURE — 85025 COMPLETE CBC W/AUTO DIFF WBC: CPT | Performed by: NURSE PRACTITIONER

## 2024-06-18 PROCEDURE — 83036 HEMOGLOBIN GLYCOSYLATED A1C: CPT | Performed by: NURSE PRACTITIONER

## 2024-06-18 PROCEDURE — 1159F MED LIST DOCD IN RCRD: CPT | Performed by: NURSE PRACTITIONER

## 2024-06-18 RX ORDER — MAGNESIUM OXIDE 400 MG/1
400 TABLET ORAL DAILY
COMMUNITY

## 2024-06-18 RX ORDER — PROCHLORPERAZINE 25 MG/1
1 SUPPOSITORY RECTAL ONCE
Qty: 1 EACH | Refills: 0 | Status: SHIPPED | OUTPATIENT
Start: 2024-06-18 | End: 2024-06-18

## 2024-06-18 RX ORDER — CIPROFLOXACIN AND DEXAMETHASONE 3; 1 MG/ML; MG/ML
4 SUSPENSION/ DROPS AURICULAR (OTIC) 2 TIMES DAILY
Qty: 7.5 ML | Refills: 0 | Status: SHIPPED | OUTPATIENT
Start: 2024-06-18 | End: 2024-06-25

## 2024-06-18 RX ORDER — PROCHLORPERAZINE 25 MG/1
SUPPOSITORY RECTAL
Qty: 9 EACH | Refills: 1 | Status: SHIPPED | OUTPATIENT
Start: 2024-06-18

## 2024-06-18 RX ORDER — POLYETHYLENE GLYCOL 3350 17 G/17G
17 POWDER, FOR SOLUTION ORAL DAILY
Qty: 510 G | Refills: 0 | Status: SHIPPED | OUTPATIENT
Start: 2024-06-18

## 2024-06-18 NOTE — ASSESSMENT & PLAN NOTE
Poorly controlled on most recent labs.  Continue Ozempic, Jardiance, metformin.  Patient would like to stay on GLP-1 despite stomach upset.  Repeat A1c today, will determine further plan of care based on results.  Will send CGM and supplies to pharmacy, patient struggling with checking her blood glucose levels at home.  Diabetic eye exam: 5/2024.  Diabetic foot exam: 6/2023. Urine microalbumin: 5/2024. Renal protection: ACE. Pneumonia vaccination: Up to date.

## 2024-06-18 NOTE — PROGRESS NOTES
The ABCs of the Annual Wellness Visit  Subsequent Medicare Wellness Visit    Subjective    Griselda Henderson is a 83 y.o. female who presents for a Subsequent Medicare Wellness Visit.    The following portions of the patient's history were reviewed and   updated as appropriate: allergies, current medications, past family history, past medical history, past social history, past surgical history, and problem list.    Compared to one year ago, the patient feels her physical   health is better.    Compared to one year ago, the patient feels her mental   health is the same.    Recent Hospitalizations:  She was not admitted to the hospital during the last year.       Current Medical Providers:  Patient Care Team:  Dory Ca APRN as PCP - General (Nurse Practitioner)    Outpatient Medications Prior to Visit   Medication Sig Dispense Refill    Aspirin Buf,CaCarb-MgCarb-MgO, 81 MG tablet Take 81 mg by mouth Daily.      atorvastatin (LIPITOR) 10 MG tablet Take 1 tablet by mouth Every Night. 90 tablet 1    cetirizine (zyrTEC) 10 MG tablet Take 1 tablet by mouth Daily. 90 tablet 1    Cholecalciferol (Vitamin D) 50 MCG (2000 UT) tablet Take 1 tablet by mouth Daily.      cyclobenzaprine (FLEXERIL) 5 MG tablet Take 1 tablet by mouth 3 (Three) Times a Day As Needed for Muscle Spasms. 30 tablet 0    Diclofenac Sodium (VOLTAREN) 1 % gel gel APPLY TOPICALLY TO THE AFFECTED AREA FOUR TIMES DAILY AS DIRECTED 100 g 1    esomeprazole (nexIUM) 40 MG capsule Take 1 capsule by mouth Daily. 90 capsule 1    estradiol (ESTRACE) 0.5 MG tablet TAKE 1 TABLET BY MOUTH DAILY 90 tablet 1    glucose blood test strip Test blood glucose levels four times daily; Contour Next Test Strips 100 each 3    Jardiance 25 MG tablet tablet Take 1 tablet by mouth Daily. 90 tablet 1    levothyroxine (SYNTHROID, LEVOTHROID) 50 MCG tablet TAKE 1 TABLET BY MOUTH EVERY MORNING 90 tablet 1    magnesium oxide (MAG-OX) 400 MG tablet Take 1 tablet by mouth Daily.       metFORMIN (GLUCOPHAGE) 1000 MG tablet TAKE 1 TABLET BY MOUTH TWICE DAILY 180 tablet 1    montelukast (SINGULAIR) 10 MG tablet Take 1 tablet by mouth Every Night. 90 tablet 1    multivitamin (THERAGRAN) tablet tablet Take 1 tablet by mouth.      rOPINIRole (REQUIP) 0.5 MG tablet TAKE 1 TABLET BY MOUTH EVERY NIGHT 1 HOUR BEFORE BEDTIME 90 tablet 1    Semaglutide,0.25 or 0.5MG/DOS, (Ozempic, 0.25 or 0.5 MG/DOSE,) 2 MG/1.5ML solution pen-injector Inject 0.5 mg under the skin into the appropriate area as directed 1 (One) Time Per Week for 90 days. 4.5 mL 1    venlafaxine (EFFEXOR) 75 MG tablet TAKE 1 TABLET BY MOUTH DAILY 90 tablet 1    lisinopril (PRINIVIL,ZESTRIL) 20 MG tablet Take 1 tablet by mouth Daily. (Patient not taking: Reported on 6/18/2024) 90 tablet 1    methylPREDNISolone (MEDROL) 4 MG dose pack Take as directed on package instructions. (Patient not taking: Reported on 6/18/2024) 21 tablet 0     No facility-administered medications prior to visit.       No opioid medication identified on active medication list. I have reviewed chart for other potential  high risk medication/s and harmful drug interactions in the elderly.        Aspirin is on active medication list. Aspirin use is indicated based on review of current medical condition/s. Pros and cons of this therapy have been discussed today. Benefits of this medication outweigh potential harm.  Patient has been encouraged to continue taking this medication.  .      Patient Active Problem List   Diagnosis    Type 2 diabetes mellitus with hyperglycemia, without long-term current use of insulin    Vitamin D deficiency    Hyperlipidemia    Allergic rhinitis    Stage 3b chronic kidney disease    Hypothyroidism    Hot flashes    Essential hypertension    Gastroesophageal reflux disease    Stage 3a chronic kidney disease    Postmenopausal    Abdominal hernia    Basal cell carcinoma    Cataract    Deafness    Heart murmur    Hemorrhoid    Hyperkalemia    Lumbago  "with sciatica    Scoliosis of lumbar spine    Neck pain    Night sweats    Medicare annual wellness visit, subsequent    Chronic pain of right knee    Restless leg    Constipation     Advance Care Planning   Advance Care Planning     Advance Directive is not on file.  ACP discussion was held with the patient during this visit. Patient has an advance directive (not in EMR), copy requested.     Objective    Vitals:    24 1101   BP: 126/66   BP Location: Left arm   Patient Position: Sitting   Cuff Size: Adult   Pulse: 67   Temp: 97.3 °F (36.3 °C)   TempSrc: Temporal   SpO2: 97%   Weight: 71.9 kg (158 lb 9.6 oz)   Height: 160 cm (62.99\")     Estimated body mass index is 28.1 kg/m² as calculated from the following:    Height as of this encounter: 160 cm (62.99\").    Weight as of this encounter: 71.9 kg (158 lb 9.6 oz).           Does the patient have evidence of cognitive impairment? No          HEALTH RISK ASSESSMENT    Smoking Status:  Social History     Tobacco Use   Smoking Status Never    Passive exposure: Never   Smokeless Tobacco Never     Alcohol Consumption:  Social History     Substance and Sexual Activity   Alcohol Use Never     Fall Risk Screen:    STEADI Fall Risk Assessment was completed, and patient is at LOW risk for falls.Assessment completed on:2024    Depression Screenin/18/2024    11:01 AM   PHQ-2/PHQ-9 Depression Screening   Little Interest or Pleasure in Doing Things 0-->not at all   Feeling Down, Depressed or Hopeless 0-->not at all   PHQ-9: Brief Depression Severity Measure Score 0       Health Habits and Functional and Cognitive Screenin/18/2024    11:00 AM   Functional & Cognitive Status   Do you have difficulty preparing food and eating? No   Do you have difficulty bathing yourself, getting dressed or grooming yourself? No   Do you have difficulty using the toilet? No   Do you have difficulty moving around from place to place? No   Do you have trouble with steps or " getting out of a bed or a chair? No   Current Diet Well Balanced Diet   Dental Exam Up to date   Eye Exam Up to date   Exercise (times per week) 2 times per week   Current Exercises Include Walking   Do you need help using the phone?  No   Are you deaf or do you have serious difficulty hearing?  No   Do you need help to go to places out of walking distance? No   Do you need help shopping? No   Do you need help preparing meals?  No   Do you need help with housework?  No   Do you need help with laundry? No   Do you need help taking your medications? No   Do you need help managing money? No   Do you ever drive or ride in a car without wearing a seat belt? No   Have you felt unusual stress, anger or loneliness in the last month? No   Who do you live with? Sibling   If you need help, do you have trouble finding someone available to you? No   Have you been bothered in the last four weeks by sexual problems? No   Do you have difficulty concentrating, remembering or making decisions? No       Age-appropriate Screening Schedule:  Refer to the list below for future screening recommendations based on patient's age, sex and/or medical conditions. Orders for these recommended tests are listed in the plan section. The patient has been provided with a written plan.    Health Maintenance   Topic Date Due    HEMOGLOBIN A1C  06/06/2024    DXA SCAN  06/18/2024 (Originally 6/14/2024)    COVID-19 Vaccine (9 - 2023-24 season) 06/20/2024 (Originally 2/4/2024)    TDAP/TD VACCINES (1 - Tdap) 06/18/2025 (Originally 10/19/1959)    INFLUENZA VACCINE  08/01/2024    LIPID PANEL  12/06/2024    BMI FOLLOWUP  05/01/2025    DIABETIC EYE EXAM  05/06/2025    URINE MICROALBUMIN  05/09/2025    ANNUAL WELLNESS VISIT  06/18/2025    RSV Vaccine - Adults  Completed    Pneumococcal Vaccine 65+  Completed    ZOSTER VACCINE  Completed                  CMS Preventative Services Quick Reference  Risk Factors Identified During Encounter  Immunizations  Discussed/Encouraged: COVID19  The above risks/problems have been discussed with the patient.  Pertinent information has been shared with the patient in the After Visit Summary.  An After Visit Summary and PPPS were made available to the patient.    Follow Up:   Next Medicare Wellness visit to be scheduled in 1 year.       Additional E&M Note during same encounter follows:  Patient has multiple medical problems which are significant and separately identifiable that require additional work above and beyond the Medicare Wellness Visit.      Chief Complaint  Diabetes (6 month f/u), Hypertension (F/u), Hyperlipidemia (F/u), and Hypothyroidism (F/u)    Subjective        HPI  Griselda Henderson is also being seen today for    DM2-  Currently managed with Ozempic, Jardiance, Metformin.  Reports home blood glucose readings ranging 140s-150s.  Patient but she has been having trouble getting blood from her fingers to check her levels, would like to pursue a CGM.  Most recent A1c 8.4, due for repeat labs today. Diabetic eye exam: 5/2024.  Diabetic foot exam: 6/2023. Urine microalbumin: 5/2024. Renal protection: ACE. Pneumonia vaccination: Up to date.     GERD-  Controlled with esomeprazole.  Patient admits to intermittent nausea, she is not sure if this is related to acid reflux or the Ozempic.  She was prescribed Zofran, admits this caused constipation.     HLD-  Managed with statin.  Most recent LDL 47.     Hot flashes-  Managed with Effexor and estradiol, patient has no concerns today.     CKD-  Most recent GFR 36.6.  Patient was taken off of lisinopril by nephrology.     Hypothyroid-  Continue Synthroid, due for repeat labs today.     Vitamin D deficiency-  Continues oral supplement.     Allergic rhinitis-  Patient reports intermittent left ear pain, states she tries to leave her hearing aids out at night after she showers.  Continue Zyrtec and Singulair.     Shingles vaccination: Up to date  COVID-19 vaccination: Declines  "booster   Pneumonia vaccination: Up to date  Mammogram: 6/2023 - declines repeat   DEXA: 6/2022 - declines repeat   RSV vaccination: Up to date          Objective   Vital Signs:  /66 (BP Location: Left arm, Patient Position: Sitting, Cuff Size: Adult)   Pulse 67   Temp 97.3 °F (36.3 °C) (Temporal)   Ht 160 cm (62.99\")   Wt 71.9 kg (158 lb 9.6 oz)   SpO2 97%   BMI 28.10 kg/m²     Physical Exam  Constitutional:       Appearance: Normal appearance.   HENT:      Head: Normocephalic and atraumatic.      Right Ear: Tympanic membrane normal.      Left Ear: Tympanic membrane normal.      Ears:      Comments: Left canal erythematous and edematous     Nose: Nose normal.      Mouth/Throat:      Mouth: Mucous membranes are moist.      Pharynx: Oropharynx is clear.   Eyes:      Extraocular Movements: Extraocular movements intact.      Conjunctiva/sclera: Conjunctivae normal.      Pupils: Pupils are equal, round, and reactive to light.   Cardiovascular:      Rate and Rhythm: Normal rate and regular rhythm.      Heart sounds: Normal heart sounds.   Pulmonary:      Effort: Pulmonary effort is normal.      Breath sounds: Normal breath sounds.   Skin:     General: Skin is warm and dry.   Neurological:      General: No focal deficit present.      Mental Status: She is alert and oriented to person, place, and time.   Psychiatric:         Mood and Affect: Mood normal.         Behavior: Behavior normal.         Thought Content: Thought content normal.                         Assessment and Plan   Diagnoses and all orders for this visit:    1. Medicare annual wellness visit, subsequent (Primary)    2. Type 2 diabetes mellitus with hyperglycemia, without long-term current use of insulin  Assessment & Plan:  Poorly controlled on most recent labs.  Continue Ozempic, Jardiance, metformin.  Patient would like to stay on GLP-1 despite stomach upset.  Repeat A1c today, will determine further plan of care based on results.  Will send " CGM and supplies to pharmacy, patient struggling with checking her blood glucose levels at home.  Diabetic eye exam: 5/2024.  Diabetic foot exam: 6/2023. Urine microalbumin: 5/2024. Renal protection: ACE. Pneumonia vaccination: Up to date.     Orders:  -     CBC & Differential  -     Comprehensive Metabolic Panel  -     Hemoglobin A1c  -     Lipid Panel  -     TSH    3. Essential hypertension  Assessment & Plan:  Well controlled today, will continue off of lisinopril as recommended by nephrology.      4. Hyperlipidemia, unspecified hyperlipidemia type  Assessment & Plan:  Well controlled, continue statin. Most recent LDL 47. Lipid panel with labs.        5. Hypothyroidism, unspecified type  Assessment & Plan:  Well controlled on previous labs, continue Synthroid. Thyroid profile in clinic today. Will adjust medication based on results of labs.     Orders:  -     T4, free    6. Vitamin D deficiency  Assessment & Plan:  Continue supplement.    Orders:  -     Vitamin D,25-Hydroxy    7. Stage 3a chronic kidney disease  Assessment & Plan:  She will continue to follow with nephrology as scheduled.      8. Gastroesophageal reflux disease, unspecified whether esophagitis present  Assessment & Plan:  Well controlled, continue esomeprazole.      9. Hot flashes  Assessment & Plan:  Continue Effexor and estradiol, patient aware of risk of HRT.      10. Allergic rhinitis, unspecified seasonality, unspecified trigger  Assessment & Plan:  Continue Zyrtec and Singulair.  Will send Ciprodex drops to pharmacy for left ear.      11. Ear pain, left    12. Constipation, unspecified constipation type  Assessment & Plan:  Miralax as needed.      Other orders  -     polyethylene glycol (MiraLax) 17 GM/SCOOP powder; Take 17 g by mouth Daily.  Dispense: 510 g; Refill: 0  -     ciprofloxacin-dexAMETHasone (Ciprodex) 0.3-0.1 % otic suspension; Administer 4 drops into the left ear 2 (Two) Times a Day for 7 days.  Dispense: 7.5 mL; Refill: 0  -      Continuous Glucose  (Dexcom G6 ) device; Use 1 each 1 (One) Time for 1 dose.  Dispense: 1 each; Refill: 0  -     Continuous Glucose Transmitter (Dexcom G6 Transmitter) misc; Use 1 each 1 (One) Time for 1 dose.  Dispense: 1 each; Refill: 0  -     Continuous Glucose Sensor (Dexcom G6 Sensor); Use Every 10 (Ten) Days.  Dispense: 9 each; Refill: 1             Follow Up   Return in about 6 months (around 12/18/2024).  Patient was given instructions and counseling regarding her condition or for health maintenance advice. Please see specific information pulled into the AVS if appropriate.

## 2024-07-02 DIAGNOSIS — E11.65 TYPE 2 DIABETES MELLITUS WITH HYPERGLYCEMIA, WITHOUT LONG-TERM CURRENT USE OF INSULIN: ICD-10-CM

## 2024-07-02 RX ORDER — SEMAGLUTIDE 0.68 MG/ML
INJECTION, SOLUTION SUBCUTANEOUS
Qty: 3 ML | Refills: 0 | Status: SHIPPED | OUTPATIENT
Start: 2024-07-02

## 2024-07-05 RX ORDER — VENLAFAXINE 75 MG/1
75 TABLET ORAL DAILY
Qty: 90 TABLET | Refills: 1 | Status: SHIPPED | OUTPATIENT
Start: 2024-07-05

## 2024-07-08 RX ORDER — CETIRIZINE HYDROCHLORIDE 10 MG/1
10 TABLET ORAL DAILY
Qty: 90 TABLET | Refills: 1 | Status: SHIPPED | OUTPATIENT
Start: 2024-07-08

## 2024-07-08 RX ORDER — MONTELUKAST SODIUM 10 MG/1
10 TABLET ORAL NIGHTLY
Qty: 90 TABLET | Refills: 1 | Status: SHIPPED | OUTPATIENT
Start: 2024-07-08

## 2024-07-22 RX ORDER — ATORVASTATIN CALCIUM 10 MG/1
10 TABLET, FILM COATED ORAL NIGHTLY
Qty: 90 TABLET | Refills: 1 | Status: SHIPPED | OUTPATIENT
Start: 2024-07-22

## 2024-08-06 ENCOUNTER — OFFICE VISIT (OUTPATIENT)
Dept: ORTHOPEDIC SURGERY | Facility: CLINIC | Age: 84
End: 2024-08-06
Payer: MEDICARE

## 2024-08-06 VITALS — WEIGHT: 157 LBS | HEIGHT: 63 IN | TEMPERATURE: 98 F | BODY MASS INDEX: 27.82 KG/M2

## 2024-08-06 DIAGNOSIS — M17.11 PRIMARY OSTEOARTHRITIS OF RIGHT KNEE: ICD-10-CM

## 2024-08-06 DIAGNOSIS — M25.561 RIGHT KNEE PAIN, UNSPECIFIED CHRONICITY: Primary | ICD-10-CM

## 2024-08-06 PROCEDURE — 1160F RVW MEDS BY RX/DR IN RCRD: CPT | Performed by: NURSE PRACTITIONER

## 2024-08-06 PROCEDURE — 73562 X-RAY EXAM OF KNEE 3: CPT | Performed by: NURSE PRACTITIONER

## 2024-08-06 PROCEDURE — 1159F MED LIST DOCD IN RCRD: CPT | Performed by: NURSE PRACTITIONER

## 2024-08-06 PROCEDURE — 20610 DRAIN/INJ JOINT/BURSA W/O US: CPT | Performed by: NURSE PRACTITIONER

## 2024-08-06 RX ORDER — METHYLPREDNISOLONE ACETATE 80 MG/ML
1 INJECTION, SUSPENSION INTRA-ARTICULAR; INTRALESIONAL; INTRAMUSCULAR; SOFT TISSUE
Status: COMPLETED | OUTPATIENT
Start: 2024-08-06 | End: 2024-08-06

## 2024-08-06 RX ORDER — LIDOCAINE HYDROCHLORIDE 10 MG/ML
2 INJECTION, SOLUTION EPIDURAL; INFILTRATION; INTRACAUDAL; PERINEURAL
Status: COMPLETED | OUTPATIENT
Start: 2024-08-06 | End: 2024-08-06

## 2024-08-06 RX ADMIN — METHYLPREDNISOLONE ACETATE 1 ML: 80 INJECTION, SUSPENSION INTRA-ARTICULAR; INTRALESIONAL; INTRAMUSCULAR; SOFT TISSUE at 11:15

## 2024-08-06 RX ADMIN — LIDOCAINE HYDROCHLORIDE 2 ML: 10 INJECTION, SOLUTION EPIDURAL; INFILTRATION; INTRACAUDAL; PERINEURAL at 11:15

## 2024-08-06 NOTE — PROGRESS NOTES
Patient: Griselda Henderson  YOB: 1940  Date of Service: 8/6/2024    Chief Complaints:   Chief Complaint   Patient presents with    Right Knee - Pain       Subjective: Patient has right knee pain with arthritis and desires conservative treatment injections have worked well for her and she like to repeat that    History of Present Illness: Pt is seen in the office today with complaints of   Chief Complaint   Patient presents with    Right Knee - Pain   .  KNEE: TIMING:  The pain is described as ACUTE on CHRONIC.  LOCATION: medial joint line tenderness. AGGRAVATING FACTORS:  Is worsened by prolonged standing, sitting, walking and squatting activities.  ASSOCIATED SYMPTOMS:  swelling, tenderness, and aching.    This problem is not new to this examiner.     Allergies:   Allergies   Allergen Reactions    Penicillins Hives    Adhesive Tape Rash       Medications:   Home Medications:  Current Outpatient Medications on File Prior to Visit   Medication Sig    Aspirin Buf,CaCarb-MgCarb-MgO, 81 MG tablet Take 81 mg by mouth Daily.    atorvastatin (LIPITOR) 10 MG tablet TAKE 1 TABLET BY MOUTH EVERY NIGHT    cetirizine (zyrTEC) 10 MG tablet TAKE 1 TABLET BY MOUTH DAILY    Cholecalciferol (Vitamin D) 50 MCG (2000 UT) tablet Take 1 tablet by mouth Daily.    Continuous Glucose Sensor (Dexcom G6 Sensor) Use Every 10 (Ten) Days.    cyclobenzaprine (FLEXERIL) 5 MG tablet Take 1 tablet by mouth 3 (Three) Times a Day As Needed for Muscle Spasms.    Diclofenac Sodium (VOLTAREN) 1 % gel gel APPLY TOPICALLY TO THE AFFECTED AREA FOUR TIMES DAILY AS DIRECTED    esomeprazole (nexIUM) 40 MG capsule Take 1 capsule by mouth Daily.    estradiol (ESTRACE) 0.5 MG tablet TAKE 1 TABLET BY MOUTH DAILY    glucose blood test strip Test blood glucose levels four times daily; Contour Next Test Strips    Jardiance 25 MG tablet tablet Take 1 tablet by mouth Daily.    levothyroxine (SYNTHROID, LEVOTHROID) 50 MCG tablet TAKE 1 TABLET BY MOUTH EVERY  MORNING    magnesium oxide (MAG-OX) 400 MG tablet Take 1 tablet by mouth Daily.    metFORMIN (GLUCOPHAGE) 1000 MG tablet TAKE 1 TABLET BY MOUTH TWICE DAILY    montelukast (SINGULAIR) 10 MG tablet TAKE 1 TABLET BY MOUTH EVERY NIGHT    multivitamin (THERAGRAN) tablet tablet Take 1 tablet by mouth.    polyethylene glycol (MiraLax) 17 GM/SCOOP powder Take 17 g by mouth Daily.    rOPINIRole (REQUIP) 0.5 MG tablet TAKE 1 TABLET BY MOUTH EVERY NIGHT 1 HOUR BEFORE BEDTIME    Semaglutide,0.25 or 0.5MG/DOS, (Ozempic, 0.25 or 0.5 MG/DOSE,) 2 MG/3ML solution pen-injector INJECT 0.5 MG UNDER THE SKIN INTO THE APPROPRIATE AREA AS DIRECTED ONE TIME PER WEEK FOR 90 DAYS    venlafaxine (EFFEXOR) 75 MG tablet TAKE 1 TABLET BY MOUTH DAILY     No current facility-administered medications on file prior to visit.     Current Medications:  Scheduled Meds:  Continuous Infusions:No current facility-administered medications for this visit.    PRN Meds:.    I have reviewed the patient's medical history in detail and updated the computerized patient record.  Review and summarization of old records include:    Past Medical History:   Diagnosis Date    Allergic     Arthritis of back 2022    Arthritis of neck ?    Basal cell carcinoma     2008,10/1/2012,10/30/2012    Cataract     Cervical disc disorder 1999?    Chronic allergic rhinitis     Condition not found     hernia unspecified    COPD (chronic obstructive pulmonary disease)     Deafness     hearing aid    Diabetes     GERD (gastroesophageal reflux disease)     Heart murmur     Hemorrhoid     Hip arthrosis ?    Hypertension     Knee swelling ?    Low back pain 30 years ago    Having extreme back pain currently.    Lumbosacral disc disease ?    Night sweats     Renal insufficiency 2019        Past Surgical History:   Procedure Laterality Date    ABDOMINAL HYSTERECTOMY  1981    ADENOIDECTOMY  1959    APPENDECTOMY      BREAST SURGERY  1970    CATARACT EXTRACTION Bilateral 01/23/2012     1/23/2012- left eye; 3/5/2012- right eye    COLON RESECTION  01/27/2020    closed loop bowel obstruction removed    ESSURE TUBAL LIGATION  2012 2/22/2012- right leg venous closure; 2/29/2012- left leg venous closure; 3/7/2012 bilateral leg back closure    FASCIOTOMY Right     9/2004; right heel    HEEL SPUR SURGERY      1/2004    HERNIA REPAIR  2012    PLANTAR FASCIECTOMY Left 11/09/2011    foot    SKIN CANCER EXCISION      TONSILLECTOMY  1959    TRIGGER POINT INJECTION  ?    Many back injections over the years        Social History     Occupational History    Not on file   Tobacco Use    Smoking status: Never     Passive exposure: Never    Smokeless tobacco: Never   Vaping Use    Vaping status: Never Used   Substance and Sexual Activity    Alcohol use: Never    Drug use: Never    Sexual activity: Not Currently     Partners: Male     Birth control/protection: Vasectomy, Hysterectomy      Social History     Social History Narrative    Not on file        Family History   Problem Relation Age of Onset    Heart disease Mother     Heart valve disorder Mother         mitral valve prolapse    Stroke Father     Diabetes Father     Diabetes Brother        ROS: 14 point review of systems was performed and was negative except for documented findings in HPI and today's encounter.     Allergies:   Allergies   Allergen Reactions    Penicillins Hives    Adhesive Tape Rash     Constitutional:  Denies fever, shaking or chills   Eyes:  Denies change in visual acuity   HENT:  Denies nasal congestion or sore throat   Respiratory:  Denies cough or shortness of breath   Cardiovascular:  Denies chest pain or severe LE edema   GI:  Denies abdominal pain, nausea, vomiting, bloody stools or diarrhea   Musculoskeletal:  Numbness, tingling, or loss of motor function only as noted above in history of present illness.  : Denies painful urination or hematuria  Integument:  Denies rash, lesion or ulceration   Neurologic:  Denies headache or  focal weakness  Endocrine:  Denies lymphadenopathy  Psych:  Denies confusion or change in mental status   Hem:  Denies active bleeding      Physical Exam: 83 y.o. female  Wt Readings from Last 3 Encounters:   08/06/24 71.2 kg (157 lb)   06/18/24 71.9 kg (158 lb 9.6 oz)   04/30/24 74.2 kg (163 lb 9.6 oz)       Body mass index is 27.81 kg/m².  Facility age limit for growth %sean is 20 years.  Vitals:    08/06/24 1056   Temp: 98 °F (36.7 °C)     Vital signs reviewed.   General Appearance:    Alert, cooperative, in no acute distress                  Eyes: conjunctiva clear  ENT: external ears and nose atraumatic  CV: no peripheral edema  Resp: normal respiratory effort  Skin: no rashes or wounds; normal turgor  Psych: mood and affect appropriate  Lymph: no nodes appreciated  Neuro: gross sensation intact  Vascular:  Palpable peripheral pulse in noted extremity  Musculoskeletal Extremities: KNEE Exam: medial and lateral joint line tenderness with crepitation, synovitis, swelling, and joint effusion right knee.      Diagnostic Data:  Imaging done today and discussed with the patient:    Indication: pain related symptoms,  Views: 3V AP, LAT & 40 degree PA right knee(s)   Findings: advanced arthritis  Comparison views: viewed last xray done in the office.      Procedure:  Large Joint Arthrocentesis: R knee  Date/Time: 8/6/2024 11:15 AM  Consent given by: patient  Site marked: site marked  Timeout: Immediately prior to procedure a time out was called to verify the correct patient, procedure, equipment, support staff and site/side marked as required   Supporting Documentation  Indications: pain, joint swelling and diagnostic evaluation   Procedure Details  Location: knee - R knee  Preparation: Patient was prepped and draped in the usual sterile fashion  Needle gauge: 21G.  Medications administered: 1 mL methylPREDNISolone acetate 80 MG/ML; 2 mL lidocaine PF 1% 1 %  Patient tolerance: patient tolerated the procedure well with  no immediate complications        Assessment:     ICD-10-CM ICD-9-CM   1. Right knee pain, unspecified chronicity  M25.561 719.46   2. Primary osteoarthritis of right knee  M17.11 715.16       Plan:   Follow up as indicated.  Ice, elevate, and rest as needed.  The diagnosis(es), natural history, pathophysiology and treatment for diagnosis(es) were discussed. Opportunity given and questions answered.  Biomechanics of pertinent body areas discussed.  When appropriate, the use of ambulatory aids discussed.  EXERCISES:  Advice on benefits of, and types of regular/moderate exercise pertaining to orthopedic diagnosis(es).  MEDICATIONS:  The risks, benefits, warnings,side effects and alternatives of medications discussed.  Inflammation/pain control; with cold, heat, elevation and/or liniments discussed as appropriate  Cortisone Injection. See procedure note.  MEDICAL RECORDS reviewed from other provider(s) for past and current medical history pertinent to this complaint.    8/6/2024

## 2024-08-19 NOTE — ASSESSMENT & PLAN NOTE
Problem: At Risk for Falls  Goal: Patient does not fall  Outcome: Monitoring/Evaluating progress  Goal: Patient takes action to control fall-related risks  Outcome: Monitoring/Evaluating progress     Problem: At Risk for Injury Due to Fall  Goal: Patient does not fall  Outcome: Monitoring/Evaluating progress  Goal: Takes action to control condition specific risks  Outcome: Monitoring/Evaluating progress  Goal: Verbalizes understanding of fall-related injury personal risks  Description: Document education using the patient education activity  Outcome: Monitoring/Evaluating progress     Problem: Impaired Physical Mobility  Goal: Functional status is maintained or returned to baseline during hospitalization  Outcome: Monitoring/Evaluating progress  Goal: Tolerates activity for discharge setting with no abnormal symptoms  Outcome: Monitoring/Evaluating progress     Problem: Self Care Deficit  Goal: # Functional status is maintained or returned to baseline - REHAB ONLY  Outcome: Monitoring/Evaluating progress  Goal: Functional status is maintained or returned to baseline  Outcome: Monitoring/Evaluating progress  Goal: # Tolerates activity for d/c setting with no clinical problems  Outcome: Monitoring/Evaluating progress      Continue Singulair, Zyrtec.

## 2024-08-22 DIAGNOSIS — E11.65 TYPE 2 DIABETES MELLITUS WITH HYPERGLYCEMIA, WITHOUT LONG-TERM CURRENT USE OF INSULIN: ICD-10-CM

## 2024-08-23 RX ORDER — SEMAGLUTIDE 0.68 MG/ML
0.25 INJECTION, SOLUTION SUBCUTANEOUS WEEKLY
Qty: 3 ML | Refills: 1 | Status: SHIPPED | OUTPATIENT
Start: 2024-08-23

## 2024-09-04 ENCOUNTER — OFFICE VISIT (OUTPATIENT)
Dept: INTERNAL MEDICINE | Facility: CLINIC | Age: 84
End: 2024-09-04
Payer: MEDICARE

## 2024-09-04 VITALS
HEART RATE: 102 BPM | BODY MASS INDEX: 26.9 KG/M2 | WEIGHT: 151.8 LBS | TEMPERATURE: 97.6 F | SYSTOLIC BLOOD PRESSURE: 124 MMHG | HEIGHT: 63 IN | DIASTOLIC BLOOD PRESSURE: 70 MMHG | OXYGEN SATURATION: 99 %

## 2024-09-04 DIAGNOSIS — N18.31 STAGE 3A CHRONIC KIDNEY DISEASE: ICD-10-CM

## 2024-09-04 DIAGNOSIS — E78.5 HYPERLIPIDEMIA, UNSPECIFIED HYPERLIPIDEMIA TYPE: ICD-10-CM

## 2024-09-04 DIAGNOSIS — E55.9 VITAMIN D DEFICIENCY: ICD-10-CM

## 2024-09-04 DIAGNOSIS — E03.9 HYPOTHYROIDISM, UNSPECIFIED TYPE: ICD-10-CM

## 2024-09-04 DIAGNOSIS — N64.4 BREAST PAIN, LEFT: Primary | ICD-10-CM

## 2024-09-04 DIAGNOSIS — E11.65 TYPE 2 DIABETES MELLITUS WITH HYPERGLYCEMIA, WITHOUT LONG-TERM CURRENT USE OF INSULIN: ICD-10-CM

## 2024-09-04 LAB
25(OH)D3 SERPL-MCNC: 54.8 NG/ML (ref 30–100)
ALBUMIN SERPL-MCNC: 4.1 G/DL (ref 3.5–5.2)
ALBUMIN/GLOB SERPL: 1.6 G/DL
ALP SERPL-CCNC: 61 U/L (ref 39–117)
ALT SERPL W P-5'-P-CCNC: 13 U/L (ref 1–33)
ANION GAP SERPL CALCULATED.3IONS-SCNC: 11 MMOL/L (ref 5–15)
AST SERPL-CCNC: 18 U/L (ref 1–32)
BASOPHILS # BLD AUTO: 0.06 10*3/MM3 (ref 0–0.2)
BASOPHILS NFR BLD AUTO: 1.1 % (ref 0–1.5)
BILIRUB SERPL-MCNC: 0.3 MG/DL (ref 0–1.2)
BUN SERPL-MCNC: 26 MG/DL (ref 8–23)
BUN/CREAT SERPL: 21.1 (ref 7–25)
CALCIUM SPEC-SCNC: 9.6 MG/DL (ref 8.6–10.5)
CHLORIDE SERPL-SCNC: 101 MMOL/L (ref 98–107)
CHOLEST SERPL-MCNC: 126 MG/DL (ref 0–200)
CO2 SERPL-SCNC: 26 MMOL/L (ref 22–29)
CREAT SERPL-MCNC: 1.23 MG/DL (ref 0.57–1)
DEPRECATED RDW RBC AUTO: 41 FL (ref 37–54)
EGFRCR SERPLBLD CKD-EPI 2021: 44.2 ML/MIN/1.73
EOSINOPHIL # BLD AUTO: 0.66 10*3/MM3 (ref 0–0.4)
EOSINOPHIL NFR BLD AUTO: 11.6 % (ref 0.3–6.2)
ERYTHROCYTE [DISTWIDTH] IN BLOOD BY AUTOMATED COUNT: 12.5 % (ref 12.3–15.4)
GLOBULIN UR ELPH-MCNC: 2.6 GM/DL
GLUCOSE SERPL-MCNC: 169 MG/DL (ref 65–99)
HBA1C MFR BLD: 8 % (ref 4.8–5.6)
HCT VFR BLD AUTO: 43.4 % (ref 34–46.6)
HDLC SERPL-MCNC: 67 MG/DL (ref 40–60)
HGB BLD-MCNC: 14.1 G/DL (ref 12–15.9)
IMM GRANULOCYTES # BLD AUTO: 0.01 10*3/MM3 (ref 0–0.05)
IMM GRANULOCYTES NFR BLD AUTO: 0.2 % (ref 0–0.5)
LDLC SERPL CALC-MCNC: 36 MG/DL (ref 0–100)
LDLC/HDLC SERPL: 0.47 {RATIO}
LYMPHOCYTES # BLD AUTO: 1.25 10*3/MM3 (ref 0.7–3.1)
LYMPHOCYTES NFR BLD AUTO: 22 % (ref 19.6–45.3)
MCH RBC QN AUTO: 29.7 PG (ref 26.6–33)
MCHC RBC AUTO-ENTMCNC: 32.5 G/DL (ref 31.5–35.7)
MCV RBC AUTO: 91.4 FL (ref 79–97)
MONOCYTES # BLD AUTO: 0.61 10*3/MM3 (ref 0.1–0.9)
MONOCYTES NFR BLD AUTO: 10.8 % (ref 5–12)
NEUTROPHILS NFR BLD AUTO: 3.08 10*3/MM3 (ref 1.7–7)
NEUTROPHILS NFR BLD AUTO: 54.3 % (ref 42.7–76)
NRBC BLD AUTO-RTO: 0 /100 WBC (ref 0–0.2)
PLATELET # BLD AUTO: 242 10*3/MM3 (ref 140–450)
PMV BLD AUTO: 11 FL (ref 6–12)
POTASSIUM SERPL-SCNC: 4.9 MMOL/L (ref 3.5–5.2)
PROT SERPL-MCNC: 6.7 G/DL (ref 6–8.5)
RBC # BLD AUTO: 4.75 10*6/MM3 (ref 3.77–5.28)
SODIUM SERPL-SCNC: 138 MMOL/L (ref 136–145)
T4 FREE SERPL-MCNC: 1.26 NG/DL (ref 0.92–1.68)
TRIGL SERPL-MCNC: 138 MG/DL (ref 0–150)
TSH SERPL DL<=0.05 MIU/L-ACNC: 3.2 UIU/ML (ref 0.27–4.2)
VLDLC SERPL-MCNC: 23 MG/DL (ref 5–40)
WBC NRBC COR # BLD AUTO: 5.67 10*3/MM3 (ref 3.4–10.8)

## 2024-09-04 PROCEDURE — 80053 COMPREHEN METABOLIC PANEL: CPT | Performed by: NURSE PRACTITIONER

## 2024-09-04 PROCEDURE — 85025 COMPLETE CBC W/AUTO DIFF WBC: CPT | Performed by: NURSE PRACTITIONER

## 2024-09-04 PROCEDURE — 82306 VITAMIN D 25 HYDROXY: CPT | Performed by: NURSE PRACTITIONER

## 2024-09-04 PROCEDURE — 83036 HEMOGLOBIN GLYCOSYLATED A1C: CPT | Performed by: NURSE PRACTITIONER

## 2024-09-04 PROCEDURE — 84439 ASSAY OF FREE THYROXINE: CPT | Performed by: NURSE PRACTITIONER

## 2024-09-04 PROCEDURE — 80061 LIPID PANEL: CPT | Performed by: NURSE PRACTITIONER

## 2024-09-04 PROCEDURE — 84443 ASSAY THYROID STIM HORMONE: CPT | Performed by: NURSE PRACTITIONER

## 2024-09-04 RX ORDER — EMPAGLIFLOZIN 25 MG/1
25 TABLET, FILM COATED ORAL DAILY
Qty: 90 TABLET | Refills: 1 | Status: SHIPPED | OUTPATIENT
Start: 2024-09-04

## 2024-09-04 NOTE — PROGRESS NOTES
"Chief Complaint  Breast Mass (Sore/spot on left side of breast. Very tender and was  very sore.)    Subjective      Griselda Henderson is a 83 y.o. female who presents to [facility]     Patient reports left breast pain for 1 month.  States she initially thought this was related to her bra despite buying new bras. It has improved somewhat but remains.  Patient reports pain is near the scar from where she had a benign fatty tumor removed over 40 years ago.  Normal mammogram 6/2023.  Patient reports the tenderness does feel like it runs up towards her left axilla.  Denies skin changes, open sores or lesion, nipple changes or discharge.    Patient would like to get her routine labs while in clinic today.  Admits her glucose levels have increased slightly on certain days, typically averaging around 150.  Currently managed with Ozempic, Jardiance, metformin.    Objective   Vital Signs:   Vitals:    09/04/24 1014   BP: 124/70   BP Location: Left arm   Patient Position: Sitting   Cuff Size: Adult   Pulse: 102   Temp: 97.6 °F (36.4 °C)   TempSrc: Temporal   SpO2: 99%   Weight: 68.9 kg (151 lb 12.8 oz)   Height: 160 cm (62.99\")     Body mass index is 26.9 kg/m².    Wt Readings from Last 3 Encounters:   09/04/24 68.9 kg (151 lb 12.8 oz)   08/06/24 71.2 kg (157 lb)   06/18/24 71.9 kg (158 lb 9.6 oz)     BP Readings from Last 3 Encounters:   09/04/24 124/70   06/18/24 126/66   12/06/23 125/66       Health Maintenance   Topic Date Due    DXA SCAN  06/14/2024    COVID-19 Vaccine (9 - 2023-24 season) 09/01/2024    INFLUENZA VACCINE  08/01/2024    TDAP/TD VACCINES (1 - Tdap) 06/18/2025 (Originally 10/19/1959)    HEMOGLOBIN A1C  12/18/2024    DIABETIC EYE EXAM  05/06/2025    URINE MICROALBUMIN  05/09/2025    ANNUAL WELLNESS VISIT  06/18/2025    LIPID PANEL  06/18/2025    BMI FOLLOWUP  08/23/2025    RSV Vaccine - Adults  Completed    Pneumococcal Vaccine 65+  Completed    ZOSTER VACCINE  " Completed       Physical Exam  Constitutional:       Appearance: Normal appearance.   HENT:      Head: Normocephalic and atraumatic.      Nose: Nose normal.      Mouth/Throat:      Mouth: Mucous membranes are moist.      Pharynx: Oropharynx is clear.   Eyes:      Extraocular Movements: Extraocular movements intact.      Conjunctiva/sclera: Conjunctivae normal.      Pupils: Pupils are equal, round, and reactive to light.   Cardiovascular:      Rate and Rhythm: Normal rate and regular rhythm.      Heart sounds: Normal heart sounds.   Pulmonary:      Effort: Pulmonary effort is normal.      Breath sounds: Normal breath sounds.   Chest:       Skin:     General: Skin is warm and dry.   Neurological:      General: No focal deficit present.      Mental Status: She is alert and oriented to person, place, and time.   Psychiatric:         Mood and Affect: Mood normal.         Behavior: Behavior normal.         Thought Content: Thought content normal.          Result Review :  The following data was reviewed by: KAT Willingham on 09/04/2024:  A1C Last 3 Results          12/6/2023    12:52 6/18/2024    11:48   HGBA1C Last 3 Results   Hemoglobin A1C 8.70  8.00           Procedures          Assessment & Plan  Breast pain, left  Will schedule for diagnostic mammogram and ultrasound for further evaluation.  Type 2 diabetes mellitus with hyperglycemia, without long-term current use of insulin  Repeat labs today.  Continue metformin, Ozempic, Jardiance.  She should continue to monitor blood glucose levels and notify clinic with consistent elevations or lows.  Hyperlipidemia, unspecified hyperlipidemia type  Lipid panel with labs.  Hypothyroidism, unspecified type  Continue Synthroid, thyroid labs today.  Vitamin D deficiency  Continue supplement, vitamin D level with labs.  Stage 3a chronic kidney disease  CMP with labs.    Orders Placed This Encounter   Procedures    Mammo Diagnostic Digital Tomosynthesis Bilateral With CAD     US Breast Left Limited    Comprehensive Metabolic Panel    Hemoglobin A1c    Lipid Panel    TSH    T4, Free    Vitamin D,25-Hydroxy    CBC Auto Differential    CBC & Differential     New Medications Ordered This Visit   Medications    Jardiance 25 MG tablet tablet     Sig: Take 1 tablet by mouth Daily.     Dispense:  90 tablet     Refill:  1                    FOLLOW UP  No follow-ups on file.  Patient was given instructions and counseling regarding her condition or for health maintenance advice. Please see specific information pulled into the AVS if appropriate.       Dory KAT Ca  09/04/24  14:52 EDT    CURRENT & DISCONTINUED MEDICATIONS  Current Outpatient Medications   Medication Instructions    Aspirin Buf,CaCarb-MgCarb-MgO, 81 MG tablet 81 mg, Oral, Daily    atorvastatin (LIPITOR) 10 mg, Oral, Nightly    cetirizine (ZYRTEC) 10 mg, Oral, Daily    Cholecalciferol (Vitamin D) 50 MCG (2000 UT) tablet 1 tablet, Oral, Daily    cyclobenzaprine (FLEXERIL) 5 mg, Oral, 3 Times Daily PRN    Diclofenac Sodium (VOLTAREN) 1 % gel gel APPLY TOPICALLY TO THE AFFECTED AREA FOUR TIMES DAILY AS DIRECTED    esomeprazole (NEXIUM) 40 mg, Oral, Daily    estradiol (ESTRACE) 0.5 mg, Oral, Daily    glucose blood test strip Test blood glucose levels four times daily; Contour Next Test Strips    Jardiance 25 mg, Oral, Daily    levothyroxine (SYNTHROID, LEVOTHROID) 50 mcg, Oral, Every Early Morning    magnesium oxide (MAG-OX) 400 mg, Oral, Daily    metFORMIN (GLUCOPHAGE) 1000 MG tablet TAKE 1 TABLET BY MOUTH TWICE DAILY    montelukast (SINGULAIR) 10 mg, Oral, Nightly    multivitamin (THERAGRAN) tablet tablet 1 tablet, Oral    Ozempic (0.25 or 0.5 MG/DOSE) 0.25 mg, Subcutaneous, Weekly    polyethylene glycol (MIRALAX) 17 g, Oral, Daily    rOPINIRole (REQUIP) 0.5 mg, Oral, Nightly, Take 1 hour before bedtime    venlafaxine (EFFEXOR) 75 mg, Oral, Daily       Medications Discontinued During This Encounter   Medication Reason     Jardiance 25 MG tablet tablet Reorder    Continuous Glucose Sensor (Dexcom G6 Sensor)

## 2024-09-04 NOTE — ASSESSMENT & PLAN NOTE
Repeat labs today.  Continue metformin, Ozempic, Jardiance.  She should continue to monitor blood glucose levels and notify clinic with consistent elevations or lows.

## 2024-09-12 ENCOUNTER — HOSPITAL ENCOUNTER (OUTPATIENT)
Dept: MAMMOGRAPHY | Facility: HOSPITAL | Age: 84
Discharge: HOME | End: 2024-09-12
Payer: MEDICARE

## 2024-09-12 ENCOUNTER — HOSPITAL ENCOUNTER (OUTPATIENT)
Dept: ULTRASOUND IMAGING | Facility: HOSPITAL | Age: 84
Discharge: HOME | End: 2024-09-12
Payer: MEDICARE

## 2024-09-12 DIAGNOSIS — N64.4 BREAST PAIN, LEFT: ICD-10-CM

## 2024-09-12 PROCEDURE — G0279 TOMOSYNTHESIS, MAMMO: HCPCS

## 2024-09-12 PROCEDURE — 77066 DX MAMMO INCL CAD BI: CPT

## 2024-09-12 PROCEDURE — 76642 ULTRASOUND BREAST LIMITED: CPT

## 2024-10-01 RX ORDER — VENLAFAXINE 75 MG/1
75 TABLET ORAL DAILY
Qty: 90 TABLET | Refills: 1 | Status: SHIPPED | OUTPATIENT
Start: 2024-10-01

## 2024-10-16 RX ORDER — ROPINIROLE 1 MG/1
1 TABLET, FILM COATED ORAL NIGHTLY
Qty: 90 TABLET | Refills: 1 | Status: SHIPPED | OUTPATIENT
Start: 2024-10-16

## 2024-10-21 RX ORDER — ONDANSETRON 4 MG/1
4 TABLET, ORALLY DISINTEGRATING ORAL EVERY 8 HOURS PRN
Qty: 30 TABLET | Refills: 1 | Status: SHIPPED | OUTPATIENT
Start: 2024-10-21

## 2024-10-30 RX ORDER — ROPINIROLE 1 MG/1
1 TABLET, FILM COATED ORAL NIGHTLY
Qty: 90 TABLET | Refills: 1 | Status: SHIPPED | OUTPATIENT
Start: 2024-10-30

## 2024-11-07 ENCOUNTER — OFFICE VISIT (OUTPATIENT)
Dept: ORTHOPEDIC SURGERY | Facility: CLINIC | Age: 84
End: 2024-11-07
Payer: MEDICARE

## 2024-11-07 VITALS — HEIGHT: 63 IN | WEIGHT: 150 LBS | TEMPERATURE: 96.6 F | BODY MASS INDEX: 26.58 KG/M2

## 2024-11-07 DIAGNOSIS — M17.11 PRIMARY OSTEOARTHRITIS OF RIGHT KNEE: Primary | ICD-10-CM

## 2024-11-07 RX ORDER — LIDOCAINE HYDROCHLORIDE 10 MG/ML
2 INJECTION, SOLUTION EPIDURAL; INFILTRATION; INTRACAUDAL; PERINEURAL
Status: COMPLETED | OUTPATIENT
Start: 2024-11-07 | End: 2024-11-07

## 2024-11-07 RX ORDER — METHYLPREDNISOLONE ACETATE 80 MG/ML
1 INJECTION, SUSPENSION INTRA-ARTICULAR; INTRALESIONAL; INTRAMUSCULAR; SOFT TISSUE
Status: COMPLETED | OUTPATIENT
Start: 2024-11-07 | End: 2024-11-07

## 2024-11-07 RX ADMIN — LIDOCAINE HYDROCHLORIDE 2 ML: 10 INJECTION, SOLUTION EPIDURAL; INFILTRATION; INTRACAUDAL; PERINEURAL at 11:30

## 2024-11-07 RX ADMIN — METHYLPREDNISOLONE ACETATE 1 ML: 80 INJECTION, SUSPENSION INTRA-ARTICULAR; INTRALESIONAL; INTRAMUSCULAR; SOFT TISSUE at 11:30

## 2024-11-07 NOTE — PROGRESS NOTES
Patient: Griselda Henderson  YOB: 1940  Date of Service: 11/7/2024    Chief Complaints:   Chief Complaint   Patient presents with    Right Knee - Pain       Subjective:Here for pain in right knee and desires conservative treatment. Pt has pain going up and down steps.     History of Present Illness: Pt is seen in the office today with complaints of   Chief Complaint   Patient presents with    Right Knee - Pain   .  KNEE: TIMING:  The pain is described as ACUTE on CHRONIC.  LOCATION: medial joint line tenderness. AGGRAVATING FACTORS:  Is worsened by prolonged standing, sitting, walking and squatting activities.  ASSOCIATED SYMPTOMS:  swelling, tenderness, and aching.    This problem is not new to this examiner.     Allergies:   Allergies   Allergen Reactions    Penicillins Hives    Adhesive Tape Rash       Medications:   Home Medications:  Current Outpatient Medications on File Prior to Visit   Medication Sig    Aspirin Buf,CaCarb-MgCarb-MgO, 81 MG tablet Take 81 mg by mouth Daily.    atorvastatin (LIPITOR) 10 MG tablet TAKE 1 TABLET BY MOUTH EVERY NIGHT    cetirizine (zyrTEC) 10 MG tablet TAKE 1 TABLET BY MOUTH DAILY    Cholecalciferol (Vitamin D) 50 MCG (2000 UT) tablet Take 1 tablet by mouth Daily.    Diclofenac Sodium (VOLTAREN) 1 % gel gel APPLY TOPICALLY TO THE AFFECTED AREA FOUR TIMES DAILY AS DIRECTED    esomeprazole (nexIUM) 40 MG capsule Take 1 capsule by mouth Daily.    estradiol (ESTRACE) 0.5 MG tablet TAKE 1 TABLET BY MOUTH DAILY    glucose blood test strip Test blood glucose levels four times daily; Contour Next Test Strips    Jardiance 25 MG tablet tablet Take 1 tablet by mouth Daily.    levothyroxine (SYNTHROID, LEVOTHROID) 50 MCG tablet TAKE 1 TABLET BY MOUTH EVERY MORNING    magnesium oxide (MAG-OX) 400 MG tablet Take 1 tablet by mouth Daily.    metFORMIN (GLUCOPHAGE) 1000 MG tablet TAKE 1 TABLET BY MOUTH TWICE DAILY    montelukast (SINGULAIR) 10 MG tablet TAKE 1 TABLET BY MOUTH EVERY  NIGHT    multivitamin (THERAGRAN) tablet tablet Take 1 tablet by mouth.    ondansetron ODT (ZOFRAN-ODT) 4 MG disintegrating tablet Place 1 tablet on the tongue Every 8 (Eight) Hours As Needed for Nausea or Vomiting.    polyethylene glycol (MiraLax) 17 GM/SCOOP powder Take 17 g by mouth Daily.    rOPINIRole (REQUIP) 1 MG tablet Take 1 tablet by mouth Every Night. Take 1 hour before bedtime    Semaglutide,0.25 or 0.5MG/DOS, (Ozempic, 0.25 or 0.5 MG/DOSE,) 2 MG/3ML solution pen-injector Inject 0.25 mg under the skin into the appropriate area as directed 1 (One) Time Per Week.    venlafaxine (EFFEXOR) 75 MG tablet Take 1 tablet by mouth Daily.    cyclobenzaprine (FLEXERIL) 5 MG tablet Take 1 tablet by mouth 3 (Three) Times a Day As Needed for Muscle Spasms.     No current facility-administered medications on file prior to visit.     Current Medications:  Scheduled Meds:  Continuous Infusions:No current facility-administered medications for this visit.    PRN Meds:.    I have reviewed the patient's medical history in detail and updated the computerized patient record.  Review and summarization of old records include:    Past Medical History:   Diagnosis Date    Allergic     Arthritis of back 2022    Arthritis of neck ?    Basal cell carcinoma     2008,10/1/2012,10/30/2012    Cataract     Cervical disc disorder 1999?    Chronic allergic rhinitis     Condition not found     hernia unspecified    COPD (chronic obstructive pulmonary disease)     Deafness     hearing aid    Diabetes     GERD (gastroesophageal reflux disease)     Heart murmur     Hemorrhoid     Hip arthrosis ?    Hypertension     Knee swelling ?    Low back pain 30 years ago    Having extreme back pain currently.    Lumbosacral disc disease ?    Night sweats     Renal insufficiency 2019        Past Surgical History:   Procedure Laterality Date    ABDOMINAL HYSTERECTOMY  1981    ADENOIDECTOMY  1959    APPENDECTOMY      BREAST SURGERY  1970    CATARACT  EXTRACTION Bilateral 01/23/2012 1/23/2012- left eye; 3/5/2012- right eye    COLON RESECTION  01/27/2020    closed loop bowel obstruction removed    ESSURE TUBAL LIGATION  2012 2/22/2012- right leg venous closure; 2/29/2012- left leg venous closure; 3/7/2012 bilateral leg back closure    FASCIOTOMY Right     9/2004; right heel    HEEL SPUR SURGERY      1/2004    HERNIA REPAIR  2012    PLANTAR FASCIECTOMY Left 11/09/2011    foot    SKIN CANCER EXCISION      TONSILLECTOMY  1959    TRIGGER POINT INJECTION  ?    Many back injections over the years        Social History     Occupational History    Not on file   Tobacco Use    Smoking status: Never     Passive exposure: Never    Smokeless tobacco: Never   Vaping Use    Vaping status: Never Used   Substance and Sexual Activity    Alcohol use: Never    Drug use: Never    Sexual activity: Not Currently     Partners: Male     Birth control/protection: Vasectomy, Hysterectomy      Social History     Social History Narrative    Not on file        Family History   Problem Relation Age of Onset    Heart disease Mother     Heart valve disorder Mother         mitral valve prolapse    Stroke Father     Diabetes Father     Diabetes Brother        ROS: 14 point review of systems was performed and was negative except for documented findings in HPI and today's encounter.     Allergies:   Allergies   Allergen Reactions    Penicillins Hives    Adhesive Tape Rash     Constitutional:  Denies fever, shaking or chills   Eyes:  Denies change in visual acuity   HENT:  Denies nasal congestion or sore throat   Respiratory:  Denies cough or shortness of breath   Cardiovascular:  Denies chest pain or severe LE edema   GI:  Denies abdominal pain, nausea, vomiting, bloody stools or diarrhea   Musculoskeletal:  Numbness, tingling, or loss of motor function only as noted above in history of present illness.  : Denies painful urination or hematuria  Integument:  Denies rash, lesion or ulceration    Neurologic:  Denies headache or focal weakness  Endocrine:  Denies lymphadenopathy  Psych:  Denies confusion or change in mental status   Hem:  Denies active bleeding      Physical Exam: 84 y.o. female  Wt Readings from Last 3 Encounters:   11/07/24 68 kg (150 lb)   09/04/24 68.9 kg (151 lb 12.8 oz)   08/06/24 71.2 kg (157 lb)       Body mass index is 26.57 kg/m².  Facility age limit for growth %sean is 20 years.  Vitals:    11/07/24 1124   Temp: 96.6 °F (35.9 °C)     Vital signs reviewed.   General Appearance:    Alert, cooperative, in no acute distress                  Eyes: conjunctiva clear  ENT: external ears and nose atraumatic  CV: no peripheral edema  Resp: normal respiratory effort  Skin: no rashes or wounds; normal turgor  Psych: mood and affect appropriate  Lymph: no nodes appreciated  Neuro: gross sensation intact  Vascular:  Palpable peripheral pulse in noted extremity  Musculoskeletal Extremities:   KNEE Exam: medial and lateral joint line tenderness with crepitation, synovitis, swelling, and joint effusion right knee.    Diagnostic Data:  Imaging done previously in the office and discussed with the patient:    Procedure:  Large Joint Arthrocentesis: R knee  Date/Time: 11/7/2024 11:30 AM  Consent given by: patient  Site marked: site marked  Timeout: Immediately prior to procedure a time out was called to verify the correct patient, procedure, equipment, support staff and site/side marked as required   Supporting Documentation  Indications: pain   Procedure Details  Location: knee - R knee  Preparation: Patient was prepped and draped in the usual sterile fashion  Needle gauge: 21G.  Medications administered: 1 mL methylPREDNISolone acetate 80 MG/ML; 2 mL lidocaine PF 1% 1 %  Patient tolerance: patient tolerated the procedure well with no immediate complications        Assessment:     ICD-10-CM ICD-9-CM   1. Primary osteoarthritis of right knee  M17.11 715.16       Plan:   Follow up as indicated.  Ice,  elevate, and rest as needed.  The diagnosis(es), natural history, pathophysiology and treatment for diagnosis(es) were discussed. Opportunity given and questions answered.  Biomechanics of pertinent body areas discussed.  When appropriate, the use of ambulatory aids discussed.  EXERCISES:  Advice on benefits of, and types of regular/moderate exercise pertaining to orthopedic diagnosis(es).  MEDICATIONS:  The risks, benefits, warnings,side effects and alternatives of medications discussed.  Inflammation/pain control; with cold, heat, elevation and/or liniments discussed as appropriate  HOME EXERCISE/PT program encouraged  MEDICAL RECORDS reviewed from other provider(s) for past and current medical history pertinent to this complaint.    11/7/2024

## 2024-12-10 RX ORDER — EMPAGLIFLOZIN 25 MG/1
25 TABLET, FILM COATED ORAL DAILY
Qty: 90 TABLET | Refills: 1 | Status: SHIPPED | OUTPATIENT
Start: 2024-12-10

## 2024-12-13 RX ORDER — ESTRADIOL 0.5 MG/1
0.5 TABLET ORAL DAILY
Qty: 90 TABLET | Refills: 1 | Status: SHIPPED | OUTPATIENT
Start: 2024-12-13

## 2024-12-16 ENCOUNTER — OFFICE VISIT (OUTPATIENT)
Dept: INTERNAL MEDICINE | Facility: CLINIC | Age: 84
End: 2024-12-16
Payer: MEDICARE

## 2024-12-16 VITALS
RESPIRATION RATE: 16 BRPM | DIASTOLIC BLOOD PRESSURE: 66 MMHG | WEIGHT: 146.6 LBS | HEIGHT: 63 IN | OXYGEN SATURATION: 100 % | TEMPERATURE: 97.5 F | SYSTOLIC BLOOD PRESSURE: 118 MMHG | BODY MASS INDEX: 25.98 KG/M2 | HEART RATE: 85 BPM

## 2024-12-16 DIAGNOSIS — E78.5 HYPERLIPIDEMIA, UNSPECIFIED HYPERLIPIDEMIA TYPE: ICD-10-CM

## 2024-12-16 DIAGNOSIS — E11.65 TYPE 2 DIABETES MELLITUS WITH HYPERGLYCEMIA, WITHOUT LONG-TERM CURRENT USE OF INSULIN: Primary | ICD-10-CM

## 2024-12-16 DIAGNOSIS — E03.9 HYPOTHYROIDISM, UNSPECIFIED TYPE: ICD-10-CM

## 2024-12-16 DIAGNOSIS — R23.2 HOT FLASHES: ICD-10-CM

## 2024-12-16 DIAGNOSIS — K21.9 GASTROESOPHAGEAL REFLUX DISEASE, UNSPECIFIED WHETHER ESOPHAGITIS PRESENT: ICD-10-CM

## 2024-12-16 DIAGNOSIS — N18.31 STAGE 3A CHRONIC KIDNEY DISEASE: ICD-10-CM

## 2024-12-16 DIAGNOSIS — E55.9 VITAMIN D DEFICIENCY: ICD-10-CM

## 2024-12-16 DIAGNOSIS — J30.9 ALLERGIC RHINITIS, UNSPECIFIED SEASONALITY, UNSPECIFIED TRIGGER: ICD-10-CM

## 2024-12-16 LAB
25(OH)D3 SERPL-MCNC: 54.8 NG/ML (ref 30–100)
ALBUMIN SERPL-MCNC: 4 G/DL (ref 3.5–5.2)
ALBUMIN/GLOB SERPL: 1.4 G/DL
ALP SERPL-CCNC: 60 U/L (ref 39–117)
ALT SERPL W P-5'-P-CCNC: 14 U/L (ref 1–33)
ANION GAP SERPL CALCULATED.3IONS-SCNC: 12.5 MMOL/L (ref 5–15)
AST SERPL-CCNC: 19 U/L (ref 1–32)
BASOPHILS # BLD AUTO: 0.06 10*3/MM3 (ref 0–0.2)
BASOPHILS NFR BLD AUTO: 0.9 % (ref 0–1.5)
BILIRUB SERPL-MCNC: 0.3 MG/DL (ref 0–1.2)
BUN SERPL-MCNC: 33 MG/DL (ref 8–23)
BUN/CREAT SERPL: 25.8 (ref 7–25)
CALCIUM SPEC-SCNC: 9.7 MG/DL (ref 8.6–10.5)
CHLORIDE SERPL-SCNC: 99 MMOL/L (ref 98–107)
CHOLEST SERPL-MCNC: 143 MG/DL (ref 0–200)
CO2 SERPL-SCNC: 24.5 MMOL/L (ref 22–29)
CREAT SERPL-MCNC: 1.28 MG/DL (ref 0.57–1)
DEPRECATED RDW RBC AUTO: 41.9 FL (ref 37–54)
EGFRCR SERPLBLD CKD-EPI 2021: 41.9 ML/MIN/1.73
EOSINOPHIL # BLD AUTO: 0.32 10*3/MM3 (ref 0–0.4)
EOSINOPHIL NFR BLD AUTO: 5 % (ref 0.3–6.2)
ERYTHROCYTE [DISTWIDTH] IN BLOOD BY AUTOMATED COUNT: 13 % (ref 12.3–15.4)
GLOBULIN UR ELPH-MCNC: 2.9 GM/DL
GLUCOSE SERPL-MCNC: 234 MG/DL (ref 65–99)
HBA1C MFR BLD: 8.2 % (ref 4.8–5.6)
HCT VFR BLD AUTO: 43.5 % (ref 34–46.6)
HDLC SERPL-MCNC: 66 MG/DL (ref 40–60)
HGB BLD-MCNC: 14.3 G/DL (ref 12–15.9)
IMM GRANULOCYTES # BLD AUTO: 0.02 10*3/MM3 (ref 0–0.05)
IMM GRANULOCYTES NFR BLD AUTO: 0.3 % (ref 0–0.5)
LDLC SERPL CALC-MCNC: 56 MG/DL (ref 0–100)
LDLC/HDLC SERPL: 0.81 {RATIO}
LYMPHOCYTES # BLD AUTO: 1.29 10*3/MM3 (ref 0.7–3.1)
LYMPHOCYTES NFR BLD AUTO: 20 % (ref 19.6–45.3)
MCH RBC QN AUTO: 29.2 PG (ref 26.6–33)
MCHC RBC AUTO-ENTMCNC: 32.9 G/DL (ref 31.5–35.7)
MCV RBC AUTO: 89 FL (ref 79–97)
MONOCYTES # BLD AUTO: 0.67 10*3/MM3 (ref 0.1–0.9)
MONOCYTES NFR BLD AUTO: 10.4 % (ref 5–12)
NEUTROPHILS NFR BLD AUTO: 4.08 10*3/MM3 (ref 1.7–7)
NEUTROPHILS NFR BLD AUTO: 63.4 % (ref 42.7–76)
NRBC BLD AUTO-RTO: 0 /100 WBC (ref 0–0.2)
PLATELET # BLD AUTO: 337 10*3/MM3 (ref 140–450)
PMV BLD AUTO: 10.4 FL (ref 6–12)
POTASSIUM SERPL-SCNC: 4.6 MMOL/L (ref 3.5–5.2)
PROT SERPL-MCNC: 6.9 G/DL (ref 6–8.5)
RBC # BLD AUTO: 4.89 10*6/MM3 (ref 3.77–5.28)
SODIUM SERPL-SCNC: 136 MMOL/L (ref 136–145)
T4 FREE SERPL-MCNC: 1.4 NG/DL (ref 0.92–1.68)
TRIGL SERPL-MCNC: 118 MG/DL (ref 0–150)
TSH SERPL DL<=0.05 MIU/L-ACNC: 1.68 UIU/ML (ref 0.27–4.2)
VLDLC SERPL-MCNC: 21 MG/DL (ref 5–40)
WBC NRBC COR # BLD AUTO: 6.44 10*3/MM3 (ref 3.4–10.8)

## 2024-12-16 PROCEDURE — 36415 COLL VENOUS BLD VENIPUNCTURE: CPT | Performed by: NURSE PRACTITIONER

## 2024-12-16 PROCEDURE — 80053 COMPREHEN METABOLIC PANEL: CPT | Performed by: NURSE PRACTITIONER

## 2024-12-16 PROCEDURE — 1160F RVW MEDS BY RX/DR IN RCRD: CPT | Performed by: NURSE PRACTITIONER

## 2024-12-16 PROCEDURE — 84439 ASSAY OF FREE THYROXINE: CPT | Performed by: NURSE PRACTITIONER

## 2024-12-16 PROCEDURE — 84443 ASSAY THYROID STIM HORMONE: CPT | Performed by: NURSE PRACTITIONER

## 2024-12-16 PROCEDURE — 99214 OFFICE O/P EST MOD 30 MIN: CPT | Performed by: NURSE PRACTITIONER

## 2024-12-16 PROCEDURE — 80061 LIPID PANEL: CPT | Performed by: NURSE PRACTITIONER

## 2024-12-16 PROCEDURE — G2211 COMPLEX E/M VISIT ADD ON: HCPCS | Performed by: NURSE PRACTITIONER

## 2024-12-16 PROCEDURE — 1125F AMNT PAIN NOTED PAIN PRSNT: CPT | Performed by: NURSE PRACTITIONER

## 2024-12-16 PROCEDURE — 3074F SYST BP LT 130 MM HG: CPT | Performed by: NURSE PRACTITIONER

## 2024-12-16 PROCEDURE — 82306 VITAMIN D 25 HYDROXY: CPT | Performed by: NURSE PRACTITIONER

## 2024-12-16 PROCEDURE — 3078F DIAST BP <80 MM HG: CPT | Performed by: NURSE PRACTITIONER

## 2024-12-16 PROCEDURE — 1159F MED LIST DOCD IN RCRD: CPT | Performed by: NURSE PRACTITIONER

## 2024-12-16 PROCEDURE — 85025 COMPLETE CBC W/AUTO DIFF WBC: CPT | Performed by: NURSE PRACTITIONER

## 2024-12-16 PROCEDURE — 83036 HEMOGLOBIN GLYCOSYLATED A1C: CPT | Performed by: NURSE PRACTITIONER

## 2024-12-16 RX ORDER — CETIRIZINE HYDROCHLORIDE 10 MG/1
10 TABLET ORAL DAILY
Qty: 90 TABLET | Refills: 1 | Status: SHIPPED | OUTPATIENT
Start: 2024-12-16

## 2024-12-16 RX ORDER — ATORVASTATIN CALCIUM 10 MG/1
10 TABLET, FILM COATED ORAL NIGHTLY
Qty: 90 TABLET | Refills: 1 | Status: SHIPPED | OUTPATIENT
Start: 2024-12-16

## 2024-12-16 RX ORDER — ORAL SEMAGLUTIDE 3 MG/1
3 TABLET ORAL DAILY
Qty: 30 TABLET | Refills: 1 | Status: SHIPPED | OUTPATIENT
Start: 2024-12-16

## 2024-12-16 NOTE — ASSESSMENT & PLAN NOTE
Continue Effexor and estradiol. Patient aware of risks of long term use of HRT, declines cessation.

## 2024-12-16 NOTE — ASSESSMENT & PLAN NOTE
Continue vitamin D supplement, vitamin D level with labs today.   Orders:    Vitamin D,25-Hydroxy    Vitamin D 25 hydroxy; Future

## 2024-12-16 NOTE — ASSESSMENT & PLAN NOTE
Poorly controlled but stable. Continue metformin and Jardiance. Will discontinue Ozempic and attempt to get Rybelsus covered due to negative side effects. She should monitor blood glucose levels closely and call or return to clinic with consistent elevations or frequent lows. Repeat labs in clinic today.  Diabetic eye exam: 5/2024.  Diabetic foot exam: 6/2023, due. Urine microalbumin: 5/2024. Renal protection: None, nephrology discontinued lisinopril. Pneumonia vaccination: Up to date.     Orders:    CBC & Differential    Comprehensive Metabolic Panel    Hemoglobin A1c    Lipid Panel    CBC w AUTO Differential; Future    Comprehensive metabolic panel; Future    Hemoglobin A1c; Future    Lipid panel; Future

## 2024-12-16 NOTE — PROGRESS NOTES
Chief Complaint  Diabetes    Subjective      Griselda Henderson is a 84 y.o. female who presents to [facility]     DM2-  Currently managed with Ozempic, Jardiance, Metformin.  Reports home blood glucose readings ranging 160s. Increased recently with snacking. Most recent A1c 8.0. Diabetic eye exam: 5/2024.  Diabetic foot exam: 6/2023, due. Urine microalbumin: 5/2024. Renal protection: None, nephrology discontinued lisinopril. Pneumonia vaccination: Up to date. Patient states she is concerned that she may have neuropathy, has tingling in her legs and isn't sure if this is always related to restless leg. Patient states she has cut back a lot on her portion sizes, has lost close to 30 pounds over the past year. Reports nausea and vomiting with the Ozempic, will take Zofran as needed and this will help. Also struggles with constipation. Vomiting is intermittent, sometimes a few times a week. Would like to try an alternative if possible. Had issues with Januvia coverage in the past.     GERD-  Well controlled with Nexium.      HLD-  Managed with statin.  Most recent LDL 47.     Hot flashes-  Managed with Effexor and estradiol, patient would like to continue with current medication regimen.      CKD-  Most recent GFR 44.2.  Scheduled for follow up with nephrology in February.      Hypothyroid-  Continues Synthroid, due for repeat labs today.     Vitamin D deficiency-  Continues oral supplement.    Restless leg-  Improved with increased dosage of Requip. Will still have nights where symptoms are worse, every few weeks.     Knee pain-  Orthopedic would like to pursue replacement, she would like to continue with injections until they stop working for her.      Allergic rhinitis-  Continues Zyrtec and Singulair.     Shingles vaccination: Up to date  COVID19 vaccination: Up to date   RSV vaccination: Up to date   Influenza vaccination: Up to date   Pneumonia vaccination: Up to  "date  Mammogram: 9/2024  DEXA: 6/2022 - declines repeat   Objective   Vital Signs:   Vitals:    12/16/24 1139   BP: 118/66   BP Location: Left arm   Patient Position: Sitting   Cuff Size: Adult   Pulse: 85   Resp: 16   Temp: 97.5 °F (36.4 °C)   TempSrc: Temporal   SpO2: 100%   Weight: 66.5 kg (146 lb 9.6 oz)   Height: 160 cm (63\")     Body mass index is 25.97 kg/m².    Wt Readings from Last 3 Encounters:   12/16/24 66.5 kg (146 lb 9.6 oz)   11/07/24 68 kg (150 lb)   09/04/24 68.9 kg (151 lb 12.8 oz)     BP Readings from Last 3 Encounters:   12/16/24 118/66   09/04/24 124/70   06/18/24 126/66       Health Maintenance   Topic Date Due    DIABETIC FOOT EXAM  06/07/2024    DXA SCAN  12/16/2024 (Originally 6/14/2024)    HEMOGLOBIN A1C  04/30/2025    DIABETIC EYE EXAM  05/06/2025    URINE MICROALBUMIN  05/09/2025    ANNUAL WELLNESS VISIT  06/18/2025    LIPID PANEL  09/04/2025    BMI FOLLOWUP  12/16/2025    TDAP/TD VACCINES (2 - Td or Tdap) 09/13/2034    COVID-19 Vaccine  Completed    RSV Vaccine - Adults  Completed    INFLUENZA VACCINE  Completed    Pneumococcal Vaccine 65+  Completed    ZOSTER VACCINE  Completed       Physical Exam  Constitutional:       Appearance: Normal appearance.   HENT:      Head: Normocephalic and atraumatic.      Nose: Nose normal.      Mouth/Throat:      Mouth: Mucous membranes are moist.      Pharynx: Oropharynx is clear.   Eyes:      Extraocular Movements: Extraocular movements intact.      Conjunctiva/sclera: Conjunctivae normal.      Pupils: Pupils are equal, round, and reactive to light.   Neck:      Thyroid: No thyroid mass, thyromegaly or thyroid tenderness.   Cardiovascular:      Rate and Rhythm: Normal rate and regular rhythm.      Heart sounds: Normal heart sounds.   Pulmonary:      Effort: Pulmonary effort is normal.      Breath sounds: Normal breath sounds.   Skin:     General: Skin is warm and dry.   Neurological:      General: No focal deficit present.      Mental Status: She is " alert and oriented to person, place, and time.   Psychiatric:         Mood and Affect: Mood normal.         Behavior: Behavior normal.         Thought Content: Thought content normal.          Result Review :  The following data was reviewed by: KAT Willingham on 12/16/2024:         Procedures          Assessment & Plan  Type 2 diabetes mellitus with hyperglycemia, without long-term current use of insulin  Poorly controlled but stable. Continue metformin and Jardiance. Will discontinue Ozempic and attempt to get Rybelsus covered due to negative side effects. She should monitor blood glucose levels closely and call or return to clinic with consistent elevations or frequent lows. Repeat labs in clinic today.  Diabetic eye exam: 5/2024.  Diabetic foot exam: 6/2023, due. Urine microalbumin: 5/2024. Renal protection: None, nephrology discontinued lisinopril. Pneumonia vaccination: Up to date.     Orders:    CBC & Differential    Comprehensive Metabolic Panel    Hemoglobin A1c    Lipid Panel    CBC w AUTO Differential; Future    Comprehensive metabolic panel; Future    Hemoglobin A1c; Future    Lipid panel; Future    Hyperlipidemia, unspecified hyperlipidemia type  Continue statin, lipid panel with labs.    Orders:    Lipid panel; Future    Hypothyroidism, unspecified type  Well controlled on previous labs, continue Synthroid. Thyroid profile in clinic today. Will adjust medication based on results of labs.  Orders:    TSH    T4, Free    TSH; Future    T4, free; Future    Vitamin D deficiency  Continue vitamin D supplement, vitamin D level with labs today.   Orders:    Vitamin D,25-Hydroxy    Vitamin D 25 hydroxy; Future    Stage 3a chronic kidney disease  CMP with labs.          Gastroesophageal reflux disease, unspecified whether esophagitis present  Well controlled, continue PPI.        Hot flashes  Continue Effexor and estradiol. Patient aware of risks of long term use of HRT, declines cessation.        Allergic  rhinitis, unspecified seasonality, unspecified trigger  Well controlled, continue Zyrtec and Singulair.            FOLLOW UP  Return in about 6 months (around 6/16/2025).  Patient was given instructions and counseling regarding her condition or for health maintenance advice. Please see specific information pulled into the AVS if appropriate.       Dory Ca, KAT  12/16/24  12:32 EST    CURRENT & DISCONTINUED MEDICATIONS  Current Outpatient Medications   Medication Instructions    Aspirin Buf,CaCarb-MgCarb-MgO, 81 MG tablet 81 mg, Daily    atorvastatin (LIPITOR) 10 mg, Oral, Nightly    cetirizine (ZYRTEC) 10 mg, Oral, Daily    Cholecalciferol (Vitamin D) 50 MCG (2000 UT) tablet 1 tablet, Daily    Diclofenac Sodium (VOLTAREN) 1 % gel gel Topical, 4 Times Daily    esomeprazole (NEXIUM) 40 mg, Oral, Daily    estradiol (ESTRACE) 0.5 mg, Oral, Daily    glucose blood test strip Test blood glucose levels four times daily; Contour Next Test Strips    Jardiance 25 mg, Oral, Daily    levothyroxine (SYNTHROID, LEVOTHROID) 50 mcg, Oral, Every Early Morning    magnesium oxide (MAG-OX) 400 mg, Daily    metFORMIN (GLUCOPHAGE) 1000 MG tablet TAKE 1 TABLET BY MOUTH TWICE DAILY    montelukast (SINGULAIR) 10 mg, Oral, Nightly    multivitamin (THERAGRAN) tablet tablet 1 tablet    ondansetron ODT (ZOFRAN-ODT) 4 mg, Translingual, Every 8 Hours PRN    polyethylene glycol (MIRALAX) 17 g, Oral, Daily    rOPINIRole (REQUIP) 1 mg, Oral, Nightly, Take 1 hour before bedtime    Rybelsus 3 mg, Oral, Daily    venlafaxine (EFFEXOR) 75 mg, Oral, Daily       Medications Discontinued During This Encounter   Medication Reason    cyclobenzaprine (FLEXERIL) 5 MG tablet     Semaglutide,0.25 or 0.5MG/DOS, (Ozempic, 0.25 or 0.5 MG/DOSE,) 2 MG/3ML solution pen-injector     glucose blood test strip Reorder    Diclofenac Sodium (VOLTAREN) 1 % gel gel Reorder    cetirizine (zyrTEC) 10 MG tablet Reorder    atorvastatin (LIPITOR) 10 MG tablet Reorder

## 2024-12-16 NOTE — ASSESSMENT & PLAN NOTE
Well controlled on previous labs, continue Synthroid. Thyroid profile in clinic today. Will adjust medication based on results of labs.  Orders:    TSH    T4, Free    TSH; Future    T4, free; Future     Composite Graft Text: The defect edges were debeveled with a #15 scalpel blade.  Given the location of the defect, shape of the defect, the proximity to free margins and the fact the defect was full thickness a composite graft was deemed most appropriate.  The defect was outline and then transferred to the donor site.  A full thickness graft was then excised from the donor site. The graft was then placed in the primary defect, oriented appropriately and then sutured into place.  The secondary defect was then repaired using a primary closure.

## 2024-12-19 RX ORDER — MONTELUKAST SODIUM 10 MG/1
10 TABLET ORAL NIGHTLY
Qty: 90 TABLET | Refills: 1 | Status: SHIPPED | OUTPATIENT
Start: 2024-12-19

## 2024-12-30 RX ORDER — CETIRIZINE HYDROCHLORIDE 10 MG/1
10 TABLET ORAL DAILY
Qty: 90 TABLET | Refills: 1 | OUTPATIENT
Start: 2024-12-30

## 2025-01-22 RX ORDER — EMPAGLIFLOZIN 25 MG/1
25 TABLET, FILM COATED ORAL DAILY
Qty: 90 TABLET | Refills: 1 | Status: SHIPPED | OUTPATIENT
Start: 2025-01-22

## 2025-01-22 NOTE — TELEPHONE ENCOUNTER
Patient called office stating she receive a letter for patient assistant for jardiance stating they were missing the prescription and providers signature, I have printed out prescription for provider.

## 2025-01-24 RX ORDER — ROPINIROLE 1 MG/1
1 TABLET, FILM COATED ORAL NIGHTLY
Qty: 90 TABLET | Refills: 1 | Status: SHIPPED | OUTPATIENT
Start: 2025-01-24

## 2025-01-27 RX ORDER — LEVOTHYROXINE SODIUM 50 UG/1
50 TABLET ORAL
Qty: 90 TABLET | Refills: 1 | Status: SHIPPED | OUTPATIENT
Start: 2025-01-27

## 2025-01-29 ENCOUNTER — TELEPHONE (OUTPATIENT)
Dept: INTERNAL MEDICINE | Facility: CLINIC | Age: 85
End: 2025-01-29
Payer: MEDICARE

## 2025-01-29 NOTE — TELEPHONE ENCOUNTER
Caller: EUGENIO ANGELO    Relationship: Other    Best call back number: [phone]    What form or medical record are you requesting: REVISED PAPERS FOR SCRIPT    Timeframe paperwork needed: AS SOON AS POSSIBLE     Additional notes:   THEY RECEIVED SCRIPT FOR JARDIANCE BUT WITH NO STRENGTH AND INSTRUCTIONS.

## 2025-02-06 ENCOUNTER — OFFICE VISIT (OUTPATIENT)
Dept: ORTHOPEDIC SURGERY | Facility: CLINIC | Age: 85
End: 2025-02-06
Payer: MEDICARE

## 2025-02-06 VITALS — TEMPERATURE: 98.2 F | WEIGHT: 146.2 LBS | BODY MASS INDEX: 28.7 KG/M2 | HEIGHT: 60 IN

## 2025-02-06 DIAGNOSIS — M17.11 PRIMARY OSTEOARTHRITIS OF RIGHT KNEE: Primary | ICD-10-CM

## 2025-02-06 RX ORDER — METHYLPREDNISOLONE ACETATE 80 MG/ML
80 INJECTION, SUSPENSION INTRA-ARTICULAR; INTRALESIONAL; INTRAMUSCULAR; SOFT TISSUE
Status: COMPLETED | OUTPATIENT
Start: 2025-02-06 | End: 2025-02-06

## 2025-02-06 RX ADMIN — METHYLPREDNISOLONE ACETATE 80 MG: 80 INJECTION, SUSPENSION INTRA-ARTICULAR; INTRALESIONAL; INTRAMUSCULAR; SOFT TISSUE at 11:48

## 2025-02-06 NOTE — PROGRESS NOTES
2/6/2025    Griselda Henderson is here today for worsening knee pain. Pt has undergone injection of the knee in the past with good resolution of symptoms. Pt is requesting a repeat injection.     KNEE Injection Procedure Note:    Large Joint Arthrocentesis: R knee  Date/Time: 2/6/2025 11:48 AM  Consent given by: patient  Site marked: site marked  Timeout: Immediately prior to procedure a time out was called to verify the correct patient, procedure, equipment, support staff and site/side marked as required   Supporting Documentation  Indications: pain and joint swelling   Procedure Details  Location: knee - R knee  Preparation: Patient was prepped and draped in the usual sterile fashion  Needle gauge: 21 G.  Approach: anterolateral  Medications administered: 2 mL lidocaine (cardiac); 80 mg methylPREDNISolone acetate 80 MG/ML  Patient tolerance: patient tolerated the procedure well with no immediate complications      At the conclusion of the injection I discussed the importance of continued quad strengthening exercises on a daily basis. I will see the patient back if the symptoms should fail to improve or worsen.    Ary Rivera, APRN  2/6/2025

## 2025-02-26 ENCOUNTER — LAB (OUTPATIENT)
Dept: LAB | Facility: HOSPITAL | Age: 85
End: 2025-02-26
Payer: MEDICARE

## 2025-02-26 DIAGNOSIS — E11.65 TYPE 2 DIABETES MELLITUS WITH HYPERGLYCEMIA, WITHOUT LONG-TERM CURRENT USE OF INSULIN: ICD-10-CM

## 2025-02-26 DIAGNOSIS — E55.9 VITAMIN D DEFICIENCY: ICD-10-CM

## 2025-02-26 DIAGNOSIS — E03.9 HYPOTHYROIDISM, UNSPECIFIED TYPE: ICD-10-CM

## 2025-02-26 DIAGNOSIS — E78.5 HYPERLIPIDEMIA, UNSPECIFIED HYPERLIPIDEMIA TYPE: ICD-10-CM

## 2025-02-26 LAB
25(OH)D3 SERPL-MCNC: 52.2 NG/ML (ref 30–100)
ALBUMIN SERPL-MCNC: 3.7 G/DL (ref 3.5–5.2)
ALBUMIN/GLOB SERPL: 1.2 G/DL
ALP SERPL-CCNC: 58 U/L (ref 39–117)
ALT SERPL W P-5'-P-CCNC: 17 U/L (ref 1–33)
ANION GAP SERPL CALCULATED.3IONS-SCNC: 11.9 MMOL/L (ref 5–15)
AST SERPL-CCNC: 19 U/L (ref 1–32)
BASOPHILS # BLD AUTO: 0.05 10*3/MM3 (ref 0–0.2)
BASOPHILS NFR BLD AUTO: 0.8 % (ref 0–1.5)
BILIRUB SERPL-MCNC: 0.4 MG/DL (ref 0–1.2)
BUN SERPL-MCNC: 23 MG/DL (ref 8–23)
BUN/CREAT SERPL: 19.3 (ref 7–25)
CALCIUM SPEC-SCNC: 9.4 MG/DL (ref 8.6–10.5)
CHLORIDE SERPL-SCNC: 108 MMOL/L (ref 98–107)
CHOLEST SERPL-MCNC: 137 MG/DL (ref 0–200)
CO2 SERPL-SCNC: 23.1 MMOL/L (ref 22–29)
CREAT SERPL-MCNC: 1.19 MG/DL (ref 0.57–1)
DEPRECATED RDW RBC AUTO: 42.6 FL (ref 37–54)
EGFRCR SERPLBLD CKD-EPI 2021: 45.7 ML/MIN/1.73
EOSINOPHIL # BLD AUTO: 0.25 10*3/MM3 (ref 0–0.4)
EOSINOPHIL NFR BLD AUTO: 4.1 % (ref 0.3–6.2)
ERYTHROCYTE [DISTWIDTH] IN BLOOD BY AUTOMATED COUNT: 12.7 % (ref 12.3–15.4)
GLOBULIN UR ELPH-MCNC: 3 GM/DL
GLUCOSE SERPL-MCNC: 179 MG/DL (ref 65–99)
HBA1C MFR BLD: 8.3 % (ref 4.8–5.6)
HCT VFR BLD AUTO: 43.6 % (ref 34–46.6)
HDLC SERPL-MCNC: 72 MG/DL (ref 40–60)
HGB BLD-MCNC: 13.6 G/DL (ref 12–15.9)
IMM GRANULOCYTES # BLD AUTO: 0.02 10*3/MM3 (ref 0–0.05)
IMM GRANULOCYTES NFR BLD AUTO: 0.3 % (ref 0–0.5)
LDLC SERPL CALC-MCNC: 45 MG/DL (ref 0–100)
LDLC/HDLC SERPL: 0.59 {RATIO}
LYMPHOCYTES # BLD AUTO: 1.76 10*3/MM3 (ref 0.7–3.1)
LYMPHOCYTES NFR BLD AUTO: 28.8 % (ref 19.6–45.3)
MCH RBC QN AUTO: 28.5 PG (ref 26.6–33)
MCHC RBC AUTO-ENTMCNC: 31.2 G/DL (ref 31.5–35.7)
MCV RBC AUTO: 91.2 FL (ref 79–97)
MONOCYTES # BLD AUTO: 0.67 10*3/MM3 (ref 0.1–0.9)
MONOCYTES NFR BLD AUTO: 10.9 % (ref 5–12)
NEUTROPHILS NFR BLD AUTO: 3.37 10*3/MM3 (ref 1.7–7)
NEUTROPHILS NFR BLD AUTO: 55.1 % (ref 42.7–76)
NRBC BLD AUTO-RTO: 0 /100 WBC (ref 0–0.2)
PLATELET # BLD AUTO: 272 10*3/MM3 (ref 140–450)
PMV BLD AUTO: 10 FL (ref 6–12)
POTASSIUM SERPL-SCNC: 4.5 MMOL/L (ref 3.5–5.2)
PROT SERPL-MCNC: 6.7 G/DL (ref 6–8.5)
RBC # BLD AUTO: 4.78 10*6/MM3 (ref 3.77–5.28)
SODIUM SERPL-SCNC: 143 MMOL/L (ref 136–145)
T4 FREE SERPL-MCNC: 1.34 NG/DL (ref 0.92–1.68)
TRIGL SERPL-MCNC: 114 MG/DL (ref 0–150)
TSH SERPL DL<=0.05 MIU/L-ACNC: 2.45 UIU/ML (ref 0.27–4.2)
VLDLC SERPL-MCNC: 20 MG/DL (ref 5–40)
WBC NRBC COR # BLD AUTO: 6.12 10*3/MM3 (ref 3.4–10.8)

## 2025-02-26 PROCEDURE — 85025 COMPLETE CBC W/AUTO DIFF WBC: CPT

## 2025-02-26 PROCEDURE — 80061 LIPID PANEL: CPT

## 2025-02-26 PROCEDURE — 36415 COLL VENOUS BLD VENIPUNCTURE: CPT

## 2025-02-26 PROCEDURE — 80053 COMPREHEN METABOLIC PANEL: CPT

## 2025-02-26 PROCEDURE — 82306 VITAMIN D 25 HYDROXY: CPT

## 2025-02-26 PROCEDURE — 84439 ASSAY OF FREE THYROXINE: CPT

## 2025-02-26 PROCEDURE — 84443 ASSAY THYROID STIM HORMONE: CPT

## 2025-02-26 PROCEDURE — 83036 HEMOGLOBIN GLYCOSYLATED A1C: CPT

## 2025-02-27 ENCOUNTER — HOSPITAL ENCOUNTER (EMERGENCY)
Facility: HOSPITAL | Age: 85
Discharge: HOME | End: 2025-02-27
Attending: EMERGENCY MEDICINE
Payer: MEDICARE

## 2025-02-27 ENCOUNTER — PATIENT MESSAGE (OUTPATIENT)
Dept: INTERNAL MEDICINE | Facility: CLINIC | Age: 85
End: 2025-02-27
Payer: MEDICARE

## 2025-02-27 ENCOUNTER — TELEPHONE (OUTPATIENT)
Dept: INTERNAL MEDICINE | Facility: CLINIC | Age: 85
End: 2025-02-27
Payer: MEDICARE

## 2025-02-27 ENCOUNTER — APPOINTMENT (OUTPATIENT)
Dept: GENERAL RADIOLOGY | Facility: HOSPITAL | Age: 85
End: 2025-02-27
Payer: MEDICARE

## 2025-02-27 VITALS
RESPIRATION RATE: 18 BRPM | HEIGHT: 63 IN | BODY MASS INDEX: 25.52 KG/M2 | TEMPERATURE: 97.3 F | WEIGHT: 144 LBS | OXYGEN SATURATION: 98 % | HEART RATE: 72 BPM | DIASTOLIC BLOOD PRESSURE: 98 MMHG | SYSTOLIC BLOOD PRESSURE: 143 MMHG

## 2025-02-27 DIAGNOSIS — S82.831A CLOSED FRACTURE OF DISTAL END OF RIGHT FIBULA, UNSPECIFIED FRACTURE MORPHOLOGY, INITIAL ENCOUNTER: Primary | ICD-10-CM

## 2025-02-27 DIAGNOSIS — S92.352A DISPLACED FRACTURE OF FIFTH METATARSAL BONE, LEFT FOOT, INITIAL ENCOUNTER FOR CLOSED FRACTURE: ICD-10-CM

## 2025-02-27 DIAGNOSIS — S82.831A CLOSED FRACTURE OF DISTAL END OF RIGHT FIBULA, UNSPECIFIED FRACTURE MORPHOLOGY, INITIAL ENCOUNTER: ICD-10-CM

## 2025-02-27 PROCEDURE — 73610 X-RAY EXAM OF ANKLE: CPT

## 2025-02-27 PROCEDURE — 99283 EMERGENCY DEPT VISIT LOW MDM: CPT

## 2025-02-27 RX ORDER — HYDROCODONE BITARTRATE AND ACETAMINOPHEN 5; 325 MG/1; MG/1
1 TABLET ORAL ONCE
Status: COMPLETED | OUTPATIENT
Start: 2025-02-27 | End: 2025-02-27

## 2025-02-27 RX ORDER — HYDROCODONE BITARTRATE AND ACETAMINOPHEN 5; 325 MG/1; MG/1
1 TABLET ORAL EVERY 6 HOURS PRN
Qty: 12 TABLET | Refills: 0 | Status: SHIPPED | OUTPATIENT
Start: 2025-02-27 | End: 2025-02-28 | Stop reason: SDUPTHER

## 2025-02-27 RX ADMIN — HYDROCODONE BITARTRATE AND ACETAMINOPHEN 1 TABLET: 5; 325 TABLET ORAL at 10:00

## 2025-02-27 NOTE — TELEPHONE ENCOUNTER
Patient gave verbal permission for Lilliam Beatriz  to  her Rybelsus Samples from Patient Assistance. I  will leave at the .

## 2025-02-27 NOTE — ED PROVIDER NOTES
EMERGENCY DEPARTMENT ENCOUNTER  Room Number:  30/30  PCP: Dory Ca APRN  Independent Historians: Patient      HPI:  Chief Complaint: had concerns including Fall.     A complete HPI/ROS/PMH/PSH/SH/FH are unobtainable due to: Nothing      Context: Griselda Henderson is a 84 y.o. female with a medical history of diabetes, CKD who presents to the ED c/o acute bilateral ankle pain after she fell down 1 step at her home prior to arrival.  She was able to get up and walk to the car in order to ride in the car here but when she got out of the car here was pretty painful to bear any weight.  She denies hitting her head or loss of consciousness.  She denies neck pain.  She states she may have twisted her back and she has some mild right lower back pain.  She denies any blood thinner medications.      Review of prior external notes (non-ED) -and- Review of prior external test results outside of this encounter: I reviewed orthopedics progress note from 2/6/2025 for patient was seen for large joint arthrocentesis of the right knee.        PAST MEDICAL HISTORY  Active Ambulatory Problems     Diagnosis Date Noted    Type 2 diabetes mellitus with hyperglycemia, without long-term current use of insulin 08/24/2021    Vitamin D deficiency 08/24/2021    Hyperlipidemia 08/24/2021    Allergic rhinitis 08/24/2021    Stage 3b chronic kidney disease 08/24/2021    Hypothyroidism 08/24/2021    Hot flashes 08/24/2021    Essential hypertension 11/29/2021    Gastroesophageal reflux disease 11/29/2021    Stage 3a chronic kidney disease 06/01/2022    Postmenopausal 06/01/2022    Abdominal hernia 12/05/2022    Basal cell carcinoma 12/05/2022    Cataract 12/05/2022    Deafness 12/05/2022    Heart murmur 12/05/2022    Hemorrhoid 12/05/2022    Hyperkalemia 11/04/2022    Lumbago with sciatica 10/26/2017    Scoliosis of lumbar spine 01/29/2018    Neck pain 10/26/2017    Night sweats 12/05/2022    Medicare annual wellness visit, subsequent 06/06/2023     Chronic pain of right knee 12/06/2023    Restless leg 12/06/2023    Constipation 06/18/2024     Resolved Ambulatory Problems     Diagnosis Date Noted    Elevated blood pressure reading in office without diagnosis of hypertension 08/24/2021    Dyslipidemia 11/04/2022     Past Medical History:   Diagnosis Date    Allergic     Arthritis of back 2022    Arthritis of neck ?    Cervical disc disorder 1999?    Chronic allergic rhinitis     Condition not found     COPD (chronic obstructive pulmonary disease)     Diabetes     GERD (gastroesophageal reflux disease)     Hip arthrosis ?    Hypertension     Knee swelling ?    Low back pain 30 years ago    Lumbosacral disc disease ?    Renal insufficiency 2019         PAST SURGICAL HISTORY  Past Surgical History:   Procedure Laterality Date    ABDOMINAL HYSTERECTOMY  1981    ADENOIDECTOMY  1959    APPENDECTOMY      BREAST SURGERY  1970    CATARACT EXTRACTION Bilateral 01/23/2012 1/23/2012- left eye; 3/5/2012- right eye    COLON RESECTION  01/27/2020    closed loop bowel obstruction removed    ESSURE TUBAL LIGATION  2012 2/22/2012- right leg venous closure; 2/29/2012- left leg venous closure; 3/7/2012 bilateral leg back closure    FASCIOTOMY Right     9/2004; right heel    HEEL SPUR SURGERY      1/2004    HERNIA REPAIR  2012    PLANTAR FASCIECTOMY Left 11/09/2011    foot    SKIN CANCER EXCISION      TONSILLECTOMY  1959    TRIGGER POINT INJECTION  ?    Many back injections over the years         FAMILY HISTORY  Family History   Problem Relation Age of Onset    Heart disease Mother     Heart valve disorder Mother         mitral valve prolapse    Stroke Father     Diabetes Father     Diabetes Brother          SOCIAL HISTORY  Social History     Socioeconomic History    Marital status:    Tobacco Use    Smoking status: Never     Passive exposure: Never    Smokeless tobacco: Never   Vaping Use    Vaping status: Never Used   Substance and Sexual Activity    Alcohol use:  Never    Drug use: Never    Sexual activity: Not Currently     Partners: Male     Birth control/protection: Vasectomy, Hysterectomy         ALLERGIES  Penicillins and Adhesive tape      REVIEW OF SYSTEMS  Review of all 14 systems is negative other than stated in the HPI above.      PHYSICAL EXAM    I have reviewed the triage vital signs and nursing notes.    ED Triage Vitals   Temp Heart Rate Resp BP SpO2   02/27/25 0901 02/27/25 0901 02/27/25 0901 02/27/25 0909 02/27/25 0901   97.3 °F (36.3 °C) 72 18 162/93 98 %      Temp src Heart Rate Source Patient Position BP Location FiO2 (%)   02/27/25 0901 -- 02/27/25 0909 02/27/25 0909 --   Tympanic  Lying Right arm          GENERAL: awake and alert, no acute distress  HENT: Normocephalic, atraumatic  EYES: no scleral icterus, pupils 3 mm reactive bilaterally, EOMI  CV: regular rhythm, regular rate  RESPIRATORY: normal effort  ABDOMEN: soft, nondistended, nontender throughout  MUSCULOSKELETAL: no deformity, no midline cervical spine tenderness or step-off.  There is no midline T or L-spine tenderness.  The pelvis is stable and nontender.  There is no proximal fibular tenderness bilaterally.  Patient has moderate soft tissue swelling over the left lateral malleolus with moderate point tenderness, no crepitus.  There is minimal soft tissue swelling over the right lateral malleolus with mild point tenderness.  There is no point tenderness about the feet bilaterally.  2+ DP pulses bilaterally.    NEURO: alert, moves all extremities, follows commands, normal sensation to light touch throughout bilateral lower extremities.  GCS 15  PSYCH: calm, cooperative  SKIN: Warm, dry, ecchymosis over the left lateral malleolus          LAB RESULTS  No results found for this or any previous visit (from the past 24 hours).    The above labs were ordered by me and independently reviewed by me.     RADIOLOGY  XR Ankle 3+ View Bilateral    Result Date: 2/27/2025  XR ANKLE 3+ VW BILATERAL-    INDICATION: Fall, bilateral ankle pain  COMPARISON: None  TECHNIQUE: 3 view left ankle and 3 view right ankle  FINDINGS:  Left ankle: Slightly displaced fracture seen at the base of the fifth metatarsal, with 3 mm displacement. Small ossifications seen adjacent to the lateral calcaneus on the AP radiograph. No dislocation. Symmetric mortise. Preserved tibiotalar joint. Lateral ankle soft tissue swelling.  Right ankle: Oblique slightly displaced lateral malleolus fracture, extends into the mortise, with 2 mm displacement. Plantar spur. No dislocation. Symmetric mortise. Preserved tibiotalar joint. Lateral ankle soft tissue swelling.       1. Slightly displaced fracture seen at the base of the left fifth metatarsal. 2. Slightly displaced right lateral malleolus fracture. 3. Small ossifications seen lateral to the left calcaneus on the AP radiograph, questionable for avulsions. Correlate with tenderness. 4. Left greater than right lateral ankle soft tissue swelling.  This report was finalized on 2/27/2025 10:43 AM by Dr. Medardo Buchanan M.D on Workstation: [id number]       The above radiology studies were ordered by me.  See ED course for independent interpretations.     MEDICATIONS GIVEN IN ER  Medications   HYDROcodone-acetaminophen (NORCO) 5-325 MG per tablet 1 tablet (1 tablet Oral Given 2/27/25 1000)         ORDERS PLACED DURING THIS VISIT:  Orders Placed This Encounter   Procedures    [facility] 11. Short CAM Boot (), 14. Crutches (); Right; Right    [facility] 13. Post Op Shoe (); Left    XR Ankle 3+ View Bilateral         OUTPATIENT MEDICATION MANAGEMENT:  No current Epic-ordered facility-administered medications on file.     Current Outpatient Medications Ordered in Epic   Medication Sig Dispense Refill    Aspirin Buf,CaCarb-MgCarb-MgO, 81 MG tablet Take 81 mg by mouth Daily.      atorvastatin (LIPITOR) 10 MG tablet Take 1 tablet by mouth Every Night. 90 tablet 1    cetirizine  (zyrTEC) 10 MG tablet Take 1 tablet by mouth Daily. 90 tablet 1    Cholecalciferol (Vitamin D) 50 MCG (2000 UT) tablet Take 1 tablet by mouth Daily.      Diclofenac Sodium (VOLTAREN) 1 % gel gel Apply  topically to the appropriate area as directed 4 (Four) Times a Day. 100 g 1    esomeprazole (nexIUM) 40 MG capsule Take 1 capsule by mouth Daily. 90 capsule 1    estradiol (ESTRACE) 0.5 MG tablet TAKE 1 TABLET BY MOUTH DAILY 90 tablet 1    glucose blood test strip Test blood glucose levels four times daily; Contour Next Test Strips 100 each 3    HYDROcodone-acetaminophen (NORCO) 5-325 MG per tablet Take 1 tablet by mouth Every 6 (Six) Hours As Needed for Moderate Pain. 12 tablet 0    Jardiance 25 MG tablet tablet Take 1 tablet by mouth Daily. 90 tablet 1    levothyroxine (SYNTHROID, LEVOTHROID) 50 MCG tablet TAKE 1 TABLET BY MOUTH EVERY MORNING 90 tablet 1    magnesium oxide (MAG-OX) 400 MG tablet Take 1 tablet by mouth Daily.      metFORMIN (GLUCOPHAGE) 1000 MG tablet TAKE 1 TABLET BY MOUTH TWICE DAILY 180 tablet 1    montelukast (SINGULAIR) 10 MG tablet TAKE 1 TABLET BY MOUTH EVERY NIGHT 90 tablet 1    multivitamin (THERAGRAN) tablet tablet Take 1 tablet by mouth.      ondansetron ODT (ZOFRAN-ODT) 4 MG disintegrating tablet Place 1 tablet on the tongue Every 8 (Eight) Hours As Needed for Nausea or Vomiting. 30 tablet 1    polyethylene glycol (MiraLax) 17 GM/SCOOP powder Take 17 g by mouth Daily. 510 g 0    rOPINIRole (REQUIP) 1 MG tablet TAKE 1 TABLET BY MOUTH EVERY NIGHT 1 HOUR BEFORE BEDTIME 90 tablet 1    Semaglutide (Rybelsus) 3 MG tablet Take 1 tablet by mouth Daily. 30 tablet 1    venlafaxine (EFFEXOR) 75 MG tablet Take 1 tablet by mouth Daily. 90 tablet 1         PROCEDURES  Procedures            PROGRESS, DATA ANALYSIS, CONSULTS, AND MEDICAL DECISION MAKING  All labs have been independently interpreted by me.  All radiology studies have been reviewed by me. All EKG's have been independently viewed and  interpreted by me.  Discussion below represents my analysis of pertinent findings related to patient's condition, differential diagnosis, treatment plan and final disposition.    Differential diagnosis includes but is not limited to:  Ankle fracture  Foot fracture  Ankle dislocation      Clinical Scores:                    Plain films of the bilateral ankles independently interpreted by me.  There is a minimally displaced fracture of the right distal fibula.  There is also a minimally displaced fracture involving the left fifth metatarsal base.      ED Course as of 02/27/25 1215   Thu Feb 27, 2025   1201 Patient was unable to ambulate with crutches however she was able to use a walker here.  I asked her to minimize weightbearing on the right lower extremity with the distal fibular fracture, may weight-bear as tolerated on the left metatarsal fracture.  She will be prescribed a short course of Norco as needed and advised to follow-up with orthopedics as outpatient for definitive care. [JR]      ED Course User Index  [JR] Kelton Donohue MD             AS OF 12:15 EST VITALS:    BP - 143/98  HR - 72  TEMP - 97.3 °F (36.3 °C) (Tympanic)  O2 SATS - 98%    COMPLEXITY OF CARE        Chronic or social conditions impacting patient care (Social Determinants of Health):     DIAGNOSIS  Final diagnoses:   Closed fracture of distal end of right fibula, unspecified fracture morphology, initial encounter   Displaced fracture of fifth metatarsal bone, left foot, initial encounter for closed fracture           DISPOSITION  DISCHARGE    Patient discharged in stable condition.    Reviewed implications of results, diagnosis, meds, responsibility to follow up, warning signs and symptoms of possible worsening, potential complications and reasons to return to ER.    Patient/Family voiced understanding of above instructions.    Discussed plan for discharge, as there is no emergent indication for admission. Patient referred to  primary care provider for BP management due to today's BP. Pt/family is agreeable and understands need for follow up and repeat testing.  Pt is aware that discharge does not mean that nothing is wrong but it indicates no emergency is present that requires admission and they must continue care with follow-up as given below or physician of their choice.     FOLLOW-UP  Madeleine Ji MD  [address] [id number] [phone]    Schedule an appointment as soon as possible for a visit            Medication List        New Prescriptions      HYDROcodone-acetaminophen 5-325 MG per tablet  Commonly known as: NORCO  Take 1 tablet by mouth Every 6 (Six) Hours As Needed for Moderate Pain.               Where to Get Your Medications        These medications were sent to [facility] - [place] [id number]      Hours: Monday to Friday 7 AM to 6 PM, Saturday & Sunday 8 AM to 4:30 PM (Closed 12 PM to 12:30 PM) Phone: [phone]   HYDROcodone-acetaminophen 5-325 MG per tablet             Prescription drug monitoring program review: SONIA reviewed by Kelton Donohue MD   N/A and SONIA query complete and reviewed. Treatment plan to include limited course of prescribed controlled substance. Risks including addiction, benefits, and alternatives presented to patient.      Please note that portions of this document were completed with a voice recognition program.    Note Disclaimer: At [facility], we believe that sharing information builds trust and better relationships. You are receiving this note because you recently visited [facility]. It is possible you will see health information before a provider has talked with you about it. This kind of information can be easy to misunderstand. To help you fully understand what it means for your health, we urge you to discuss this note with your provider.         Kelton Donohue MD  02/27/25 7213

## 2025-02-28 RX ORDER — HYDROCODONE BITARTRATE AND ACETAMINOPHEN 5; 325 MG/1; MG/1
1 TABLET ORAL EVERY 6 HOURS PRN
Qty: 28 TABLET | Refills: 0 | Status: SHIPPED | OUTPATIENT
Start: 2025-02-28

## 2025-03-12 RX ORDER — ESTRADIOL 0.5 MG/1
0.5 TABLET ORAL DAILY
Qty: 90 TABLET | Refills: 1 | Status: SHIPPED | OUTPATIENT
Start: 2025-03-12

## 2025-03-12 NOTE — TELEPHONE ENCOUNTER
Estrogen and Estroge Combinations Protocol Failed03/12/2025 12:13 PM   Protocol Details Up to date pap smear in [facility] Maintenance

## 2025-03-12 NOTE — TELEPHONE ENCOUNTER
Will fill but please call her and tell her that we would recommend she has a well woman exam if she is on estrogen she can schedule with gynecology or with us.

## 2025-03-12 NOTE — TELEPHONE ENCOUNTER
Patient notified of prescription sent and recommendations. Patient is currently unable to make an appointment for well woman due to one ankle in a cast and the other in splint due to missing the bottom stairs at home. Patient will schedule when able.

## 2025-03-25 ENCOUNTER — PATIENT MESSAGE (OUTPATIENT)
Dept: INTERNAL MEDICINE | Facility: CLINIC | Age: 85
End: 2025-03-25
Payer: MEDICARE

## 2025-03-27 RX ORDER — ROPINIROLE 2 MG/1
2 TABLET, FILM COATED ORAL NIGHTLY
Qty: 30 TABLET | Refills: 1 | Status: SHIPPED | OUTPATIENT
Start: 2025-03-27

## 2025-03-28 RX ORDER — VENLAFAXINE 75 MG/1
75 TABLET ORAL DAILY
Qty: 90 TABLET | Refills: 1 | Status: SHIPPED | OUTPATIENT
Start: 2025-03-28

## 2025-03-31 RX ORDER — VENLAFAXINE 75 MG/1
75 TABLET ORAL DAILY
Qty: 90 TABLET | Refills: 1 | OUTPATIENT
Start: 2025-03-31

## 2025-03-31 RX ORDER — MONTELUKAST SODIUM 10 MG/1
10 TABLET ORAL NIGHTLY
Qty: 90 TABLET | Refills: 1 | Status: SHIPPED | OUTPATIENT
Start: 2025-03-31

## 2025-04-02 RX ORDER — ROPINIROLE 2 MG/1
2 TABLET, FILM COATED ORAL NIGHTLY
Qty: 30 TABLET | Refills: 1 | Status: SHIPPED | OUTPATIENT
Start: 2025-04-02

## 2025-04-02 RX ORDER — ROPINIROLE 2 MG/1
2 TABLET, FILM COATED ORAL NIGHTLY
Qty: 90 TABLET | OUTPATIENT
Start: 2025-04-02

## 2025-04-10 ENCOUNTER — PATIENT MESSAGE (OUTPATIENT)
Dept: INTERNAL MEDICINE | Facility: CLINIC | Age: 85
End: 2025-04-10
Payer: MEDICARE

## 2025-04-10 DIAGNOSIS — E11.65 TYPE 2 DIABETES MELLITUS WITH HYPERGLYCEMIA, WITHOUT LONG-TERM CURRENT USE OF INSULIN: Primary | ICD-10-CM

## 2025-04-11 RX ORDER — ORAL SEMAGLUTIDE 3 MG/1
3 TABLET ORAL DAILY
Qty: 30 TABLET | Refills: 0 | COMMUNITY
Start: 2025-04-11

## 2025-04-24 RX ORDER — EMPAGLIFLOZIN 25 MG/1
25 TABLET, FILM COATED ORAL DAILY
Qty: 90 TABLET | Refills: 0 | Status: SHIPPED | OUTPATIENT
Start: 2025-04-24

## 2025-05-21 ENCOUNTER — TELEPHONE (OUTPATIENT)
Dept: ORTHOPEDIC SURGERY | Facility: CLINIC | Age: 85
End: 2025-05-21
Payer: MEDICARE

## 2025-05-21 NOTE — TELEPHONE ENCOUNTER
Called pt and left a message regarding her upcoming appt; [facility] will be out.  Pt can see her tomorrow or R/S with Dr Arreguin.

## 2025-05-27 ENCOUNTER — TELEPHONE (OUTPATIENT)
Dept: ORTHOPEDIC SURGERY | Facility: CLINIC | Age: 85
End: 2025-05-27
Payer: MEDICARE

## 2025-05-27 RX ORDER — ROPINIROLE 2 MG/1
2 TABLET, FILM COATED ORAL NIGHTLY
Qty: 30 TABLET | Refills: 1 | Status: SHIPPED | OUTPATIENT
Start: 2025-05-27

## 2025-05-27 RX ORDER — ROPINIROLE 2 MG/1
2 TABLET, FILM COATED ORAL NIGHTLY
Qty: 30 TABLET | Refills: 1 | OUTPATIENT
Start: 2025-05-27

## 2025-05-27 NOTE — TELEPHONE ENCOUNTER
Pt R/S appt with [facility]IM to see Ramirez on 5/29.  She is aware this will be at our EP office and location info was given.

## 2025-05-29 ENCOUNTER — OFFICE VISIT (OUTPATIENT)
Dept: ORTHOPEDIC SURGERY | Facility: CLINIC | Age: 85
End: 2025-05-29
Payer: MEDICARE

## 2025-05-29 VITALS — BODY MASS INDEX: 24.7 KG/M2 | TEMPERATURE: 98 F | WEIGHT: 139.4 LBS | HEIGHT: 63 IN

## 2025-05-29 DIAGNOSIS — M25.561 PAIN IN BOTH KNEES, UNSPECIFIED CHRONICITY: ICD-10-CM

## 2025-05-29 DIAGNOSIS — M17.0 PRIMARY OSTEOARTHRITIS OF BOTH KNEES: Primary | ICD-10-CM

## 2025-05-29 DIAGNOSIS — M25.562 PAIN IN BOTH KNEES, UNSPECIFIED CHRONICITY: ICD-10-CM

## 2025-05-29 RX ORDER — LIDOCAINE HYDROCHLORIDE 10 MG/ML
2 INJECTION, SOLUTION EPIDURAL; INFILTRATION; INTRACAUDAL; PERINEURAL
Status: COMPLETED | OUTPATIENT
Start: 2025-05-29 | End: 2025-05-29

## 2025-05-29 RX ADMIN — LIDOCAINE HYDROCHLORIDE 2 ML: 10 INJECTION, SOLUTION EPIDURAL; INFILTRATION; INTRACAUDAL; PERINEURAL at 12:00

## 2025-05-29 RX ADMIN — LIDOCAINE HYDROCHLORIDE 2 ML: 10 INJECTION, SOLUTION EPIDURAL; INFILTRATION; INTRACAUDAL; PERINEURAL at 11:58

## 2025-05-29 NOTE — PROGRESS NOTES
[facility]              New Problem      Patient Name: Griselda Henderson  : 1940  Primary Care Physician: Dory Ca APRN        Chief Complaint:  Bilateral knee pain    HPI:   Griselda Henderson is a 84 y.o. year old who presents today for evaluation. She has seen KAT Reece in the past for R knee pain and today she comes with reports of recurrent right knee pain as well as left knee pain. Her left knee is usually asymptomatic but she has been out working more in the garden thinks it may have aggravated her knee. She reports her pain is somewhat diffuse, anterior and medial. Worse with activity and better with rest. She has trouble bending and stairs but also straight forward walking.    History of DM with an A1c 8.3- she admits she has not been great about checking her sugars lately but was in the 160's last time she checked.   History of Present Illness      Past Medical/Surgical, Social and Family History:  I have reviewed and/or updated pertinent history as noted in the medical record including:  Past Medical History:   Diagnosis Date    Allergic     Ankle sprain 3/25    Badly sprained left ankle    Arthritis of back     Arthritis of neck ?    Basal cell carcinoma     ,10/1/2012,10/30/2012    Cataract     Cervical disc disorder ?    Chronic allergic rhinitis     Condition not found     hernia unspecified    COPD (chronic obstructive pulmonary disease)     Deafness     hearing aid    Diabetes     Fracture of ankle 3/25    See above    Fracture, foot 3/25    Broke right ankle & bone in left foot    GERD (gastroesophageal reflux disease)     Heart murmur     Hemorrhoid     Hip arthrosis ?    Hypertension     Knee swelling ?    Low back pain 30 years ago    Having extreme back pain currently.    Lumbosacral disc disease ?    Night sweats     Renal insufficiency      Past Surgical History:   Procedure Laterality Date    ABDOMINAL HYSTERECTOMY  1981    ADENOIDECTOMY      ANKLE  OPEN REDUCTION INTERNAL FIXATION  3/25    Broke bone in right ankle and bone on left foot    APPENDECTOMY  1955    BREAST SURGERY  1970    CATARACT EXTRACTION Bilateral 01/23/2012 1/23/2012- left eye; 3/5/2012- right eye    COLON RESECTION  01/27/2020    closed loop bowel obstruction removed    ESSURE TUBAL LIGATION  2012 2/22/2012- right leg venous closure; 2/29/2012- left leg venous closure; 3/7/2012 bilateral leg back closure    FASCIOTOMY Right     9/2004; right heel    FOOT SURGERY  3/25    See above    HEEL SPUR SURGERY      1/2004    HERNIA REPAIR  2012    PLANTAR FASCIECTOMY Left 11/09/2011    foot    SKIN CANCER EXCISION      TONSILLECTOMY  1959    TRIGGER POINT INJECTION  ?    Many back injections over the years     Social History     Occupational History    Not on file   Tobacco Use    Smoking status: Never     Passive exposure: Never    Smokeless tobacco: Never   Vaping Use    Vaping status: Never Used   Substance and Sexual Activity    Alcohol use: Never    Drug use: Never    Sexual activity: Not Currently     Partners: Male     Birth control/protection: Vasectomy, Hysterectomy          Allergies:   Allergies   Allergen Reactions    Penicillins Hives    Adhesive Tape Rash    Latex Rash       Medications:   Home Medications:  Current Outpatient Medications on File Prior to Visit   Medication Sig    Aspirin Buf,CaCarb-MgCarb-MgO, 81 MG tablet Take 81 mg by mouth Daily.    atorvastatin (LIPITOR) 10 MG tablet Take 1 tablet by mouth Every Night.    cetirizine (zyrTEC) 10 MG tablet Take 1 tablet by mouth Daily.    Cholecalciferol (Vitamin D) 50 MCG (2000 UT) tablet Take 1 tablet by mouth Daily.    Diclofenac Sodium (VOLTAREN) 1 % gel gel Apply  topically to the appropriate area as directed 4 (Four) Times a Day.    esomeprazole (nexIUM) 40 MG capsule Take 1 capsule by mouth Daily.    estradiol (ESTRACE) 0.5 MG tablet Take 1 tablet by mouth Daily.    glucose blood test strip Test blood glucose levels four  times daily; Contour Next Test Strips    Jardiance 25 MG tablet tablet TAKE ONE TABLET BY MOUTH DAILY    levothyroxine (SYNTHROID, LEVOTHROID) 50 MCG tablet TAKE 1 TABLET BY MOUTH EVERY MORNING    magnesium oxide (MAG-OX) 400 MG tablet Take 1 tablet by mouth Daily.    metFORMIN (GLUCOPHAGE) 1000 MG tablet TAKE 1 TABLET BY MOUTH TWICE DAILY    montelukast (SINGULAIR) 10 MG tablet Take 1 tablet by mouth Every Night.    multivitamin (THERAGRAN) tablet tablet Take 1 tablet by mouth.    ondansetron ODT (ZOFRAN-ODT) 4 MG disintegrating tablet Place 1 tablet on the tongue Every 8 (Eight) Hours As Needed for Nausea or Vomiting.    polyethylene glycol (MiraLax) 17 GM/SCOOP powder Take 17 g by mouth Daily.    rOPINIRole (REQUIP) 2 MG tablet TAKE 1 TABLET BY MOUTH EVERY NIGHT 1 HOUR BEFORE BEDTIME    Semaglutide (Rybelsus) 3 MG tablet Take 1 tablet by mouth Daily.    Semaglutide (Rybelsus) 3 MG tablet Take 1 tablet by mouth Daily.    venlafaxine (EFFEXOR) 75 MG tablet TAKE 1 TABLET BY MOUTH DAILY    HYDROcodone-acetaminophen (NORCO) 5-325 MG per tablet Take 1 tablet by mouth Every 6 (Six) Hours As Needed for Moderate Pain (for foot fractures). (Patient not taking: Reported on 5/29/2025)     No current facility-administered medications on file prior to visit.         ROS:  ROS negative except as listed in the HPI.    Physical Exam:   84 y.o. female  Body mass index is 24.69 kg/m²., 63.2 kg (139 lb 6.4 oz)  Vitals:    05/29/25 1116   Temp: 98 °F (36.7 °C)     General: Alert, cooperative, appears well and in no observable distress. Appears stated age and BMI as listed above.  HEENT: Normocephalic, atraumatic on external visual inspection.  CV: No significant peripheral edema.  Respiratory: Normal respiratory effort.  Skin: Warm & well perfused; appropriate skin turgor.  Psych: Appropriate mood & affect.  Neuro: Gross sensation and motor intact in affected extremity/extremities.  Vascular: Peripheral pulses palpable in affected  extremity/extremities.   Physical Exam      MSK Exam:  Bilateral Knee  No deformity or wounds appreciated. No significant redness or warmth.  Trace effusion noted  Tenderness along the joint line appreciated medial compartment, patellofemoral compartment  ROM 2-110 with pain at terminal motion. +crepitus  Ligamentous exam grossly stable  Quad strength 4-4+/5    Brief hip exam in the affected extremity(ies) grossly unremarkable.  Moves ankle and toes up and down, no significant pain or swelling in the foot, ankle or calf.        Radiology:    The following X-rays were ordered/reviewed today to evaluate the patient's symptoms: Bilateral Knee: AP standing, lateral, and sunrise of both knees show tricompartmental OA with more significant medial narrowing and patellofemoral changes. Compared with prior films of R knee from August 2024 there may be some subtle interval progression, no other obvious changes.  Results      Procedure:   See Procedure Note: The potential risks and benefits of performing a diagnostic and therapeutic injection were discussed with the patient prior to procedure. Risks include, but are not limited to infection, swelling, transient increase in pain, bleeding, bruising. Patient was advised that injections are a diagnostic and therapeutic tool meaning they may not alleviate symptoms at all, or may only provide partial or temporary relief. Injection precautions and aftercare discussed. and Patient is diabetic and therefore may be at increased risk of hyperglycemia following an injection of corticosteroid. These increases in BG are usually transient and resolve within a few days. Patient was advised on the potential for hyperglycemia and encouraged to self monitor for s/sx hyperglycemia and to self monitor BG. Patient encouraged to call the office for any significant hyperglycemia and/or hyperglycemia that does not resolve within 3-5 days. Injection precautions and aftercare discussed.      [facility]  Data/Labs: N/A    Assessment & Plan:      ICD-10-CM ICD-9-CM   1. Primary osteoarthritis of both knees  M17.0 715.16   2. Pain in both knees, unspecified chronicity  M25.561 719.46    M25.562      No orders of the defined types were placed in this encounter.    Orders Placed This Encounter   Procedures    Large Joint Arthrocentesis: R knee    Large Joint Arthrocentesis: L knee    XR Knee 3 View Bilateral     Patient has a history of knee arthritis on the R, I suspect her left knee pain is also degenerative. She would like injections in both today but with her blood sugars being elevated I would like to do a trial of zilretta to help mitigate post injection hyperglycemia. I am also hopeful this will give her more lasting relief as she only got about 2 months with the last one. We talked about PT, she is not interested in that right now. I would like her to check her sugars over the next few days especially after the injections. Happy to see her back in 3 months or as needed.  Assessment & Plan      Continue with activity modifications as needed/discussed.  Continue ICE and/or HEAT PRN.  Recommend to continue activity as tolerated, focus on quad and hip/core strengthening as well as gentle stretching.  Recommend lowering or maintaining BMI within a healthy range to reduce symptoms.   Patient encouraged to call with questions or concerns prior to follow up.  Will discuss with attending as needed.   Consider additional referrals, work up and/or advanced imaging as indicated or if patient fails to respond to conservative care.    Return in about 3 months (around 8/29/2025) for Injection with KAT Angel.            Large Joint Arthrocentesis: R knee  Date/Time: 5/29/2025 11:58 AM  Consent given by: patient  Site marked: site marked  Timeout: Immediately prior to procedure a time out was called to verify the correct patient, procedure, equipment, support staff and site/side marked as required   Supporting  Documentation  Indications: pain   Procedure Details  Location: knee - R knee  Preparation: Patient was prepped and draped in the usual sterile fashion  Needle gauge: 21G.  Approach: anterolateral  Medications administered: 2 mL lidocaine PF 1% 1 %; 32 mg Triamcinolone Acetonide 32 MG  Patient tolerance: patient tolerated the procedure well with no immediate complications      Large Joint Arthrocentesis: L knee  Date/Time: 5/29/2025 12:00 PM  Consent given by: patient  Site marked: site marked  Timeout: Immediately prior to procedure a time out was called to verify the correct patient, procedure, equipment, support staff and site/side marked as required   Supporting Documentation  Indications: pain   Procedure Details  Location: knee - L knee  Preparation: Patient was prepped and draped in the usual sterile fashion  Needle gauge: 21G.  Approach: anterolateral  Medications administered: 2 mL lidocaine PF 1% 1 %; 32 mg Triamcinolone Acetonide 32 MG  Patient tolerance: patient tolerated the procedure well with no immediate complications         KAT Kaur    Dictation software was used to complete a portion or all of this note.

## 2025-05-30 RX ORDER — CETIRIZINE HYDROCHLORIDE 10 MG/1
10 TABLET ORAL DAILY
Qty: 90 TABLET | Refills: 1 | Status: SHIPPED | OUTPATIENT
Start: 2025-05-30

## 2025-06-06 ENCOUNTER — OFFICE VISIT (OUTPATIENT)
Dept: INTERNAL MEDICINE | Facility: CLINIC | Age: 85
End: 2025-06-06
Payer: MEDICARE

## 2025-06-06 VITALS
WEIGHT: 137.4 LBS | BODY MASS INDEX: 24.34 KG/M2 | SYSTOLIC BLOOD PRESSURE: 124 MMHG | TEMPERATURE: 97.5 F | DIASTOLIC BLOOD PRESSURE: 68 MMHG | OXYGEN SATURATION: 96 % | HEIGHT: 63 IN | HEART RATE: 85 BPM

## 2025-06-06 DIAGNOSIS — E03.9 HYPOTHYROIDISM, UNSPECIFIED TYPE: ICD-10-CM

## 2025-06-06 DIAGNOSIS — J30.9 ALLERGIC RHINITIS, UNSPECIFIED SEASONALITY, UNSPECIFIED TRIGGER: ICD-10-CM

## 2025-06-06 DIAGNOSIS — N18.31 STAGE 3A CHRONIC KIDNEY DISEASE: ICD-10-CM

## 2025-06-06 DIAGNOSIS — R23.2 HOT FLASHES: ICD-10-CM

## 2025-06-06 DIAGNOSIS — G25.81 RESTLESS LEG: ICD-10-CM

## 2025-06-06 DIAGNOSIS — R25.2 LEG CRAMPING: ICD-10-CM

## 2025-06-06 DIAGNOSIS — E55.9 VITAMIN D DEFICIENCY: ICD-10-CM

## 2025-06-06 DIAGNOSIS — E11.65 TYPE 2 DIABETES MELLITUS WITH HYPERGLYCEMIA, WITHOUT LONG-TERM CURRENT USE OF INSULIN: Primary | ICD-10-CM

## 2025-06-06 DIAGNOSIS — E78.5 HYPERLIPIDEMIA, UNSPECIFIED HYPERLIPIDEMIA TYPE: ICD-10-CM

## 2025-06-06 DIAGNOSIS — K21.9 GASTROESOPHAGEAL REFLUX DISEASE, UNSPECIFIED WHETHER ESOPHAGITIS PRESENT: ICD-10-CM

## 2025-06-06 LAB
25(OH)D3 SERPL-MCNC: 45.3 NG/ML (ref 30–100)
ALBUMIN SERPL-MCNC: 3.9 G/DL (ref 3.5–5.2)
ALBUMIN/GLOB SERPL: 1.3 G/DL
ALP SERPL-CCNC: 72 U/L (ref 39–117)
ALT SERPL W P-5'-P-CCNC: 13 U/L (ref 1–33)
ANION GAP SERPL CALCULATED.3IONS-SCNC: 10.6 MMOL/L (ref 5–15)
AST SERPL-CCNC: 15 U/L (ref 1–32)
BASOPHILS # BLD AUTO: 0.05 10*3/MM3 (ref 0–0.2)
BASOPHILS NFR BLD AUTO: 0.7 % (ref 0–1.5)
BILIRUB SERPL-MCNC: 0.5 MG/DL (ref 0–1.2)
BUN SERPL-MCNC: 27 MG/DL (ref 8–23)
BUN/CREAT SERPL: 18 (ref 7–25)
CALCIUM SPEC-SCNC: 9.6 MG/DL (ref 8.6–10.5)
CHLORIDE SERPL-SCNC: 105 MMOL/L (ref 98–107)
CHOLEST SERPL-MCNC: 137 MG/DL (ref 0–200)
CO2 SERPL-SCNC: 23.4 MMOL/L (ref 22–29)
CREAT SERPL-MCNC: 1.5 MG/DL (ref 0.57–1)
DEPRECATED RDW RBC AUTO: 39.9 FL (ref 37–54)
EGFRCR SERPLBLD CKD-EPI 2021: 34.6 ML/MIN/1.73
EOSINOPHIL # BLD AUTO: 0.23 10*3/MM3 (ref 0–0.4)
EOSINOPHIL NFR BLD AUTO: 3.2 % (ref 0.3–6.2)
ERYTHROCYTE [DISTWIDTH] IN BLOOD BY AUTOMATED COUNT: 12.7 % (ref 12.3–15.4)
GLOBULIN UR ELPH-MCNC: 3.1 GM/DL
GLUCOSE SERPL-MCNC: 187 MG/DL (ref 65–99)
HBA1C MFR BLD: 9.3 % (ref 4.8–5.6)
HCT VFR BLD AUTO: 40.6 % (ref 34–46.6)
HDLC SERPL-MCNC: 68 MG/DL (ref 40–60)
HGB BLD-MCNC: 12.9 G/DL (ref 12–15.9)
IMM GRANULOCYTES # BLD AUTO: 0.03 10*3/MM3 (ref 0–0.05)
IMM GRANULOCYTES NFR BLD AUTO: 0.4 % (ref 0–0.5)
LDLC SERPL CALC-MCNC: 48 MG/DL (ref 0–100)
LDLC/HDLC SERPL: 0.66 {RATIO}
LYMPHOCYTES # BLD AUTO: 1.33 10*3/MM3 (ref 0.7–3.1)
LYMPHOCYTES NFR BLD AUTO: 18.2 % (ref 19.6–45.3)
MAGNESIUM SERPL-MCNC: 1.4 MG/DL (ref 1.6–2.4)
MCH RBC QN AUTO: 28 PG (ref 26.6–33)
MCHC RBC AUTO-ENTMCNC: 31.8 G/DL (ref 31.5–35.7)
MCV RBC AUTO: 88.1 FL (ref 79–97)
MONOCYTES # BLD AUTO: 0.67 10*3/MM3 (ref 0.1–0.9)
MONOCYTES NFR BLD AUTO: 9.2 % (ref 5–12)
NEUTROPHILS NFR BLD AUTO: 4.99 10*3/MM3 (ref 1.7–7)
NEUTROPHILS NFR BLD AUTO: 68.3 % (ref 42.7–76)
NRBC BLD AUTO-RTO: 0 /100 WBC (ref 0–0.2)
PLATELET # BLD AUTO: 262 10*3/MM3 (ref 140–450)
PMV BLD AUTO: 10.4 FL (ref 6–12)
POTASSIUM SERPL-SCNC: 4.9 MMOL/L (ref 3.5–5.2)
PROT SERPL-MCNC: 7 G/DL (ref 6–8.5)
RBC # BLD AUTO: 4.61 10*6/MM3 (ref 3.77–5.28)
SODIUM SERPL-SCNC: 139 MMOL/L (ref 136–145)
T4 FREE SERPL-MCNC: 1.31 NG/DL (ref 0.92–1.68)
TRIGL SERPL-MCNC: 119 MG/DL (ref 0–150)
TSH SERPL DL<=0.05 MIU/L-ACNC: 2.19 UIU/ML (ref 0.27–4.2)
VLDLC SERPL-MCNC: 21 MG/DL (ref 5–40)
WBC NRBC COR # BLD AUTO: 7.3 10*3/MM3 (ref 3.4–10.8)

## 2025-06-06 PROCEDURE — 83735 ASSAY OF MAGNESIUM: CPT | Performed by: NURSE PRACTITIONER

## 2025-06-06 PROCEDURE — 84443 ASSAY THYROID STIM HORMONE: CPT | Performed by: NURSE PRACTITIONER

## 2025-06-06 PROCEDURE — 83036 HEMOGLOBIN GLYCOSYLATED A1C: CPT | Performed by: NURSE PRACTITIONER

## 2025-06-06 PROCEDURE — 82306 VITAMIN D 25 HYDROXY: CPT | Performed by: NURSE PRACTITIONER

## 2025-06-06 PROCEDURE — 80053 COMPREHEN METABOLIC PANEL: CPT | Performed by: NURSE PRACTITIONER

## 2025-06-06 PROCEDURE — 85025 COMPLETE CBC W/AUTO DIFF WBC: CPT | Performed by: NURSE PRACTITIONER

## 2025-06-06 PROCEDURE — 84439 ASSAY OF FREE THYROXINE: CPT | Performed by: NURSE PRACTITIONER

## 2025-06-06 PROCEDURE — 80061 LIPID PANEL: CPT | Performed by: NURSE PRACTITIONER

## 2025-06-06 NOTE — ASSESSMENT & PLAN NOTE
Poorly controlled, most recent A1c 8.3. Continue metformin, Jardiance and Rybelsus. Patient will reach out to clinic if she is unable to get this prescription filled. Samples unavailable in clinic today. She should monitor blood glucose levels closely and call or return to clinic with consistent elevations or frequent lows. Repeat labs in clinic today. Diabetic eye exam: 6/2025.  Diabetic foot exam: Today. Urine microalbumin: 4/2025. Renal protection: Nephrology discontinued ACE. Pneumonia vaccination: Up to date.     Orders:    CBC & Differential    Comprehensive Metabolic Panel    Hemoglobin A1c    Lipid Panel

## 2025-06-06 NOTE — ASSESSMENT & PLAN NOTE
Continue vitamin D supplement, vitamin D level with labs today.   Orders:    Vitamin D,25-Hydroxy

## 2025-06-06 NOTE — ASSESSMENT & PLAN NOTE
Continue Requip. Will check labs today to evaluate leg cramping and will consider increasing dosage to 3 mg based on results.

## 2025-06-06 NOTE — ASSESSMENT & PLAN NOTE
Well controlled on previous labs, continue Synthroid. Thyroid profile in clinic today. Will adjust medication based on results of labs.  Orders:    TSH    T4, Free

## 2025-06-06 NOTE — PROGRESS NOTES
"Chief Complaint  Follow-up (6 Month Follow Up/)    Subjective      Griselda Henderson is a 84 y.o. female who presents to [facility]     DM2-  Patient continues Jardiance and metformin. Has been having trouble getting the Rybelsus, has been two weeks since the paperwork was filled out again and she has not received her prescription.  She has been on the 3 mg dosage consistently.  Reports home blood glucose readings have been high since injections in her knees, fasting 202 this morning. Most recent A1c increased to 8.3. Diabetic eye exam: 6/2025.  Diabetic foot exam: 6/2023, due. Urine microalbumin: 4/2025. Renal protection: Nephrology discontinued ACE. Pneumonia vaccination: Up to date.      GERD-  Well controlled with Nexium.      HLD-  Continues statin, most recent LDL 45.     Hot flashes-  Stable with Effexor and estradiol.      CKD-  Most recent GFR 44.  Released from nephrology for annual follow up.      Hypothyroid-  Managed with Synthroid.      Vitamin D deficiency-  Continues oral supplement.     Restless leg-  Patient states the Requip will work sometimes, this past week she feels like she has had muscle cramping. Symptoms worse since she returned from vacation. States she stays hydrated.      Allergic rhinitis-  Continues Zyrtec and Singulair, does not want to adjust medication.      Shingles vaccination: Up to date  COVID19 vaccination: Up to date   RSV vaccination: Up to date   Pneumonia vaccination: Up to date  Mammogram: 9/2024; declines repeat  DEXA: 6/2022; declines repeat     Objective   Vital Signs:   Vitals:    06/06/25 0942   BP: 124/68   Pulse: 85   Temp: 97.5 °F (36.4 °C)   SpO2: 96%   Weight: 62.3 kg (137 lb 6.4 oz)   Height: 160 cm (62.99\")     Body mass index is 24.35 kg/m².    Wt Readings from Last 3 Encounters:   06/06/25 62.3 kg (137 lb 6.4 oz)   05/29/25 63.2 kg (139 lb 6.4 oz)   02/27/25 65.3 kg (144 lb)     BP Readings from Last 3 Encounters: "   06/06/25 124/68   02/27/25 143/98   12/16/24 118/66       Health Maintenance   Topic Date Due    ANNUAL WELLNESS VISIT  06/18/2025    HEMOGLOBIN A1C  08/26/2025    DXA SCAN  06/06/2025 (Originally 6/14/2024)    COVID-19 Vaccine (10 - 2024-25 season) 12/06/2025 (Originally 3/13/2025)    INFLUENZA VACCINE  07/01/2025    LIPID PANEL  02/26/2026    URINE MICROALBUMIN-CREATININE RATIO (uACR)  04/30/2026    DIABETIC EYE EXAM  06/03/2026    DIABETIC FOOT EXAM  06/06/2026    TDAP/TD VACCINES (2 - Td or Tdap) 09/13/2034    RSV Vaccine - Adults  Completed    Pneumococcal Vaccine 50+  Completed    ZOSTER VACCINE  Completed       Physical Exam  Constitutional:       Appearance: Normal appearance.   HENT:      Head: Normocephalic and atraumatic.      Nose: Nose normal.      Mouth/Throat:      Mouth: Mucous membranes are moist.      Pharynx: Oropharynx is clear.   Eyes:      Extraocular Movements: Extraocular movements intact.      Conjunctiva/sclera: Conjunctivae normal.      Pupils: Pupils are equal, round, and reactive to light.   Neck:      Thyroid: No thyroid mass, thyromegaly or thyroid tenderness.   Cardiovascular:      Rate and Rhythm: Normal rate and regular rhythm.      Heart sounds: Normal heart sounds.   Pulmonary:      Effort: Pulmonary effort is normal.      Breath sounds: Normal breath sounds.   Musculoskeletal:        Feet:    Skin:     General: Skin is warm and dry.   Neurological:      General: No focal deficit present.      Mental Status: She is alert and oriented to person, place, and time.   Psychiatric:         Mood and Affect: Mood normal.         Behavior: Behavior normal.         Thought Content: Thought content normal.          Result Review :  The following data was reviewed by: KAT Willingham on 06/06/2025:  Common labs          10/30/2024    13:20 12/16/2024    12:18 2/26/2025    07:20   Common Labs   Glucose  234  179    BUN  33  23    Creatinine  1.28  1.19    Sodium  136  143    Potassium   4.6  4.5    Chloride  99  108    Calcium  9.7  9.4    Albumin  4.0  3.7    Total Bilirubin  0.3  0.4    Alkaline Phosphatase  60  58    AST (SGOT)  19  19    ALT (SGPT)  14  17    WBC  6.44  6.12    Hemoglobin  14.3  13.6    Hematocrit  43.5  43.6    Platelets  337  272    Total Cholesterol  143  137    Triglycerides  118  114    HDL Cholesterol  66  72    LDL Cholesterol   56  45    Hemoglobin A1C 8.6     8.20  8.30       Details          This result is from an external source.             A1C Last 3 Results          10/30/2024    13:20 12/16/2024    12:18 2/26/2025    07:20   HGBA1C Last 3 Results   Hemoglobin A1C 8.6     8.20  8.30       Details          This result is from an external source.                  Procedures          Assessment & Plan  Type 2 diabetes mellitus with hyperglycemia, without long-term current use of insulin  Poorly controlled, most recent A1c 8.3. Continue metformin, Jardiance and Rybelsus. Patient will reach out to clinic if she is unable to get this prescription filled. Samples unavailable in clinic today. She should monitor blood glucose levels closely and call or return to clinic with consistent elevations or frequent lows. Repeat labs in clinic today. Diabetic eye exam: 6/2025.  Diabetic foot exam: Today. Urine microalbumin: 4/2025. Renal protection: Nephrology discontinued ACE. Pneumonia vaccination: Up to date.     Orders:    CBC & Differential    Comprehensive Metabolic Panel    Hemoglobin A1c    Lipid Panel    Hyperlipidemia, unspecified hyperlipidemia type  Continue statin, lipid panel today.          Gastroesophageal reflux disease, unspecified whether esophagitis present  Well controlled, continue PPI.        Stage 3a chronic kidney disease  Continue to follow with nephrology, CMP today.         Vitamin D deficiency  Continue vitamin D supplement, vitamin D level with labs today.   Orders:    Vitamin D,25-Hydroxy    Hypothyroidism, unspecified type  Well controlled on  previous labs, continue Synthroid. Thyroid profile in clinic today. Will adjust medication based on results of labs.  Orders:    TSH    T4, Free    Restless leg  Continue Requip. Will check labs today to evaluate leg cramping and will consider increasing dosage to 3 mg based on results.       Leg cramping  Labs as discussed.  Orders:    Magnesium    Allergic rhinitis, unspecified seasonality, unspecified trigger  Continue current medication regimen.        Hot flashes  Patient aware of risks associated with long term HRT. Continue current medications.            BMI is within normal parameters. No other follow-up for BMI required.         FOLLOW UP  Return in about 3 months (around 9/6/2025) for Please schedule annual wellness visit.  Patient was given instructions and counseling regarding her condition or for health maintenance advice. Please see specific information pulled into the AVS if appropriate.       KAT Willingham  06/06/25  10:56 EDT    CURRENT & DISCONTINUED MEDICATIONS  Current Outpatient Medications   Medication Instructions    atorvastatin (LIPITOR) 10 mg, Oral, Nightly    cetirizine (ZYRTEC) 10 mg, Oral, Daily    Cholecalciferol (Vitamin D) 50 MCG (2000 UT) tablet 1 tablet, Daily    Diclofenac Sodium (VOLTAREN) 1 % gel gel Topical, 4 Times Daily    esomeprazole (NEXIUM) 40 mg, Oral, Daily    estradiol (ESTRACE) 0.5 mg, Oral, Daily    glucose blood test strip Test blood glucose levels four times daily; Contour Next Test Strips    HYDROcodone-acetaminophen (NORCO) 5-325 MG per tablet 1 tablet, Oral, Every 6 Hours PRN    Jardiance 25 mg, Oral, Daily    levothyroxine (SYNTHROID, LEVOTHROID) 50 mcg, Oral, Every Early Morning    magnesium oxide (MAG-OX) 400 mg, Daily    metFORMIN (GLUCOPHAGE) 1000 MG tablet TAKE 1 TABLET BY MOUTH TWICE DAILY    montelukast (SINGULAIR) 10 mg, Oral, Nightly    multivitamin (THERAGRAN) tablet tablet 1 tablet    ondansetron ODT (ZOFRAN-ODT) 4 mg, Translingual, Every 8  Hours PRN    rOPINIRole (REQUIP) 2 mg, Oral, Nightly, Take 1 hour before bedtime    Rybelsus 3 mg, Oral, Daily    venlafaxine (EFFEXOR) 75 mg, Oral, Daily       Medications Discontinued During This Encounter   Medication Reason    Aspirin Buf,CaCarb-MgCarb-MgO, 81 MG tablet     polyethylene glycol (MiraLax) 17 GM/SCOOP powder     Semaglutide (Rybelsus) 3 MG tablet

## 2025-06-16 RX ORDER — ATORVASTATIN CALCIUM 10 MG/1
10 TABLET, FILM COATED ORAL NIGHTLY
Qty: 90 TABLET | Refills: 1 | Status: SHIPPED | OUTPATIENT
Start: 2025-06-16

## 2025-06-19 DIAGNOSIS — E11.65 TYPE 2 DIABETES MELLITUS WITH HYPERGLYCEMIA, WITHOUT LONG-TERM CURRENT USE OF INSULIN: ICD-10-CM

## 2025-06-19 RX ORDER — ORAL SEMAGLUTIDE 7 MG/1
7 TABLET ORAL DAILY
Qty: 30 TABLET | Refills: 0 | Status: SHIPPED | OUTPATIENT
Start: 2025-06-19

## 2025-07-10 RX ORDER — ROPINIROLE 3 MG/1
3 TABLET, FILM COATED ORAL NIGHTLY
Qty: 90 TABLET | Refills: 1 | Status: SHIPPED | OUTPATIENT
Start: 2025-07-10

## 2025-07-22 ENCOUNTER — TELEPHONE (OUTPATIENT)
Dept: INTERNAL MEDICINE | Facility: CLINIC | Age: 85
End: 2025-07-22
Payer: MEDICARE

## 2025-07-24 ENCOUNTER — TELEPHONE (OUTPATIENT)
Dept: INTERNAL MEDICINE | Facility: CLINIC | Age: 85
End: 2025-07-24
Payer: MEDICARE

## 2025-07-24 NOTE — TELEPHONE ENCOUNTER
Called and spoke to patient to tell her the Rybelsus came in today. I put at  and she will  today.